# Patient Record
Sex: FEMALE | Race: WHITE | NOT HISPANIC OR LATINO | Employment: PART TIME | URBAN - NONMETROPOLITAN AREA
[De-identification: names, ages, dates, MRNs, and addresses within clinical notes are randomized per-mention and may not be internally consistent; named-entity substitution may affect disease eponyms.]

---

## 2017-06-13 ENCOUNTER — HOSPITAL ENCOUNTER (OUTPATIENT)
Dept: MEDSURG UNIT | Facility: OTHER | Age: 27
Discharge: HOME | End: 2017-06-14
Attending: EMERGENCY MEDICINE | Admitting: SURGERY

## 2017-06-13 ENCOUNTER — HISTORY (OUTPATIENT)
Dept: EMERGENCY MEDICINE | Facility: OTHER | Age: 27
End: 2017-06-13

## 2017-06-13 DIAGNOSIS — K35.80 ACUTE APPENDICITIS: ICD-10-CM

## 2017-06-13 DIAGNOSIS — K35.30 ACUTE APPENDICITIS WITH LOCALIZED PERITONITIS: ICD-10-CM

## 2017-06-13 LAB
A/G RATIO - HISTORICAL: 1.6 (ref 1–2)
ABSOLUTE BASOPHILS - HISTORICAL: 0.1 THOU/CU MM
ABSOLUTE EOSINOPHILS - HISTORICAL: 0 THOU/CU MM
ABSOLUTE LYMPHOCYTES - HISTORICAL: 2.9 THOU/CU MM (ref 0.9–2.9)
ABSOLUTE MONOCYTES - HISTORICAL: 1.2 THOU/CU MM
ABSOLUTE NEUTROPHILS - HISTORICAL: 14 THOU/CU MM (ref 1.7–7)
ALBUMIN SERPL-MCNC: 4.5 G/DL (ref 3.5–5.7)
ALP SERPL-CCNC: 69 IU/L (ref 34–104)
ALT (SGPT) - HISTORICAL: 14 IU/L (ref 7–52)
ANION GAP - HISTORICAL: 8 (ref 5–18)
AST SERPL-CCNC: 15 IU/L (ref 13–39)
BACTERIA URINE: NORMAL BACTERIA/HPF
BASOPHILS # BLD AUTO: 0.3 %
BILIRUB SERPL-MCNC: 0.6 MG/DL (ref 0.3–1)
BILIRUB UR QL: NEGATIVE
BUN SERPL-MCNC: 11 MG/DL (ref 7–25)
BUN/CREAT RATIO - HISTORICAL: 13
C-REACTIVE PROTEIN - HISTORICAL: <1 MG/DL
CALCIUM SERPL-MCNC: 9.7 MG/DL (ref 8.6–10.3)
CHLORIDE SERPLBLD-SCNC: 107 MMOL/L (ref 98–107)
CLARITY, URINE: CLEAR CLARITY
CO2 SERPL-SCNC: 22 MMOL/L (ref 21–31)
COLOR UR: YELLOW COLOR
CREAT SERPL-MCNC: 0.88 MG/DL (ref 0.7–1.3)
EOSINOPHIL NFR BLD AUTO: 0.2 %
EPITHELIAL CELLS: NORMAL EPI/HPF
ERYTHROCYTE [DISTWIDTH] IN BLOOD BY AUTOMATED COUNT: 10.8 % (ref 11.5–15.5)
GFR IF NOT AFRICAN AMERICAN - HISTORICAL: >60 ML/MIN/1.73M2
GLOBULIN - HISTORICAL: 2.9 G/DL (ref 2–3.7)
GLUCOSE SERPL-MCNC: 118 MG/DL (ref 70–105)
GLUCOSE URINE: NEGATIVE MG/DL
HCG UR QL: NEGATIVE
HCT VFR BLD AUTO: 44 % (ref 33–51)
HEMOGLOBIN: 15.9 G/DL (ref 12–16)
KETONES UR QL: NEGATIVE MG/DL
LACTATE SERPL-MCNC: 3.3 MMOL/L (ref 0.5–2.2)
LEUKOCYTE ESTERASE URINE: NEGATIVE
LIPASE SERPL-CCNC: 13 IU/L (ref 11–82)
LYMPHOCYTES NFR BLD AUTO: 16 % (ref 20–44)
MCH RBC QN AUTO: 33.1 PG (ref 26–34)
MCHC RBC AUTO-ENTMCNC: 36.1 G/DL (ref 32–36)
MCV RBC AUTO: 92 FL (ref 80–100)
MONOCYTES NFR BLD AUTO: 6.7 %
NEUTROPHILS NFR BLD AUTO: 76.4 % (ref 42–72)
NITRITE UR QL STRIP: NEGATIVE
OCCULT BLOOD,URINE - HISTORICAL: ABNORMAL
OTHER: NORMAL
PH UR: 5.5 [PH]
PLATELET # BLD AUTO: 227 THOU/CU MM (ref 140–440)
PMV BLD: 9.6 FL (ref 6.5–11)
POTASSIUM SERPL-SCNC: 3.9 MMOL/L (ref 3.5–5.1)
PROT SERPL-MCNC: 7.4 G/DL (ref 6.4–8.9)
PROTEIN QUALITATIVE,URINE - HISTORICAL: NEGATIVE MG/DL
RBC - HISTORICAL: NORMAL /HPF
RED BLOOD COUNT - HISTORICAL: 4.81 MIL/CU MM (ref 4–5.2)
SODIUM SERPL-SCNC: 137 MMOL/L (ref 133–143)
SP GR UR STRIP: 1.02
UROBILINOGEN,QUALITATIVE - HISTORICAL: NORMAL EU/DL
WBC - HISTORICAL: NORMAL /HPF
WHITE BLOOD COUNT - HISTORICAL: 18.4 THOU/CU MM (ref 4.5–11)

## 2017-06-14 ENCOUNTER — HISTORY (OUTPATIENT)
Dept: MEDSURG UNIT | Facility: OTHER | Age: 27
End: 2017-06-14

## 2017-06-14 ENCOUNTER — AMBULATORY - GICH (OUTPATIENT)
Dept: SCHEDULING | Facility: OTHER | Age: 27
End: 2017-06-14

## 2017-06-14 ENCOUNTER — SURGERY (OUTPATIENT)
Dept: SURGERY | Facility: OTHER | Age: 27
End: 2017-06-14

## 2017-06-14 LAB
LACTATE SERPL-MCNC: 1.4 MMOL/L (ref 0.5–2.2)
MAGNESIUM SERPL-MCNC: 1.9 MG/DL (ref 1.9–2.7)
MAGNESIUM SERPL-MCNC: 1.9 MG/DL (ref 1.9–2.7)
POTASSIUM SERPL-SCNC: 3.8 MMOL/L (ref 3.5–5.1)
WEAK D - HISTORICAL: NEGATIVE

## 2017-06-15 LAB
ABORH - HISTORICAL: NORMAL
ANTIBODY SCREEN - HISTORICAL: NEGATIVE
SPECIMEN EXPIRATION DATE/TIME - HISTORICAL: NORMAL

## 2017-06-19 LAB
CULTURE - HISTORICAL: NORMAL
CULTURE - HISTORICAL: NORMAL

## 2017-06-21 ENCOUNTER — HISTORY (OUTPATIENT)
Dept: SURGERY | Facility: OTHER | Age: 27
End: 2017-06-21

## 2017-06-21 ENCOUNTER — OFFICE VISIT - GICH (OUTPATIENT)
Dept: SURGERY | Facility: OTHER | Age: 27
End: 2017-06-21

## 2017-06-21 DIAGNOSIS — Z98.890 OTHER SPECIFIED POSTPROCEDURAL STATES: ICD-10-CM

## 2017-12-27 NOTE — PROGRESS NOTES
Patient Information     Patient Name MRN Sex Ysabel Bailey [id number] Female 1990      Progress Notes by Ema Escoto at 2017 10:00 AM     Author:  Ema Escoto Service:  (none) Author Type:  NURS- Registered Nurse     Filed:  2017 10:28 AM Date of Service:  2017 10:00 AM Status:  Signed     :  Ema Escoto (NURS- Registered Nurse)            Patient transferred back from PACU to med/surg room 312 via bed. Patient is stable. Does have a little pain but states it is tolerable. Mom is at bedside. Will continue to monitor.

## 2017-12-27 NOTE — PROGRESS NOTES
"Patient Information     Patient Name MRYsabel Jimenez [id number] Female 1990      Progress Notes by Igor Billingsley PharmD at 2017  2:38 PM     Author:  Igor Billingsley PharmD Service:  (none) Author Type:  PHARM- Pharmacist     Filed:  2017  2:39 PM Date of Service:  2017  2:38 PM Status:  Signed     :  Igor Billingsley PharmD (PHARM- Pharmacist)            Pharmacy: Discharge Counseling and Medication Reconciliation    Ysabel David  [address] [facility] [address]    Home Phone [phone]   Work Phone Not on file.   Mobile [phone]     26 y.o. female  PCP:Zay Carlos MD    No Known Allergies    Discharge Counseling:    Pharmacist met with patient (and/or family) today to review the medication portion of the After Visit Summary (with an emphasis on NEW medications) and to address patient's questions/concerns.     Summary of Education: PharmD met with patient to discuss new medications after discharge. Reviewed indication, expected duration, potential side effects and proper use of each.     Materials Provided:   MedCounselor sheets printed from Clinical Pharmacology on: \"Oxycodone/ APAP\" and \"Docusate\"    Discharge Medication Reconciliation:    Igor Billingsley PharmD has reviewed the patient's discharge medication orders and has compared them to the inpatient medication administration record and to what the patient was taking prior to admission- any discrepancies have been resolved.     It has been determined that the patient has an adequate supply of medications available or which can be obtained from the patient's preferred pharmacy, which HE/SHE has confirmed as: [facility].    Thank you for the consult.     Igor Billingsley PharmD ....................  2017   2:38 PM          "

## 2017-12-27 NOTE — PROGRESS NOTES
Patient Information     Patient Name MRN Ysabel Rose [id number] Female 1990      Progress Notes by Roya Souza RN at 2017  7:24 AM     Author:  Roya Souza RN Service:  (none) Author Type:  NURS- Registered Nurse     Filed:  2017  7:25 AM Date of Service:  2017  7:24 AM Status:  Signed     :  Roya Souza RN (NURS- Registered Nurse)            Shower and bryant wipes have been done. Zofran given for nausea, Dilaudid given for pain. Roya Souza RN   ........2017  7:25 AM

## 2017-12-27 NOTE — PROGRESS NOTES
Patient Information     Patient Name MRN Sex Ysabel Bailey [id number] Female 1990      Progress Notes by Ladonna Cormier, RN at 2017 12:34 AM     Author:  Ladonna Cormier RN Service:  (none) Author Type:  NURS- Registered Nurse     Filed:  2017 12:34 AM Date of Service:  2017 12:34 AM Status:  Signed     :  Ladonna Cormier RN (NURS- Registered Nurse)            Patient assigned to  312 when nurse Dickerson is available to take over care.

## 2017-12-27 NOTE — PROGRESS NOTES
Patient Information     Patient Name MRN Sex Ysabel Bailey [id number] Female 1990      Progress Notes by Erendira Martin RN at 2017  8:45 PM     Author:  Erendira Matrin RN Service:  (none) Author Type:  NURS- Registered Nurse     Filed:  2017  9:21 PM Date of Service:  2017  8:45 PM Status:  Signed     :  Erendira Martin RN (NURS- Registered Nurse)            Discharge Note    Data:  Ysabel David has been discharged home at  via wheelchair accompanied by Nursing Assistant and Family.      Action:  IV access removed, patient reporting decreased pain with medication. Written discharge/follow-up instructions were provided to patient. Prescriptions were taken by mom and filled prior to discharge.  Belongings sent with patient and Mother. Medications from home sent with patient/family: No  Equipment none .     Response:  Patient and Mother verbalized understanding of discharge instructions, reason for discharge, and necessary follow-up appointments.   ERENDIRA MARTIN RN ....................  2017   8:45 PM

## 2017-12-27 NOTE — PROGRESS NOTES
Patient Information     Patient Name MRN Sex Ysabel Bailey [id number] Female 1990      Progress Notes by Gloria Morejon at 2017 10:31 PM     Author:  Gloria Morejon Service:  (none) Author Type:  [employer] - Registered Radiologic Technologist     Filed:  2017 10:31 PM Date of Service:  2017 10:31 PM Status:  Signed     :  Gloria Morejon (Counts include 234 beds at the [facility] - Registered Radiologic Technologist)            1.  Has the patient had a previous reaction to IV contrast? No    2.  Does the patient have kidney disease? No    3.  Is the patient on dialysis? No    If YES to any of these questions, exam will be reviewed with a Radiologist before administering contrast.

## 2017-12-27 NOTE — PROGRESS NOTES
Patient Information     Patient Name MRN Sex Ysabel Bailey [id number] Female 1990      Progress Notes by Roya Souza RN at 2017  2:13 AM     Author:  Roya Souza RN Service:  (none) Author Type:  NURS- Registered Nurse     Filed:  2017  2:14 AM Date of Service:  2017  2:13 AM Status:  Signed     :  Roya Souza RN (NURS- Registered Nurse)            Admission Note    Data:  Ysabel David admitted to medical from emergency department via cart.  Report given from Orquidea FERNANDEZ.    Action:  Family and Dr. cMkenzie have been notified of admission.      Response:  Patient tolerated transfer. and Patient is stable. Roya Souza RN   ........2017  2:14 AM

## 2017-12-27 NOTE — PROGRESS NOTES
Patient Information     Patient Name MRN Ysabel Rose [id number] Female 1990      Progress Notes by Ema Escoto at 2017  5:13 PM     Author:  Ema Escoto Service:  (none) Author Type:  NURS- Registered Nurse     Filed:  2017  5:13 PM Date of Service:  2017  5:13 PM Status:  Signed     :  Ema Escoto (NURS- Registered Nurse)            Problem: PAIN  Goal: VERBALIZES/DISPLAYS ADEQUATE COMFORT LEVEL OR BASELINE COMFORT LEVEL  Patient preparing to discharge. Got dressed and went back to bed while waiting for discharge. Started having episode of sudden, severe pain - located in the same places as before (back, upper back and abdomen) but was shaking and hardly able to breathe or speak. Morphine and toradol administered. VSS stable leading into episode. Dr. Rodrigues notified of episode and ordered 1x dose of IV tylenol. Patient began to settle down and rating pain at 3/10. Discussed the episode of pain with patient and decided to hold this IV tylenol and give 2 percocet for better pain control. At this time, patient is currently resting in room. Plan to eat supper and go for a walk. Pain is possibly r/t gas as patient has not passed gas at this time. Will continue to monitor, still potential for discharge this evening.

## 2017-12-28 NOTE — PROGRESS NOTES
Patient Information     Patient Name MRN Sex Ysabel Bailey [id number] Female 1990      Progress Notes by Igor Billingsley PharmD at 2017 10:31 AM     Author:  Igor Billingsley PharmD Service:  (none) Author Type:  PHARM- Pharmacist     Filed:  2017 10:31 AM Date of Service:  2017 10:31 AM Status:  Signed     :  Igor Billingsley PharmD (PHARM- Pharmacist)            Pharmacy - Transfer Medication Reconciliation     The patient's transfer medication orders have been compared to the medication administration record and to the Prior to Admissions Medications list - any noted discrepancies were resolved with the MD.     Thank you. Pharmacy will continue to monitor.     Igor Billingsley PharmD ....................  2017   10:31 AM

## 2017-12-28 NOTE — PROGRESS NOTES
Patient Information     Patient Name MRN Ysabel Rose [id number] Female 1990      Progress Notes by Addy Rodrigues MD at 2017  2:40 PM     Author:  Addy Rodrigues MD Service:  (none) Author Type:  Physician     Filed:  2017  2:56 PM Encounter Date:  2017 Status:  Signed     :  Addy Rodrigues MD (Physician)            Subjective:  This is a follow up after laparoscopic appendectomy on 17 for acute appendicitis. The patient has no complaints.  She is eating well, moving her bowels regularly and has no discomfort.    Objective: /78  Pulse 90  Wt 71.5 kg (157 lb 9.6 oz)  LMP 2017  Breastfeeding? No  BMI 24.68 kg/m2  The incisions are healing well without erythema. Soft and nontender.    Assessment:   Doing well after laparoscopic appendectomy    Plan:  Follow-up as needed

## 2017-12-28 NOTE — PROGRESS NOTES
"Patient Information     Patient Name MRN Sex Ysabel Bailey [id number] Female 1990      Progress Notes by Addy Rodrigues MD at 2017  5:20 PM     Author:  Addy Rodrigues MD Service:  (none) Author Type:  Physician     Filed:  2017  5:22 PM Date of Service:  2017  5:20 PM Status:  Signed     :  Addy Rodrigues MD (Physician)            Surgical Progress Note     POD# 0 s/p laparoscopic appendectomy.    Subjective: Pain much better controlled. Had right shoulder pain earlier.    Objective: Eating and comfortable in bed    Intake/Output Summary at --  3 Day 7AM to 7AM   Last data filed at 17 1500   Gross for 17 Gross for 17 Gross for 17   Intake      0 ml   2473 ml   1574 ml   Output      0 ml    875 ml   2900 ml   Net      0 ml   1598 ml  -1326 ml         /65  Pulse 92  Temp 98.7  F (37.1  C)  Resp 16  Ht 1.702 m (5' 7\")  Wt 74.9 kg (165 lb 2 oz)  LMP 2017  SpO2 98%  Breastfeeding? No  BMI 25.86 kg/m2Temp (24hrs), Av.6  F (37  C), Min:97.7  F (36.5  C), Max:100.2  F (37.9  C)         ABDOMEN: soft and non-tender    LABS  Recent Labs       17   2137   WBC  18.4 H   RBC  4.81   HGB  15.9   HCT  44.0   MCV  92   MCH  33.1   MCHC  36.1 H   PLT  227   MPV  9.6       Recent Labs        17   0416  17   2137   SODIUM   --   137   POTASSIUM  3.8  3.9   CHLORIDE   --   107   [id number]   --   22   BUN   --   11   CREATININE   --   0.88   GLUCOSE   --   118 H   CALCIUM   --   9.7     Recent Labs       17   2137   ALKPHOSPH  69   PROTEIN  7.4   BILITOTAL  0.6   AST  15   ALT  14           ASSESSMENT  Stable after laparoscopic appendectomy. Pain likely from gas at surgery.    PLAN  Discharge today      Addy Rodrigues MD            "

## 2017-12-28 NOTE — PROGRESS NOTES
Patient Information     Patient Name MRN Sex Ysabel Bailey [id number] Female 1990      Progress Notes by Igor Billingsley PharmD at 2017 12:05 PM     Author:  Igor Billingsley PharmD Service:  (none) Author Type:  PHARM- Pharmacist     Filed:  2017 12:08 PM Date of Service:  2017 12:05 PM Status:  Signed     :  Igor Billingsley PharmD (PHARM- Pharmacist)            Pharmacy -- Admission Medication Reconciliation    Prior to admission (PTA) medications were reviewed and the patient's PTA medication list was updated.     Sources Consulted: Patient Interview, Chart Review, [facility], [employer]    The reliability of this Medication Reconciliation is: MODERATE.    The following significant changes were made:  Removed Seroqeul XR as patient is not on that formulation anymore.   Updated 30 mg Adderall to be the XR formulation. She usually takes either the XR or the IR depending on what she has to get done in the day- she doesn't take both.  Patient [facility] she is unsure of the name. It is a once daily. Left current on list to reflect OC use, note formulation may not be correct.        In addition, the patient's allergies were reviewed with the patient and updated as follows -  No Known Allergies    The pharmacist has reviewed with the patient that all personal medications should be removed from the building or locked in the belongings safe. Patient shall only take medications ordered by the physician and administered by the nursing staff.     Pharmacy Discharge Planning      Medication barriers identified:  Patient states that she has insurance and that her copays are very low.     Medication adherence concerns:  NA    Understanding of emergency medications:  TRUMAN Billingsley PharmD ....................  2017   12:05 PM

## 2017-12-28 NOTE — OR POSTOP
Patient Information     Patient Name MRN Sex Ysabel Bailey [id number] Female 1990      OR PostOp by Lara Dixon RN at 2017  9:50 AM     Author:  Lara Dixon RN Service:  (none) Author Type:  NURS- Registered Nurse     Filed:  2017  9:50 AM Date of Service:  2017  9:50 AM Status:  Signed     :  Lara Dixon RN (NURS- Registered Nurse)            PACU Transfer Note    Ysabel David transferred to [facility] via bed.  Equipment used for transport:  None.  Accompanied by:  Registered Nurse. Report given to Ema FERNANDEZ.    Patient stable and meets phase 1 discharge criteria for transport from PACU.    PACU Respiratory Event Documentation     1) Episodes of Apnea greater than or equal to 10 seconds: No    2) Bradypnea - less than 8 breaths per minute: No    3) Pain score on 0 to 10 scale: 3    4) Pain-sedation mismatch (yes or no): No    5) Repeated 02 desaturation less than 90% (yes or no): No    Anesthesia notified? (yes or no): No    Any of the above events occuring repeatedly in separate 30 minute intervals may be considered recurrent PACU respiratory events.

## 2017-12-28 NOTE — OR ANESTHESIA
Patient Information     Patient Name MRN Sex Ysabel Bailey [id number] Female 1990      OR Anesthesia by Ariel Zhao DO at 2017  7:32 AM     Author:  Ariel Zhao DO Service:  (none) Author Type:  PHYS- Anesthesiologist     Filed:  2017  7:32 AM Date of Service:  2017  7:32 AM Status:  Signed     :  Ariel Zhao DO (PHYS- Anesthesiologist)                                                           ANESTHESIA ASSESSMENT    Date: 17 Time: 7:32 AM      Patient:  Ysabel David    Procedure(s) (LRB):  LAPAROSCOPIC APPENDECTOMY (N/A)    Past Medical History:     Diagnosis  Date     Borderline personality disorder     ?      Depression, major     suicidal ideation, hospitalized x 3 in       Menarche Age 14       No past surgical history on file.    Family History       Problem   Relation Age of Onset     Psychiatric illness  Brother      Severe depression       Cancer-breast  Maternal Grandmother      Cancer-colon  No Family History      Cancer-prostate  No Family History      Cancer-ovarian  No Family History      Blood Disease  No Family History      Asthma  No Family History      Heart Disease  No Family History      Diabetes  No Family History      Hypertension  No Family History      Seizures  No Family History      Stroke  No Family History      Thyroid Disease  No Family History        Patient Active Problem List     Diagnosis  Code     INSOMNIA G47.00     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  0     dextroamphetamine-amphetamine (ADDERALL) 20 mg tablet Take 1 tablet by mouth once daily.  0     norethin-e.estradiol triphasic (NORTREL , ,) 0.5/0.75/1 mg- 35 mcg tablet Take 1 tablet by mouth once daily. 3 Package 3      "QUEtiapine (SEROQUEL) 200 mg tablet Take 1 tablet by mouth at bedtime.  0     SEROQUEL  mg Extended-Release tablet TAKE ONE TABLET BY MOUTH AT BEDTIME 20 tablet 0       Allergies:No Known Allergies    Review of Systems:  GERD: No  Chest pain: No  Shortness of breath: No  Recent fever: No  Poor exercise tolerance: No  Bleeding tendency: No  Pregnant: No  Anesthesia Complications: None      History    Smoking Status      Never Smoker   Smokeless Tobacco      Never Used     Social History     Social History        Marital status:  Single     Spouse name: N/A     Number of children:  N/A     Years of education:  N/A     Social History Main Topics          Smoking status:   Never Smoker      Smokeless tobacco:   Never Used      Alcohol use   Yes      Comment: socially       Drug use:   Yes      Special:  Marijuana      Sexual activity:   Yes      Partners:  Male      Birth control/ protection:  Pill      Other Topics  Concern     None      Social History Narrative     She was home schooled.  Involved in drama at the high school.       Physical Examination:  /85  Pulse (!) 103  Temp 98.3  F (36.8  C)  Resp 12  Ht 1.702 m (5' 7\")  Wt 74.9 kg (165 lb 2 oz)  LMP 05/30/2017 Comment: neg hcg 6/13/17  SpO2 96%  Breastfeeding? No  BMI 25.86 kg/m2 Body mass index is 25.86 kg/(m^2). Body surface area is 1.88 meters squared.  Dental Condition: Good     Mallampati Score (Airway): II  Cardiovascular: Normal  Pulmonary: Normal  Other: N/A    Recent Labs in [facility]:    Recent Labs         06/14/17   0416  06/13/17   2214  06/13/17   2137   SODIUM   --    --   137   POTASSIUM  3.8   --   3.9   CHLORIDE   --    --   107   AV4QICUH   --    --   22   ANIONGAP   --    --   8   BUN   --    --   11   CREATININE   --    --   0.88   BUNCREARATIO   --    --   13   CALCIUM   --    --   9.7   GLUCOSE   --    --   118 H   MAGNESIUM  1.9   --   1.9   WBC   --    --   18.4 H   HGB   --    --   15.9   HCT   --    --   44.0   PLT "   --    --   227   ABORH  A, See Weak D   --    --    PREGURINE   --   Negative   --              Assessment/Plan:  ASA Class: II  Risk of dental injury discussed: Yes  NPO status confirmed: Yes  Anesthetic Plan:  General  Risk/Benefit/Alt discussed: Yes  Questions answered: Yes  Emergency Case?: Yes  Labs/ECG/Radiology Reviewed?: Yes      H&P Reviewed.  Patient Examined.      Provider Electronic Signature:  Ariel Zhao, DO

## 2017-12-28 NOTE — CARE COORDINATION
"Patient Information     Patient Name MRN Ysabel Rose [id number] Female 1990      Case Mgmt by Emelia George LSW at 2017  1:10 PM     Author:  Emelia George LSW  Service:  (none) Author Type:  SWS- Licensed Social Worker     Filed:  2017  2:23 PM  Date of Service:  2017  1:10 PM Status:  Addendum     :  Emelia George LSW ([facility]- Licensed Social Worker)        Related Notes: Original Note by Emelia George LSW ([facility]- Licensed Social Worker) filed at 2017  1:18 PM            :  Received a  referral that patient has concerns regarding \"paying bills.\"  Met with patient in room. She has [employer] for health insurance.  She was wondering if the cost of her surgery would be covered.  Contacted  [facility] and left a message regarding this.    WILLIAN Leonardo ....................  2017   1:10 PM     Addendum:  [facility]  called and updated that patient's insurance does not require a referral and it appears insurance should cover.   WILLIAN Leonardo ....................  2017   2:22 PM          "

## 2017-12-28 NOTE — PROGRESS NOTES
Patient Information     Patient Name MRN Sex Ysabel Bailey [id number] Female 1990      Progress Notes by Gloria Morejon at 2017 10:31 PM     Author:  Gloria Morejon Service:  (none) Author Type:  [employer] - Registered Radiologic Technologist     Filed:  2017 10:32 PM Date of Service:  2017 10:31 PM Status:  Signed     :  Gloria Morejon ([facility] - Registered Radiologic Technologist)            IV Contrast- Discharge Instructions After Your CT Scan      The IV contrast you received today will be filtered from your bloodstream by your kidneys during the next 24 hours and pass from the body in urine.  You will not be aware of this process and your urine will not change in color.  To help this process you should drink at least 4 additional glasses of water or juice today.  This reduces stress on your kidneys.    Most contrast reactions are immediate.  Should you develop symptoms of concern after discharge, contact the department at the number below.  After hours you should contact your personal physician.  If you develop breathing distress or wheezing, call 911.

## 2017-12-28 NOTE — OR SURGEON
Patient Information     Patient Name MRN Sex Ysabel Bailey [id number] Female 1990      OR Surgeon by Addy Rodrigues MD at 2017  8:41 AM     Author:  Addy Rodrigues MD Service:  (none) Author Type:  Physician     Filed:  2017  8:46 AM Date of Service:  2017  8:41 AM Status:  Signed     :  Addy Rodrigues MD (Physician)            PREOPERATIVE DIAGNOSIS:  Acute Appendicitis.      POSTOPERATIVE DIAGNOSIS:  Acute Appendicitis.      PROCEDURE PERFORMED:   Laparoscopic appendectomy.      ANESTHESIA:  General, FATOUMATA Zhao    INDICATION FOR THE PROCEDURE:  The patient is a 26 y.o. female who presented with  RLQ abdominal pain and WBC of 18.  CT shows fecaliths and edematous appendix.     PROCEDURE:  After adequate general anesthesia, the patient was prepped and draped in usual sterile fashion.  A 5 mm infra-umbilical incision was made.  The abdomen was entered using the Visiport technique.  The abdomen was then insufflated with carbon dioxide to a pressure of 15 mmHg.  Under direct vision, a 12 mm suprapubic port was placed three fingerbreadths above the symphysis pubis and a 5 mm right-sided port placed as well. The appendix was edematous and with small amount of exudate and retrocecal and long.  The base of the appendix was dissected free circumferentially and divided with the ENDO GI with the blue bowel size staples.  The mesoappendix was then divided with the ENDO RUBI with the white vascular cartridges.  The appendix was placed in a [place] Pouch and removed through the suprapubic port site without spillage.  The right lower quadrant was irrigated with NS. IOWA solution was placed over liver and cecum covered with omentum.The 5 mm ports were removed. The abdomen deflated and 12 mm port removed. The suprapubic fascial defect was closed with an 0 Vicryl suture, the dermis re-approximated with interrupted 3-0 Vicryl sutures and the skin closed with 5-0 Maxon intracuticular suture.   The wounds were infiltrated with IOWA solution.  Proxi-Strips and clean, dry dressings were applied.  The patient was taken to the recovery room in stable condition.    Addy Rodrigues MD ....................  6/14/2017   8:41 AM      Surgeon:  Addy Rodrigues MD, FACS  Assistant: LEYLA Sosa CST    Family Phys. Zay Carlos MD

## 2017-12-28 NOTE — OR ANESTHESIA
Patient Information     Patient Name MRN Sex Ysabel Lee [id number] Female 1990      OR Anesthesia by Ariel Zhao DO at 2017  1:42 PM     Author:  Ariel Zhao DO Service:  (none) Author Type:  PHYS- Anesthesiologist     Filed:  2017  1:42 PM Date of Service:  2017  1:42 PM Status:  Signed     :  Ariel Zhao DO (PHYS- Anesthesiologist)            Anesthesia Post Operative Care Note    Name: Ysabel David  MRN:   [id number]  :    1990       Procedure Done:  See Surgeon Note        Anesthesia Technique    Anesthetic Type:  General     Airway Management:  ET Tube     Oral Trauma:  No    Intraoperative Course   Hemodynamics:  Stable    Ventilation Normal:  Yes Lung Sounds:  Normal      PACU Course    Airway Status:  Extubated     Nondepolarizer Used:       Reversed: N/A   Hemodynamics:  Stable      Hydration: Euvolemic   Temperature:  36.1 - 38.3      Mental Status:  Awake, alert, follows commands   Pain Management:  Adequate   Regional Block:  No   Anesthesia Complications:  None      Vital Signs:  Temp: 98  F (36.7  C)  Pulse: 97  BP: 133/87  Resp: 16  SpO2: 98 %    O2 Device: Room Air         Level of Nausea: None        Active Lines:  Patient Lines/Drains/Airways Status    Active Line     Name: Placement date: Placement time: Site: Days:    PERIPHERAL VAD Left Hand 17   2130   Hand   less than 1    PERIPHERAL VAD Right Hand 17   2342   Hand   less than 1                Intake & Output:  Date  17 - 17 0659    17 07 - 06/15/17 0659      Shift  5120-2232 2398-9235 1151-5012 24 Hour Total 7633-7715 8847-2828 8921-9907 24 Hour Total   I  N  T  A  K  E   Intravenous   2473 3253 1000   1000       +I/O+    IVPB   100 100           +I/O+  Maint IV (NaCl 0.9%  999 mL)   1989           +I/O+  Maint IV (lactated Ringers infusion)   384 384           +I/O+  Maint IV (lactated Ringers 1,000 mL infusion)     1000   1000     Shift Total   2473 2473 1000   1000   O  U  T  P  U  T   Urine      2400      + I/O +   Voided Urine      2400    Shift Total      2400   NET    1598 1598 -1400   -1400   Weight (kg)   70.3 74.9 74.9 74.9 74.9 74.9 74.9         Labs:  No results for input(s): VU1APOZUVOG, ICK4GUMDJFSG, PHARTERIAL, VOI5FHHZXNHQ, Q8VURLTYDRBF in the last 24 hours.    Recent Labs       06/14/17   0416   MAGNESIUM  1.9       No results for input(s): GLUCOSEMETER in the last 720 hours.        Ariel Zhao DO ....................  6/14/2017   1:42 PM

## 2017-12-29 NOTE — ED NOTES
Patient Information     Patient Name MRN Sex Ysabel Bailey [id number] Female 1990      ED Nursing Note by Orquidea Gerardo RN at 2017 11:32 PM     Author:  Orquidea Gerardo RN  Service:  (none) Author Type:  NURS- Registered Nurse     Filed:  2017 12:12 AM  Date of Service:  2017 11:32 PM Status:  Addendum     :  Orquidea Gerardo RN (NURS- Registered Nurse)        Related Notes: Original Note by Orquidea Gerardo, RN (NURS- Registered Nurse) filed at 2017 11:32 PM

## 2017-12-29 NOTE — ED NOTES
"Patient Information     Patient Name MRN Ysabel Rose [id number] Female 1990      ED Nursing Note by Orquidea Gerardo RN at 2017 10:47 PM     Author:  Orquidea Gerardo RN Service:  (none) Author Type:  NURS- Registered Nurse     Filed:  2017 10:49 PM Date of Service:  2017 10:47 PM Status:  Signed     :  Orquidea Gerardo RN (NURS- Registered Nurse)            Pt back from CT.  Rates pain 2/10 and feeling \"comfortable\".          "

## 2017-12-29 NOTE — ED NOTES
Patient Information     Patient Name MRN Ysabel Rose [id number] Female 1990      ED Nursing Note by Suzy Bueno RN at 2017 11:45 PM     Author:  Suzy Bueno RN Service:  (none) Author Type:  NURS- Registered Nurse     Filed:  2017 11:45 PM Date of Service:  2017 11:45 PM Status:  Signed     :  Suzy Bueno RN (NURS- Registered Nurse)            House supervisor contacted about admit.

## 2017-12-29 NOTE — ED NOTES
Patient Information     Patient Name MRN Sex Ysabel Bailey [id number] Female 1990      ED Nursing Note by Orquidea Gerardo RN at 2017 10:35 PM     Author:  Orquidea Gerardo RN Service:  (none) Author Type:  NURS- Registered Nurse     Filed:  2017 10:35 PM Date of Service:  2017 10:35 PM Status:  Signed     :  Orquidea Gerardo RN (NURS- Registered Nurse)            Patient to CT via cart

## 2017-12-29 NOTE — ED NOTES
Patient Information     Patient Name MRN Sex Ysabel Bailey [id number] Female 1990      ED Nursing Note by Orquidea Gerardo RN at 2017 10:05 PM     Author:  Orquidea Gerardo RN Service:  (none) Author Type:  NURS- Registered Nurse     Filed:  2017 10:43 PM Date of Service:  2017 10:05 PM Status:  Signed     :  Orquidea Gerardo RN (NURS- Registered Nurse)            Patient was unable to void.  Straight cath for urine sample of clear yellow urine sent to lab.

## 2017-12-29 NOTE — H&P
Patient Information     Patient Name MRN Ysabel Rose [id number] Female 1990      H&P by Addy Rodrigues MD at 2017  7:10 AM     Author:  Addy Rodrigues MD Service:  (none) Author Type:  Physician     Filed:  2017  7:15 AM Date of Service:  2017  7:10 AM Status:  Signed     :  Addy Rodrigues MD (Physician)            Surgical  Consult  Referring physician:  Dr Farnsworth  Primary physician:     Zay Carlos MD    Chief complaint:   Acute appendicitis    History of present illness:  This is a 26 y.o. female I am seeing in consultation for acute appendicitis. The patient developed diffuse abdominal pain yesterday afternoon. This was associated with nausea and some back pain. The patient's pain localized to the right lower quadrant. White count was elevated at 18. The patient's CT scan showed fecaliths, appendiceal edema.    Past medical history:   Past Medical History:     Diagnosis  Date     Borderline personality disorder     ?      Depression, major     suicidal ideation, hospitalized x 3 in 2012      Menarche Age 14       Past surgical history:  No past surgical history on file.    Current medications:  Current Facility-Administered Medications         Medication  Dose Route Frequency Provider Last Rate     BUPivacaine-EPINEPHrine 0.5%-1:200,000 30 mL, sodium bicarbonate 1.25 mEq, NaCl 0.9% 70 mL  1-101 mL Irrigation INTRAPROCEDURAL PER PROVIDER Addy Rodrigues MD       fentaNYL 25-50 mcg injection (SUBLIMAZE)  25-50 mcg Intravenous q5min prn Ariel Zhao, DO       fentaNYL  mcg injection (SUBLIMAZE)   mcg Intravenous one time prn Ariel Zhao, DO       haloperidol 0.5 mg injection (HALDOL)  0.5 mg Intravenous Each Time PRN Ariel Zhao, DO       HYDROmorphone 0.25-0.5 mg injection (DILAUDID)  0.25-0.5 mg Intravenous q5min prn Ariel Zhao, DO       HYDROmorphone 0.5 mg injection (DILAUDID)  0.5 mg Intravenous q20min prn Tevin Farnsworth MD        HYDROmorphone 0.5 mg injection (DILAUDID)  0.5 mg Intravenous q20min prn Tevin Farnsworth MD       lactated Ringers infusion  125 mL/hr Intravenous continuous Tevin Farnsworth  mL/hr (06/14/17 0203)     lactated Ringers infusion  25 mL/hr Intravenous continuous Ariel Zhao DO       lidocaine (1%) injection 0.1-1 mL  0.1-1 mL Intra-Dermal one time prn Ariel Zhao,        magnesium REPLACEMENT protocol   Protocol protocol Tevin Farnsworth MD       Meperidine (PF) 50 mg/mL 12.5 mg soln  12.5 mg Intravenous q5min prn Ariel Zhao,        midazolam (PF) 1 mg injection (VERSED)  1 mg Intravenous q5min prn Ariel Zhao,        midazolam (PF) 1-2 mg injection (VERSED)  1-2 mg Intravenous one time prn Ariel Zhao,        morphine 2 mg injection  2 mg Intravenous q5min prn Ariel Zhao,        NaCl 0.9%   25 mL/hr Intravenous Conditional Ariel Zhao,        NaCl 0.9%  500 mL/hr Intravenous one time prn Ariel Zhao,        naloxone 0.08 mg injection (NARCAN)  0.08 mg Intravenous q3min prn Tevin Farnsworth MD       naloxone 0.08 mg injection (NARCAN)  0.08 mg Intravenous q3min prn Ariel Zhao,        ondansetron 4 mg injection (ZOFRAN)  4 mg Intravenous q6h prn Tevin Farnsworth MD       phenylephrine 100 mcg injection (ROBERTO-SYNEPHRINE)  100 mcg Intravenous q5min prn Ariel Zhao,        potassium REPLACEMENT protocol   Protocol protocol Tevin Farnsworth MD       scopolamine 1.5mg 1 Patch (TRANSDERM SCOP)  1 Patch Transdermal one time prn Ariel Zhao DO         Allergies:  No Known Allergies    Family history:  Family History       Problem   Relation Age of Onset     Psychiatric illness  Brother      Severe depression       Cancer-breast  Maternal Grandmother      Cancer-colon  No Family History      Cancer-prostate  No Family History      Cancer-ovarian  No Family History      Blood Disease  No Family History      Asthma  No Family History      Heart Disease  " No Family History      Diabetes  No Family History      Hypertension  No Family History      Seizures  No Family History      Stroke  No Family History      Thyroid Disease  No Family History        Social history:  Social History     Social History        Marital status:  Single     Spouse name: N/A     Number of children:  N/A     Years of education:  N/A     Occupational History      Not on file.     Social History Main Topics          Smoking status:   Never Smoker      Smokeless tobacco:   Never Used      Alcohol use   Yes      Comment: socially       Drug use:   Yes      Special:  Marijuana      Sexual activity:   Yes      Partners:  Male      Birth control/ protection:  Pill      Other Topics  Concern     Not on file      Social History Narrative     She was home schooled.  Involved in drama at the high school.       Review of systems:  COMPLETE REVIEW OF SYSTEMS: Fever decreased appetite chills  Gastrointestinal: See history of present illness  Cardiovascular: Denies problems  Respiratory: Denies problems  Genitourinary denies problems   Musculoskeletal: Back pain  Skin: Denies problems  Neurologic: Denies problems  Psychiatric: Borderline personality and history depression  Endocrine: Denies problems  Heme/Lymphatic: Denies problems  Vascular: Denies problems      Physical exam: /85  Pulse (!) 103  Temp 98.3  F (36.8  C)  Resp 12  Ht 1.702 m (5' 7\")  Wt 74.9 kg (165 lb 2 oz)  LMP 05/30/2017  SpO2 96%  Breastfeeding? No  BMI 25.86 kg/m2      General: this is a pleasant female patient in no acute distress.  Patient is awake alert and oriented x3 .   Respiratory:  Clear without wheezes or crackles  Cardiovascular: Regular rate and rhythm without murmurs  Abdominal: No surgical scars. Tender to percussion and palpation in the right lower quadrant.    Assessment:   Acute appendicitis with localized peritonitis    Plan:    Laparoscopic appendectomy under general anesthesia as a day surgery. " Patient understands the risks to include bleeding, infection, potential injury to nearby structures such as bowel or bladder, possible open procedure and wishes to proceed.      Addy Rodrigues MD FACS

## 2017-12-29 NOTE — ED NOTES
Patient Information     Patient Name MRN Sex Ysabel Bailey [id number] Female 1990      ED Nursing Note by Orquidea Gerardo RN at 2017 12:53 AM     Author:  Orquidea Gerardo RN Service:  (none) Author Type:  NURS- Registered Nurse     Filed:  2017 12:55 AM Date of Service:  2017 12:53 AM Status:  Signed     :  Orquidea Gerardo RN (NURS- Registered Nurse)            Patient has decreased pain after dilaudid, rates 2/10.  Resting comfortably with mother at bedside.

## 2017-12-29 NOTE — ED NOTES
Patient Information     Patient Name MRN Sex Ysabel Bailey [id number] Female 1990      ED Nursing Note by Orquidea Gerardo RN at 2017 12:28 AM     Author:  Orquidea Gerardo RN Service:  (none) Author Type:  NURS- Registered Nurse     Filed:  2017 12:29 AM Date of Service:  2017 12:28 AM Status:  Signed     :  Orquidea Gerardo RN (NURS- Registered Nurse)            Patient states that her lower back pain after dilaudid is still a 4/10.  Patient is alert, VSS and will administer another PRN dose of dilaudid at this time.  Patient continues to deny any nausea.

## 2017-12-29 NOTE — ED PROVIDER NOTES
Patient Information     Patient Name MRN Sex Ysabel Bailey [id number] Female 1990      ED Provider Note by Tevin Farnsworth at 2017  1:52 AM     Author:  Tevin Farnsworth Service:  (none) Author Type:  Physician     Filed:  2017  2:01 AM Date of Service:  2017  1:52 AM Status:  Signed     :  Tevin Farnsworth (Physician)            PATIENT:  Ysabel David       26 y.o.       female       MRN #:  [id number]    CHIEF COMPLAINT:  Back Pain and Vomiting    HPI   Ysabel is a 26yoF who presents with sudden onset mid back pain this evening ~2-3 hours prior to presentation.  She reports hx of chronic back pain, but nothing like today's symptoms.  She had an episode of emesis this evening shortly after onset of symptoms.      Fever noted in ED.  No neck pain, no headache.  No trauma.  No urinary symptoms.  LMP 2 weeks ago.    ALLERGIES:  Review of patient's allergies indicates no known allergies.    CURRENT MEDICATIONS:    No current outpatient prescriptions on file.  PROBLEM LIST:       INSOMNIA      OCCIPITAL NEURALGIA      HEADACHE        PAST MEDICAL HISTORY:    Past Medical History:     Diagnosis  Date     Borderline personality disorder      Depression, major      Menarche Age 14     PAST SURGICAL HISTORY:  No past surgical history on file.  FAMILY HISTORY:    Family History       Problem   Relation Age of Onset     Psychiatric illness  Brother      Severe depression       Cancer-breast  Maternal Grandmother      Cancer-colon  No Family History      Cancer-prostate  No Family History      Cancer-ovarian  No Family History      Blood Disease  No Family History      Asthma  No Family History      Heart Disease  No Family History      Diabetes  No Family History      Hypertension  No Family History      Seizures  No Family History      Stroke  No Family History      Thyroid Disease  No Family History      SOCIAL HISTORY:  Marital Status:  Single [1]  Employment Status:  Self Employed  "[4]   Occupation:  STUDENT  Substance Use:  Smoking status: Never Smoker                                                              Smokeless status: Never Used                      Alcohol use: Yes              Comment: socially       Review of Systems   Constitutional: Positive for activity change, appetite change, chills and fever.   HENT: Negative.    Respiratory: Negative.    Cardiovascular: Negative.    Gastrointestinal: Positive for abdominal pain, nausea and vomiting.   Musculoskeletal: Positive for back pain.   Skin: Negative.    All other systems reviewed and are negative.       Patient Vitals for the past 24 hrs:   BP Temp Pulse Resp SpO2 Height Weight   06/14/17 0126 125/75 97.8  F (36.6  C) 95 16 97 % 1.702 m (5' 7\") 74.9 kg (165 lb 2 oz)   06/14/17 0033 135/91 - 98 - 95 % - -   06/14/17 0006 - - (!) 103 - 97 % - -   06/14/17 0000 146/92 98.7  F (37.1  C) 99 - 98 % - -   06/13/17 2303 133/90 - (!) 104 - 99 % - -   06/13/17 2300 133/90 - (!) 104 - 98 % - -   06/13/17 2249 125/85 99.4  F (37.4  C) (!) 107 20 97 % - -   06/13/17 2230 137/85 - (!) 105 - 95 % - -   06/13/17 2221 133/88 99.8  F (37.7  C) (!) 106 18 97 % - -   06/13/17 2204 - - (!) 122 - 97 % - -   06/13/17 2201 - 100.2  F (37.9  C) (!) 126 - 97 % - -   06/13/17 2200 133/88 - (!) 125 - 96 % - -   06/13/17 2116 144/97 99.5  F (37.5  C) (!) 132 22 97 % - -   06/13/17 2101 149/76 98.4  F (36.9  C) (!) 136 (!) 24 98 % 1.702 m (5' 7\") 70.3 kg (155 lb)      Physical Exam   Constitutional: She is oriented to person, place, and time. She appears well-developed. She appears distressed.   HENT:   Head: Normocephalic and atraumatic.   Mouth/Throat: Oropharynx is clear and moist.   Eyes: Pupils are equal, round, and reactive to light.   Neck: Normal range of motion.   Cardiovascular: Normal rate, regular rhythm and normal heart sounds.    Pulmonary/Chest: Effort normal and breath sounds normal.   Abdominal: Soft. There is tenderness.   Musculoskeletal: " Normal range of motion.   Neurological: She is alert and oriented to person, place, and time.   Skin: Skin is warm.   Nursing note and vitals reviewed.       ECG:  Sinus tachycardia, rate 129bpm, no acute st elevation/depression  IMAGING:  Addendum created by Hans Uriostegui MD on 6/13/2017 11:26:25 PM CDT      ADDENDUM: THIS REPORT CONTAINS FINDINGS THAT MAY BE CRITICAL TO PATIENT CARE.   The findings were verbally communicated via telephone conference with DEAN GALINDO at 11:25 PM CDT on 6/13/2017. The findings were acknowledged and   understood.  Initial report created on 6/13/2017 11:23:10 PM CDT      EXAM:    CT Abdomen and Pelvis Without and With Intravenous Contrast     CLINICAL HISTORY:    26 years old, female; Pain; Abdominal pain; Flank; Other: Both sides; Patient   HX: Bilat flank pain, fever; Additional info: Back pain     TECHNIQUE:    CT scan of the abdomen and pelvis was performed prior to and following the   uncomplicated administration of intravenous contrast material. Enteric contrast   was not administered. Axial, sagittal and coronal images are submitted. This CT   exam was performed using one or more of the following dose reduction   techniques:  automated exposure control, adjustment of the mA and/or kV   according to patient size, and/or use of iterative reconstruction technique.     CONTRAST:    100 mL of omnipaque 350 administered intravenously.     COMPARISON:    No relevant prior studies available.     FINDINGS:    Lower chest: Minimal dependent atelectatic change.    Liver: Unremarkable.    Gallbladder: No radiodense gallstones.    Biliary tree: No intra- or extrahepatic biliary ductal dilatation.      Pancreas: Unremarkable.    Spleen: Unremarkable.    Adrenal glands: Unremarkable.    Kidneys: Small left renal cyst. No radiodense calculi. No hydronephrosis.    Retroperitoneum: Unremarkable.    GI tract: Limited examination without enteric contrast. Dilated, thickwalled,   hyperemic  appendix with mild periappendiceal inflammatory change and small   intraluminal appendicoliths. No drainable fluid collection. No obstruction or   free air.     Vasculature: Unremarkable.       Pelvis: Bilateral ovarian follicles. No free pelvic fluid.    Musculoskeletal: No acute osseous abnormality.     IMPRESSION:       Acute appendicitis, as described above. No abscess, obstruction or free air.        Additional findings, as above.      THIS DOCUMENT HAS BEEN ELECTRONICALLY SIGNED BY JEFF URRUTIA MD  LABORATORY:  Results for orders placed or performed during the hospital encounter of 06/13/17       Comprehensive Metabolic Panel       Result  Value Ref Range Status    SODIUM 137 133 - 143 mmol/L Final    POTASSIUM 3.9 3.5 - 5.1 mmol/L Final    CHLORIDE 107 98 - 107 mmol/L Final    CO2,TOTAL 22 21 - 31 mmol/L Final    ANION GAP 8 5 - 18                 Final    GLUCOSE 118 (H) 70 - 105 mg/dL Final    CALCIUM 9.7 8.6 - 10.3 mg/dL Final    BUN 11 7 - 25 mg/dL Final    CREATININE 0.88 0.70 - 1.30 mg/dL Final    BUN/CREAT RATIO           13                 Final    GFR if African American >60 >60 ml/min/1.73m2 Final    GFR if not African American >60 >60 ml/min/1.73m2 Final    ALBUMIN 4.5 3.5 - 5.7 g/dL Final    PROTEIN,TOTAL 7.4 6.4 - 8.9 g/dL Final    GLOBULIN                  2.9 2.0 - 3.7 g/dL Final    A/G RATIO 1.6 1.0 - 2.0                 Final    BILIRUBIN,TOTAL 0.6 0.3 - 1.0 mg/dL Final    ALK PHOSPHATASE 69 34 - 104 IU/L Final    ALT (SGPT) 14 7 - 52 IU/L Final    AST (SGOT) 15 13 - 39 IU/L Final   Urinalysis with Reflex Microscopic if Positive       Result  Value Ref Range Status    COLOR                     Yellow Yellow Color Final    CLARITY                   Clear Clear Clarity Final    SPECIFIC GRAVITY,URINE    1.025 1.010, 1.015, 1.020, 1.025                 Final    PH,URINE                  5.5 6.0, 7.0, 8.0, 5.5, 6.5, 7.5, 8.5                 Final    UROBILINOGEN,QUALITATIVE  Normal Normal EU/dl  Final    PROTEIN, URINE Negative Negative mg/dL Final    GLUCOSE, URINE Negative Negative mg/dL Final    KETONES,URINE             Negative Negative mg/dL Final    BILIRUBIN,URINE           Negative Negative                 Final    OCCULT BLOOD,URINE        Small (A) Negative                 Final    NITRITE                   Negative Negative                 Final    LEUKOCYTE ESTERASE        Negative Negative                 Final   Urine Pregnancy       Result  Value Ref Range Status    PREGNANCY,URINE           Negative Negative Final   LACTATE,BLOOD       Result  Value Ref Range Status    LACTATE,BLOOD 3.3 (HH) 0.5 - 2.2 mmol/L Final   BLOOD CULTURE       Result  Value Ref Range Status    CULTURE No growth to date.  Preliminary   BLOOD CULTURE       Result  Value Ref Range Status    CULTURE No growth to date.  Preliminary   CBC WITH AUTO DIFFERENTIAL       Result  Value Ref Range Status    WHITE BLOOD COUNT         18.4 (H) 4.5 - 11.0 thou/cu mm Final    RED BLOOD COUNT           4.81 4.00 - 5.20 mil/cu mm Final    HEMOGLOBIN                15.9 12.0 - 16.0 g/dL Final    HEMATOCRIT                44.0 33.0 - 51.0 % Final    MCV                       92 80 - 100 fL Final    MCH                       33.1 26.0 - 34.0 pg Final    MCHC                      36.1 (H) 32.0 - 36.0 g/dL Final    RDW                       10.8 (L) 11.5 - 15.5 % Final    PLATELET COUNT            227 140 - 440 thou/cu mm Final    MPV                       9.6 6.5 - 11.0 fL Final    NEUTROPHILS               76.4 (H) 42.0 - 72.0 % Final    LYMPHOCYTES               16.0 (L) 20.0 - 44.0 % Final    MONOCYTES                 6.7 <12.0 % Final    EOSINOPHILS               0.2 <8.0 % Final    BASOPHILS                 0.3 <3.0 % Final    ABSOLUTE NEUTROPHILS      14.0 (H) 1.7 - 7.0 thou/cu mm Final    ABSOLUTE LYMPHOCYTES      2.9 0.9 - 2.9 thou/cu mm Final    ABSOLUTE MONOCYTES        1.2 (H) <0.9 thou/cu mm Final    ABSOLUTE EOSINOPHILS       0.0 <0.5 thou/cu mm Final    ABSOLUTE BASOPHILS        0.1 <0.3 thou/cu mm Final   C-REACTIVE PROTEIN       Result  Value Ref Range Status    C-REACTIVE PROTEIN <1.000 <1.000 mg/dL Final   LIPASE       Result  Value Ref Range Status    LIPASE 13.0 11.0 - 82.0 IU/L Final   URINALYSIS MICROSCOPIC       Result  Value Ref Range Status    RBC 0-2 0-2, None Seen /HPF Final    WBC None Seen 0-2, 3-5, None Seen /HPF Final    BACTERIA                  Few None Seen, Rare, Occasional, Few Bacteria/HPF Final    EPITHELIAL CELLS          Few None Seen, Few Epi/HPF Final    OTHER Mucus Present  Final     ED COURSE:  ED Course     Orders Placed This Encounter      CT ABDOMEN PELVIS WWO      CBC and Differential       Comprehensive Metabolic Panel      Urinalysis with Reflex Microscopic if Positive      Urine Pregnancy      LACTATE,BLOOD      BLOOD CULTURE      URINALYSIS W REFLEX MICROSCOPIC IF POSITIVE      URINE CULTURE      CBC WITH AUTO DIFFERENTIAL      C-REACTIVE PROTEIN      LIPASE      URINALYSIS MICROSCOPIC      LACTATE,BLOOD      MAGNESIUM      MAGNESIUM      POTASSIUM      EKG 12 Lead      NaCl 0.9%  1,000 mL      HYDROmorphone 0.5 mg injection (DILAUDID)      HYDROmorphone 0.5 mg injection (DILAUDID)      ondansetron 4 mg injection (ZOFRAN)      DISCONTD: HYDROmorphone 0.5 mg injection (DILAUDID)      acetaminophen 1,000 mg tablet (TYLENOL EXTRA STRGTH)      NaCl 0.9%  999 mL      iohexol (350 mg/mL) 100 mL injection 100 mL bottle (OMNIPAQUE 350)      HYDROmorphone 0.5 mg injection (DILAUDID)      piperacillin-tazobactam 4.5 g in D5W 100mL (ZOSYN)      naloxone 0.08 mg injection (NARCAN)      ondansetron 4 mg injection (ZOFRAN)      magnesium REPLACEMENT protocol      potassium REPLACEMENT protocol      lactated Ringers infusion      HYDROmorphone 0.5 mg injection (DILAUDID)    IMPRESSION and PLAN:  Patient seen/evaluated upon presentation to the ED.  She was quite distressed upon presentation to the ED and arching  back trying to find position of comfort.  Due to initial presentation, I checked for meningeal signs, but these were not apparent.  Initial abdominal exam was nontender, but patient was distressed with back pain.  Following administration of analgesia, repeat abdominal exam demonstrated + periumbilical abdominal tenderness without rigidity or rebound.  Following negative urine studies, suspecting acute abdominal inflammatory process given fever, leukocytosis.  Ct performed and discussed with radiologist as above.  Patient discussed with on call surgeon, Dr Rodrigues.  Plan for iv zosyn, fluid resuscitation, NPO.  Admit to hospital and likely surgery in a few hours pending surgery evaluation.  Patient/family notified of findings and agreeable to plan.  Symptomatically, the patient was much improved through ED course with good pain control.    ENCOUNTER DIAGNOSES:  ENCOUNTER DIAGNOSES        ICD-10-CM    1. Acute appendicitis, unspecified acute appendicitis type K35.80        MDM  Reviewed: previous chart, nursing note and vitals  Interpretation: labs and ECG  Total time providing critical care: 30-74 minutes. This excludes time spent performing separately reportable procedures and services.  Consults: admitting MD DEAN GALINDO MD  DOS: 6/13/2017   [facility]

## 2017-12-29 NOTE — ED NOTES
Patient Information     Patient Name MRN Sex Ysabel Bailey [id number] Female 1990      ED Nursing Note by Orquidea Gerardo RN at 2017  9:50 PM     Author:  Orquidea Gerardo RN Service:  (none) Author Type:  NURS- Registered Nurse     Filed:  2017 10:32 PM Date of Service:  2017  9:50 PM Status:  Signed     :  Orquidea Gerardo RN (NURS- Registered Nurse)            ED Nursing Assessment  ________________________________    GENERAL APPEARANCE:   Anxious,   EXTREMITIES/SKIN:   Skin intact, diaphoretic  NEUROLOGIC:   Orientation/LOC status: (A) alert and oriented to person, place and date/time  RESPIRATORY:   Denies problem, Breath Sounds are clear  CARDIAC:   Rate is tachycardic, Rhythm (heart) status: see rhythm strips  ABDOMEN/GI:   Contour of abdomen is flat, Bowel sounds are normal  GENITOURINARY:   Denies problem  REPRODUCTIVE:   Denies problem

## 2017-12-29 NOTE — ED NOTES
Patient Information     Patient Name MRN Sex Ysabel Bailey [id number] Female 1990      ED Nursing Note by Orquidea Gerardo RN at 2017 10:16 PM     Author:  Orquidea Gerardo RN Service:  (none) Author Type:  NURS- Registered Nurse     Filed:  2017 10:35 PM Date of Service:  2017 10:16 PM Status:  Signed     :  Orquidea Gerardo RN (NURS- Registered Nurse)            Patient is resting on cot, talking with family.  Rates lower back pain as discomfort of 3/10 after given PRN dilaudid for pain.

## 2017-12-29 NOTE — ED NOTES
Patient Information     Patient Name MRN Sex sYabel Bailey [id number] Female 1990      ED Nursing Note by Orquidea Gerardo RN at 2017  1:10 AM     Author:  Orquidea Gerardo RN Service:  (none) Author Type:  NURS- Registered Nurse     Filed:  2017  1:11 AM Date of Service:  2017  1:10 AM Status:  Signed     :  Orquidea Gerardo RN (NURS- Registered Nurse)            ADMISSION/TRANSPORT NOTE    Ysabel David will be transported to [facility] floor by cart with IV(s). IV Site #1 Status: infusing with fluids: NS  and IV Site #2 Status: capped.    Transport team included: ED Tech    Report called to JIM Dickerson    Pain Level: 2  Acute Pain Intensity (0-10): Moderate pain  Functional Pain Scale (0-5): No Pain    Disposition of Patient Belongings: Cell phone and Clothing

## 2017-12-29 NOTE — ED NOTES
Patient Information     Patient Name MRN Sex Ysabel Bailey [id number] Female 1990      ED Nursing Note by Orquidea Gerardo RN at 2017  9:57 PM     Author:  Orquidea Gerardo RN Service:  (none) Author Type:  NURS- Registered Nurse     Filed:  2017  9:59 PM Date of Service:  2017  9:57 PM Status:  Signed     :  Orquidea Gerardo RN (NURS- Registered Nurse)            Patient trying to void for sample. Patient was able to ambulate with SBA to bathroom next to room.  Rates pain as decreased to 3/10 in lower back.  Patient able to talk in full sentences and is calm at this time.

## 2017-12-29 NOTE — ED NOTES
Patient Information     Patient Name MRN Sex Ysabel Bailey [id number] Female 1990      ED Nursing Note by Orquidea Gerardo RN at 2017 11:22 PM     Author:  Orquidae Gerardo RN Service:  (none) Author Type:  NURS- Registered Nurse     Filed:  2017 11:23 PM Date of Service:  2017 11:22 PM Status:  Signed     :  Orquidea Gerardo RN (NURS- Registered Nurse)            Pt resting with cool washcloth to forehead.  Mother at bedside.  Rates lower back pain as discomfort 2/10 and denies nausea.

## 2017-12-29 NOTE — ED NOTES
"Patient Information     Patient Name MRN Sex Ysabel Bailey [id number] Female 1990      ED Nursing Note by Jayla Headley RN at 2017  8:58 PM     Author:  Jayla Headley RN  Service:  (none) Author Type:  NURS- Registered Nurse     Filed:  2017  9:08 PM  Date of Service:  2017  8:58 PM Status:  Addendum     :  Jayla Headley RN (NURS- Registered Nurse)        Related Notes: Original Note by Jayla Headley RN (NURS- Registered Nurse) filed at 2017  9:01 PM            ED Nursing Triage Note (General)   ________________________________    Patient presents to triage ambulatory, Significant symptoms per patient include pt has chronic back pain in her upper and middle back, but tonight it started to hurt in her lower back and is getting worse.  Pt denies any injury, it started around 1500 today.  She's been trying to use peppermint oil to help with pain but it's not working.  Pain is \"searing hot pain going up both my sides\".  Patient is alert with cooperative.behavior, Breathing noted as labored, General appearance noted as unremarkable with skin of normal color and Action taken triage to critical care immediately    Pre hospital prior living situation: Home      /76  Pulse (!) 136  Temp 98.4  F (36.9  C)  Resp (!) 24  Ht 1.702 m (5' 7\")  Wt 70.3 kg (155 lb)  LMP 2017  SpO2 98%  Breastfeeding? No  BMI 24.28 kg/m2          "

## 2017-12-29 NOTE — ED NOTES
"Patient Information     Patient Name MRN Ysabel Rose [id number] Female 1990      ED Nursing Note by Orquidea Gerardo RN at 2017  9:30 PM     Author:  Orquidea Gerardo RN Service:  (none) Author Type:  NURS- Registered Nurse     Filed:  2017  9:48 PM Date of Service:  2017  9:30 PM Status:  Signed     :  Orquidea Gerardo RN (NURS- Registered Nurse)            Patient placed on cardiac monitor.  Pt states that first dose of dilaudid \"cut pain in half\".  Orders to administer repeat dose of dilaudid.  EKG in progress.         "

## 2017-12-30 NOTE — NURSING NOTE
Patient Information     Patient Name MRN Ysabel Rose [id number] Female 1990      Nursing Note by Michael Win at 2017  2:40 PM     Author:  Michael Win Service:  (none) Author Type:  (none)     Filed:  2017  2:56 PM Encounter Date:  2017 Status:  Signed     :  Michael Win            Patient presents to clinic regarding a post operative visit.  Michael Win LPN............................ 2017 2:42 PM

## 2018-01-24 ENCOUNTER — DOCUMENTATION ONLY (OUTPATIENT)
Dept: FAMILY MEDICINE | Facility: OTHER | Age: 28
End: 2018-01-24

## 2018-01-24 PROBLEM — Z98.890 POSTOPERATIVE STATE: Status: ACTIVE | Noted: 2017-06-21

## 2018-01-24 RX ORDER — DOCUSATE SODIUM 100 MG/1
1 CAPSULE, LIQUID FILLED ORAL 2 TIMES DAILY
COMMUNITY
Start: 2017-06-14 | End: 2019-08-06

## 2018-01-24 RX ORDER — OXYCODONE AND ACETAMINOPHEN 5; 325 MG/1; MG/1
1 TABLET ORAL EVERY 4 HOURS PRN
COMMUNITY
Start: 2017-06-14 | End: 2019-08-06

## 2018-01-24 RX ORDER — DEXTROAMPHETAMINE SACCHARATE, AMPHETAMINE ASPARTATE MONOHYDRATE, DEXTROAMPHETAMINE SULFATE AND AMPHETAMINE SULFATE 7.5; 7.5; 7.5; 7.5 MG/1; MG/1; MG/1; MG/1
30 CAPSULE, EXTENDED RELEASE ORAL EVERY MORNING
COMMUNITY
End: 2020-07-06

## 2018-01-24 RX ORDER — DEXTROAMPHETAMINE SACCHARATE, AMPHETAMINE ASPARTATE, DEXTROAMPHETAMINE SULFATE AND AMPHETAMINE SULFATE 5; 5; 5; 5 MG/1; MG/1; MG/1; MG/1
1 TABLET ORAL DAILY
COMMUNITY
Start: 2014-03-05 | End: 2023-01-23 | Stop reason: DRUGHIGH

## 2018-01-24 RX ORDER — QUETIAPINE FUMARATE 200 MG/1
1 TABLET, FILM COATED ORAL AT BEDTIME
COMMUNITY
Start: 2016-05-12 | End: 2020-06-30

## 2018-01-26 VITALS
HEART RATE: 90 BPM | SYSTOLIC BLOOD PRESSURE: 112 MMHG | BODY MASS INDEX: 23.96 KG/M2 | DIASTOLIC BLOOD PRESSURE: 78 MMHG | WEIGHT: 157.6 LBS

## 2018-03-25 ENCOUNTER — HEALTH MAINTENANCE LETTER (OUTPATIENT)
Age: 28
End: 2018-03-25

## 2019-08-05 ENCOUNTER — TELEPHONE (OUTPATIENT)
Dept: FAMILY MEDICINE | Facility: OTHER | Age: 29
End: 2019-08-05

## 2019-08-05 NOTE — TELEPHONE ENCOUNTER
[facility]-pt out of meds, requesting work in with pcp unless you can convince her to see someone else. Thank you.  /me

## 2019-08-05 NOTE — TELEPHONE ENCOUNTER
Patient is out of seroquel and adderal. Patient has no pills left. Patient has been seeing Dr Bell at [facility] for medications but cannot get in to see him for 3 weeks.   Patient was transferred to [facility]. Leila Noel LPN .............8/5/2019  2:59 PM

## 2019-08-06 ENCOUNTER — OFFICE VISIT (OUTPATIENT)
Dept: FAMILY MEDICINE | Facility: OTHER | Age: 29
End: 2019-08-06
Attending: NURSE PRACTITIONER
Payer: MEDICAID

## 2019-08-06 ENCOUNTER — TELEPHONE (OUTPATIENT)
Dept: FAMILY MEDICINE | Facility: OTHER | Age: 29
End: 2019-08-06

## 2019-08-06 VITALS
BODY MASS INDEX: 24.74 KG/M2 | HEIGHT: 68 IN | DIASTOLIC BLOOD PRESSURE: 82 MMHG | WEIGHT: 163.25 LBS | RESPIRATION RATE: 14 BRPM | SYSTOLIC BLOOD PRESSURE: 118 MMHG | HEART RATE: 76 BPM | TEMPERATURE: 97.3 F

## 2019-08-06 DIAGNOSIS — G47.00 INSOMNIA, UNSPECIFIED TYPE: Primary | ICD-10-CM

## 2019-08-06 PROCEDURE — G0463 HOSPITAL OUTPT CLINIC VISIT: HCPCS | Performed by: NURSE PRACTITIONER

## 2019-08-06 PROCEDURE — 99213 OFFICE O/P EST LOW 20 MIN: CPT | Performed by: NURSE PRACTITIONER

## 2019-08-06 RX ORDER — QUETIAPINE FUMARATE 50 MG/1
50-100 TABLET, FILM COATED ORAL
Qty: 60 TABLET | Refills: 0 | Status: SHIPPED | OUTPATIENT
Start: 2019-08-06 | End: 2020-03-03 | Stop reason: DRUGHIGH

## 2019-08-06 RX ORDER — QUETIAPINE FUMARATE 50 MG/1
50 TABLET, FILM COATED ORAL AT BEDTIME
Refills: 6 | COMMUNITY
Start: 2019-05-07 | End: 2020-07-06

## 2019-08-06 ASSESSMENT — MIFFLIN-ST. JEOR: SCORE: 1529

## 2019-08-06 ASSESSMENT — PAIN SCALES - GENERAL: PAINLEVEL: NO PAIN (0)

## 2019-08-06 NOTE — NURSING NOTE
Patient presents to clinic today for medication management. She states she is mainly looking for her seroquel.     No LMP recorded.  Medication Reconciliation: complete    Dulce Marshall LPN  8/6/2019 9:15 AM

## 2019-08-06 NOTE — PROGRESS NOTES
HPI:    Ysabel David is a 28 year old female who presents to clinic today for medication management.  She reports that she normally sees Dr. Elder for med management of her ADHD meds as well as insomnia meds.  She states she was last saw him about 1 year ago and got 6 months refills at that time.  She is been in [place] for 11 months and so she was putting these prescriptions out to make them last.  She wanted to make an appointment with Dr. Elder but seems is been too long she has to go through the process of restarting some paperwork.  She states she has been out of the Adderall for quite some time.  She typically takes Seroquel 200 mg at bedtime and then has a Seroquel 50 mg at bedtime as needed.  There is no records on the [facility] that she had Adderall filled.  We have been unable to get a hold of Dr. Elder's office to determine prescriptions and appointments completed by them.  Her pharmacy reports that she had Adderall XR 30 mg filled on July 5, 2018 as well as Adderall 20 mg on July 5, 2018.  She also has reports of having Seroquel 200 mg as needed #30 filled on May 7, 2018 and Seroquel 50 mg 1 to 2 tablets at bedtime #60 filled on June 11, 2018 all by Dr. Elder.    Past Medical History:   Diagnosis Date     Borderline personality disorder (H)     ?     Major depressive disorder, single episode     suicidal ideation, hospitalized x 3 in 2012     Personal history of other medical treatment (CODE)     Age 14       Social History     Socioeconomic History     Marital status: Single     Spouse name: Not on file     Number of children: Not on file     Years of education: Not on file     Highest education level: Not on file   Occupational History     Occupation: STUDENT   Social Needs     Financial resource strain: Not on file     Food insecurity:     Worry: Not on file     Inability: Not on file     Transportation needs:     Medical: Not on file     Non-medical: Not on file   Tobacco Use     Smoking  "status: Never Smoker     Smokeless tobacco: Never Used   Substance and Sexual Activity     Alcohol use: Yes     Comment: Alcoholic Drinks/day: socially     Drug use: Not Currently     Types: Marijuana, Other     Comment: Drug use: Yes     Sexual activity: Yes     Partners: Male     Birth control/protection: Pill   Lifestyle     Physical activity:     Days per week: Not on file     Minutes per session: Not on file     Stress: Not on file   Relationships     Social connections:     Talks on phone: Not on file     Gets together: Not on file     Attends Mosque service: Not on file     Active member of club or organization: Not on file     Attends meetings of clubs or organizations: Not on file     Relationship status: Not on file     Intimate partner violence:     Fear of current or ex partner: Not on file     Emotionally abused: Not on file     Physically abused: Not on file     Forced sexual activity: Not on file   Other Topics Concern     Not on file   Social History Narrative    She was home schooled.  Involved in drama at the high school.       Current Outpatient Medications   Medication Sig Dispense Refill     amphetamine-dextroamphetamine (ADDERALL XR) 30 MG per 24 hr capsule Take 30 mg by mouth every morning       amphetamine-dextroamphetamine (ADDERALL) 20 MG per tablet Take 1 tablet by mouth daily       QUEtiapine (SEROQUEL) 200 MG tablet Take 1 tablet by mouth At Bedtime       QUEtiapine (SEROQUEL) 50 MG tablet Take 50 mg by mouth At Bedtime  6     QUEtiapine (SEROQUEL) 50 MG tablet Take 1-2 tablets ( mg) by mouth nightly as needed (insomnia) 60 tablet 0       No Known Allergies    ROS:  Pertinent positives and negatives are noted in HPI.    EXAM:  /82 (BP Location: Right arm, Patient Position: Sitting, Cuff Size: Adult Regular)   Pulse 76   Temp 97.3  F (36.3  C) (Tympanic)   Resp 14   Ht 1.727 m (5' 8\")   Wt 74 kg (163 lb 4 oz)   LMP  (LMP Unknown)   Breastfeeding? No   BMI 24.82 " kg/m    General appearance: well appearing female, in no acute distress  Psychological: normal affect, alert and pleasant    PHQ Depression Screen  PHQ-9 SCORE 5/12/2016   PHQ-9 Total Score 9       ASSESSMENT AND PLAN:    1. Insomnia, unspecified type        Her primary concern today is that she is not sleeping.  She has some work deadlines and is worried about getting this completed.  She is hopeful to get her Adderall and Seroquel refilled today.  I do not have any records regarding her Adderall and have been unable to get a hold of Dr. Elder's office.  At this time I did agree to refill the last prescription that was filled at the pharmacy, Seroquel 50 mill grams 1 to 2 tablets at at bedtime as needed.  She will work on getting an appointment set up with Dr. Elder for further med management.    I spent approximately 15 minutes with the patient (exclusive of separately billed services/procedures), with greater than 50% spent in counseling, prognosis, risks and benefits of management or follow-up.  Reviewed importance of compliance with chosen treatment options and follow-up.  Risk factor reduction and patient education and coordinating care, establishing and/or reviewing the patient's medical record also completed during today's exam .      Shreya Bonilla..................8/6/2019 9:07 AM      This document was prepared using voice generated software.  While every attempt was made for accuracy, grammatical errors may exist.

## 2019-08-06 NOTE — TELEPHONE ENCOUNTER
Noted. Patient was given short supply of seroquel and has to re establish with psych to get other medications refilled.  Dulce Marshall LPN...................8/6/2019  9:33 AM

## 2020-02-19 ENCOUNTER — TELEPHONE (OUTPATIENT)
Dept: FAMILY MEDICINE | Facility: OTHER | Age: 30
End: 2020-02-19

## 2020-03-03 ENCOUNTER — OFFICE VISIT (OUTPATIENT)
Dept: FAMILY MEDICINE | Facility: OTHER | Age: 30
End: 2020-03-03
Attending: FAMILY MEDICINE
Payer: COMMERCIAL

## 2020-03-03 VITALS
HEART RATE: 92 BPM | WEIGHT: 181.6 LBS | RESPIRATION RATE: 16 BRPM | HEIGHT: 69 IN | BODY MASS INDEX: 26.9 KG/M2 | OXYGEN SATURATION: 99 % | DIASTOLIC BLOOD PRESSURE: 80 MMHG | SYSTOLIC BLOOD PRESSURE: 132 MMHG | TEMPERATURE: 98.3 F

## 2020-03-03 DIAGNOSIS — B36.0 TINEA VERSICOLOR: Primary | ICD-10-CM

## 2020-03-03 PROCEDURE — G0463 HOSPITAL OUTPT CLINIC VISIT: HCPCS

## 2020-03-03 PROCEDURE — 99213 OFFICE O/P EST LOW 20 MIN: CPT | Performed by: FAMILY MEDICINE

## 2020-03-03 RX ORDER — KETOCONAZOLE 20 MG/G
CREAM TOPICAL 2 TIMES DAILY
Qty: 30 G | Refills: 3 | Status: SHIPPED | OUTPATIENT
Start: 2020-03-03 | End: 2020-06-13

## 2020-03-03 RX ORDER — FLUCONAZOLE 150 MG/1
TABLET ORAL
Qty: 4 TABLET | Refills: 3 | Status: SHIPPED | OUTPATIENT
Start: 2020-03-03 | End: 2020-06-13

## 2020-03-03 RX ORDER — VENLAFAXINE HYDROCHLORIDE 150 MG/1
CAPSULE, EXTENDED RELEASE ORAL
COMMUNITY
Start: 2019-12-11 | End: 2020-06-24

## 2020-03-03 RX ORDER — FLUCONAZOLE 150 MG/1
150 TABLET ORAL DAILY
Qty: 3 TABLET | Refills: 0 | Status: SHIPPED | OUTPATIENT
Start: 2020-03-03 | End: 2020-03-03

## 2020-03-03 ASSESSMENT — MIFFLIN-ST. JEOR: SCORE: 1615.17

## 2020-03-03 ASSESSMENT — PAIN SCALES - GENERAL: PAINLEVEL: NO PAIN (0)

## 2020-03-03 ASSESSMENT — PATIENT HEALTH QUESTIONNAIRE - PHQ9: SUM OF ALL RESPONSES TO PHQ QUESTIONS 1-9: 14

## 2020-03-03 NOTE — NURSING NOTE
Has had spots on upper abdomen for 2-6 months. She is unsure of how long. Is not painful or itchy. Medication list reviewed and completed.  HALEIGH GOOD LPN on 3/3/2020 at 4:01 PM

## 2020-03-03 NOTE — PATIENT INSTRUCTIONS
Patient Education     Tinea Versicolor  This is a rash caused by a fungus in the top layers of the skin. This fungus is normally present in the pores of the skin and causes no symptoms. But when the fungus overgrows it causes a rash. The fungus grows more easily in hot climates, and on oily or sweaty skin. Health experts don t know why some people get this rash and others don t. Experts also don t know why the rash will suddenly appear in someone who has never had it before.  The rash is made up of irregular pale or tan spots and patches. The rash is usually on the neck, upper back, chest, and shoulders. You may have mild itching, especially if you become overheated. But it doesn't cause other symptoms. Because these spots don't change color with sun exposure like normal skin, the rash may be lighter or darker than your normal skin.  This rash is harmless and usually causes no symptoms. The only reason for treatment is to improve appearance. Follow the advice below to clear the rash. It might take several months for normal skin color to return.  Home care    Use a medicated dandruff shampoo over your whole body while in the shower. Don t use soap. Let the shampoo stay on for at least a few minutes before rinsing off. Do this every day for 4 weeks.    As a different treatment, you may buy an antifungal cream (miconazole or clotrimazole, both available without a prescription). Use this 2 times a day for 7 days.     This rash is not contagious to others. It can t be spread if someone touches it. So you don t have to worry about exposing others at school, , or work.  Prevention  This fungus can come back again (recur) after treatment. To prevent return of the rash, use medicated dandruff shampoo over your whole body when in the shower. Do this once a month for the next year. This is very important to do in the summertime. That is when the rash is most likely to recur.  Other prevention tips include:    Avoid  oily skin products    Wear loose clothing. Try to let your skin stay cool and breathe.    Use sunscreen and protect yourself from sunlight    Avoid tanning beds  Follow-up care  Follow up with your healthcare provider, or as advised. Call your provider if the rash doesn t get better with the above treatment, or if new symptoms appear.  When to seek medical advice  Call your healthcare provider right away if any of these occur:    Increasing redness of the rash    Change in appearance of the rash    Fever of 100.4 F (38 C) or higher, or as directed by your provider  Date Last Reviewed: 8/1/2016 2000-2019 [employer]. [address] [place] [address], [place] [address]. All rights reserved. This information is not intended as a substitute for professional medical care. Always follow your healthcare professional's instructions.

## 2020-03-03 NOTE — PROGRESS NOTES
Nursing Notes:   Milagro Mendoza LPN  3/3/2020  4:23 PM  Signed  Has had spots on upper abdomen for 2-6 months. She is unsure of how long. Is not painful or itchy. Medication list reviewed and completed.  HALEIGH GOOD LPN on 3/3/2020 at 4:01 PM    SUBJECTIVE:  Ysabel David  is a 29 year old female who comes in because of spots on her upper abdomen.  Present for a long time. No itch or pain.     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 She is working between [place] and here. 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     Past Medical, Family, and Social History reviewed and updated as noted below.   ROS is negative except as noted above       No Known Allergies,   Family History   Problem Relation Age of Onset     Breast Cancer Maternal Grandmother         Cancer-breast     Other - See Comments Brother         Psychiatric illness,Severe depression     Colon Cancer No family hx of         Cancer-colon     Prostate Cancer No family hx of         Cancer-prostate     Ovarian Cancer No family hx of         Cancer-ovarian     Blood Disease No family hx of         Blood Disease     Asthma No family hx of         Asthma     Heart Disease No family hx of         Heart Disease     Diabetes No family hx of         Diabetes     Hypertension No family hx of         Hypertension     Seizure Disorder No family hx of         Seizures     Thyroid Disease No family hx of         Thyroid Disease   ,   Current Outpatient Medications   Medication     amphetamine-dextroamphetamine (ADDERALL XR) 30 MG per 24 hr capsule     amphetamine-dextroamphetamine (ADDERALL) 20 MG per tablet     fluconazole (DIFLUCAN) 150 MG tablet     ketoconazole (NIZORAL) 2 % external cream     QUEtiapine (SEROQUEL) 200 MG tablet     QUEtiapine (SEROQUEL) 50 MG tablet      "venlafaxine (EFFEXOR-XR) 150 MG 24 hr capsule     No current facility-administered medications for this visit.    ,   Past Medical History:   Diagnosis Date     Borderline personality disorder (H)     ?     Major depressive disorder, single episode     suicidal ideation, hospitalized x 3 in 2012     Personal history of other medical treatment (CODE)     Age 14   ,   Patient Active Problem List    Diagnosis Date Noted     Postoperative state 06/21/2017     Priority: Medium     Headache 03/01/2011     Priority: Medium     Insomnia 03/01/2011     Priority: Medium     Other syndromes affecting cervical region 03/01/2011     Priority: Medium   ,   Past Surgical History:   Procedure Laterality Date     APPENDECTOMY OPEN      06/14/2017,Lap Appy    and   Social History     Tobacco Use     Smoking status: Never Smoker     Smokeless tobacco: Never Used   Substance Use Topics     Alcohol use: Yes     Comment: Alcoholic Drinks/day: socially     OBJECTIVE:  /80   Pulse 92   Temp 98.3  F (36.8  C)   Resp 16   Ht 1.74 m (5' 8.5\")   Wt 82.4 kg (181 lb 9.6 oz)   LMP 02/25/2020   SpO2 99%   Breastfeeding No   BMI 27.21 kg/m     EXAM:  Alert and cooperative, no distress.  She has typical tinea versicolor across her shoulders and on the top part of her back and on her chest.  ASSESSMENT/Plan :    Ysabel was seen today for derm problem.    Diagnoses and all orders for this visit:    Tinea versicolor  -     Discontinue: fluconazole (DIFLUCAN) 150 MG tablet; Take 1 tablet (150 mg) by mouth daily for 3 days  -     ketoconazole (NIZORAL) 2 % external cream; Apply topically 2 times daily  -     fluconazole (DIFLUCAN) 150 MG tablet; 2 tab po today, repeat in one week.      Diflucan 300 mg today and repeat in 1 week.  Refills given.  Nizoral cream topically. Discussed the pathophysiology of the condition in detail and treatment options.  Follow-up if worsening or not improving.    Zay Carlos MD            "

## 2020-06-13 ENCOUNTER — HOSPITAL ENCOUNTER (EMERGENCY)
Facility: OTHER | Age: 30
Discharge: HOME | End: 2020-06-14
Attending: FAMILY MEDICINE | Admitting: FAMILY MEDICINE
Payer: COMMERCIAL

## 2020-06-13 ENCOUNTER — APPOINTMENT (OUTPATIENT)
Dept: GENERAL RADIOLOGY | Facility: OTHER | Age: 30
End: 2020-06-13
Attending: FAMILY MEDICINE
Payer: COMMERCIAL

## 2020-06-13 DIAGNOSIS — F41.9 ANXIETY: ICD-10-CM

## 2020-06-13 DIAGNOSIS — E87.6 HYPOKALEMIA: ICD-10-CM

## 2020-06-13 DIAGNOSIS — F32.A DEPRESSION, UNSPECIFIED DEPRESSION TYPE: ICD-10-CM

## 2020-06-13 DIAGNOSIS — R07.89 CHEST PAIN, NON-CARDIAC: ICD-10-CM

## 2020-06-13 DIAGNOSIS — E83.42 HYPOMAGNESEMIA: ICD-10-CM

## 2020-06-13 LAB
ALBUMIN SERPL-MCNC: 4.1 G/DL (ref 3.5–5.7)
ALP SERPL-CCNC: 39 U/L (ref 34–104)
ALT SERPL W P-5'-P-CCNC: 10 U/L (ref 7–52)
AMPHETAMINES UR QL SCN: ABNORMAL
ANION GAP SERPL CALCULATED.3IONS-SCNC: 9 MMOL/L (ref 3–14)
APAP SERPL-MCNC: <0.2 UG/ML (ref 0–30)
APTT PPP: 28 SEC (ref 22–37)
AST SERPL W P-5'-P-CCNC: 13 U/L (ref 13–39)
BARBITURATES UR QL: NOT DETECTED
BASOPHILS # BLD AUTO: 0 10E9/L (ref 0–0.2)
BASOPHILS NFR BLD AUTO: 0.4 %
BENZODIAZ UR QL: NOT DETECTED
BILIRUB SERPL-MCNC: 0.5 MG/DL (ref 0.3–1)
BUN SERPL-MCNC: 9 MG/DL (ref 7–25)
BUPRENORPHINE UR QL: NOT DETECTED NG/ML
CALCIUM SERPL-MCNC: 8.9 MG/DL (ref 8.6–10.3)
CANNABINOIDS UR QL: ABNORMAL NG/ML
CHLORIDE SERPL-SCNC: 103 MMOL/L (ref 98–107)
CO2 SERPL-SCNC: 23 MMOL/L (ref 21–31)
COCAINE UR QL: NOT DETECTED
CREAT SERPL-MCNC: 0.78 MG/DL (ref 0.6–1.2)
D DIMER PPP DDU-MCNC: <200 NG/ML D-DU (ref 0–230)
D-METHAMPHET UR QL: NOT DETECTED NG/ML
DIFFERENTIAL METHOD BLD: NORMAL
EOSINOPHIL # BLD AUTO: 0.1 10E9/L (ref 0–0.7)
EOSINOPHIL NFR BLD AUTO: 1 %
ERYTHROCYTE [DISTWIDTH] IN BLOOD BY AUTOMATED COUNT: 11.4 % (ref 10–15)
ETHANOL SERPL-MCNC: <0.01 %
GFR SERPL CREATININE-BSD FRML MDRD: 89 ML/MIN/{1.73_M2}
GLUCOSE SERPL-MCNC: 115 MG/DL (ref 70–105)
HCG UR QL: NEGATIVE
HCT VFR BLD AUTO: 41.8 % (ref 35–47)
HGB BLD-MCNC: 14.1 G/DL (ref 11.7–15.7)
IMM GRANULOCYTES # BLD: 0 10E9/L (ref 0–0.4)
IMM GRANULOCYTES NFR BLD: 0.3 %
INR PPP: 0.87 (ref 0–1.3)
LYMPHOCYTES # BLD AUTO: 2 10E9/L (ref 0.8–5.3)
LYMPHOCYTES NFR BLD AUTO: 28.3 %
MAGNESIUM SERPL-MCNC: 1.7 MG/DL (ref 1.9–2.7)
MCH RBC QN AUTO: 31.6 PG (ref 26.5–33)
MCHC RBC AUTO-ENTMCNC: 33.7 G/DL (ref 31.5–36.5)
MCV RBC AUTO: 94 FL (ref 78–100)
METHADONE UR QL SCN: NOT DETECTED
MONOCYTES # BLD AUTO: 0.6 10E9/L (ref 0–1.3)
MONOCYTES NFR BLD AUTO: 8.1 %
NEUTROPHILS # BLD AUTO: 4.3 10E9/L (ref 1.6–8.3)
NEUTROPHILS NFR BLD AUTO: 61.9 %
NT-PROBNP SERPL-MCNC: 7 PG/ML (ref 0–100)
OPIATES UR QL SCN: NOT DETECTED
OXYCODONE UR QL: NOT DETECTED NG/ML
PCP UR QL SCN: NOT DETECTED
PLATELET # BLD AUTO: 216 10E9/L (ref 150–450)
POTASSIUM SERPL-SCNC: 3.1 MMOL/L (ref 3.5–5.1)
PROPOXYPH UR QL: NOT DETECTED NG/ML
PROT SERPL-MCNC: 6.8 G/DL (ref 6.4–8.9)
RBC # BLD AUTO: 4.46 10E12/L (ref 3.8–5.2)
SALICYLATES SERPL-MCNC: <0 MG/DL (ref 15–30)
SODIUM SERPL-SCNC: 135 MMOL/L (ref 134–144)
TRICYCLICS UR QL SCN: NOT DETECTED NG/ML
TROPONIN I SERPL-MCNC: <2.3 PG/ML
WBC # BLD AUTO: 7 10E9/L (ref 4–11)

## 2020-06-13 PROCEDURE — 85025 COMPLETE CBC W/AUTO DIFF WBC: CPT | Performed by: FAMILY MEDICINE

## 2020-06-13 PROCEDURE — 25800030 ZZH RX IP 258 OP 636: Performed by: FAMILY MEDICINE

## 2020-06-13 PROCEDURE — 80053 COMPREHEN METABOLIC PANEL: CPT | Performed by: FAMILY MEDICINE

## 2020-06-13 PROCEDURE — 84484 ASSAY OF TROPONIN QUANT: CPT | Performed by: FAMILY MEDICINE

## 2020-06-13 PROCEDURE — 85379 FIBRIN DEGRADATION QUANT: CPT | Performed by: FAMILY MEDICINE

## 2020-06-13 PROCEDURE — 99285 EMERGENCY DEPT VISIT HI MDM: CPT | Mod: 25 | Performed by: FAMILY MEDICINE

## 2020-06-13 PROCEDURE — 96365 THER/PROPH/DIAG IV INF INIT: CPT | Performed by: FAMILY MEDICINE

## 2020-06-13 PROCEDURE — 80329 ANALGESICS NON-OPIOID 1 OR 2: CPT | Performed by: FAMILY MEDICINE

## 2020-06-13 PROCEDURE — 96375 TX/PRO/DX INJ NEW DRUG ADDON: CPT | Performed by: FAMILY MEDICINE

## 2020-06-13 PROCEDURE — 85730 THROMBOPLASTIN TIME PARTIAL: CPT | Performed by: FAMILY MEDICINE

## 2020-06-13 PROCEDURE — 80307 DRUG TEST PRSMV CHEM ANLYZR: CPT | Performed by: FAMILY MEDICINE

## 2020-06-13 PROCEDURE — 85610 PROTHROMBIN TIME: CPT | Performed by: FAMILY MEDICINE

## 2020-06-13 PROCEDURE — 83880 ASSAY OF NATRIURETIC PEPTIDE: CPT | Performed by: FAMILY MEDICINE

## 2020-06-13 PROCEDURE — 71045 X-RAY EXAM CHEST 1 VIEW: CPT

## 2020-06-13 PROCEDURE — 83735 ASSAY OF MAGNESIUM: CPT | Performed by: FAMILY MEDICINE

## 2020-06-13 PROCEDURE — 36415 COLL VENOUS BLD VENIPUNCTURE: CPT | Performed by: FAMILY MEDICINE

## 2020-06-13 PROCEDURE — 80320 DRUG SCREEN QUANTALCOHOLS: CPT | Mod: 91 | Performed by: FAMILY MEDICINE

## 2020-06-13 PROCEDURE — 93005 ELECTROCARDIOGRAM TRACING: CPT | Performed by: FAMILY MEDICINE

## 2020-06-13 PROCEDURE — 81025 URINE PREGNANCY TEST: CPT | Performed by: FAMILY MEDICINE

## 2020-06-13 PROCEDURE — 25000128 H RX IP 250 OP 636: Performed by: FAMILY MEDICINE

## 2020-06-13 PROCEDURE — 93010 ELECTROCARDIOGRAM REPORT: CPT | Performed by: INTERNAL MEDICINE

## 2020-06-13 PROCEDURE — 99284 EMERGENCY DEPT VISIT MOD MDM: CPT | Mod: Z6 | Performed by: FAMILY MEDICINE

## 2020-06-13 PROCEDURE — 25000132 ZZH RX MED GY IP 250 OP 250 PS 637: Performed by: FAMILY MEDICINE

## 2020-06-13 PROCEDURE — 80329 ANALGESICS NON-OPIOID 1 OR 2: CPT | Mod: 91 | Performed by: FAMILY MEDICINE

## 2020-06-13 RX ORDER — POTASSIUM CHLORIDE 1500 MG/1
40 TABLET, EXTENDED RELEASE ORAL ONCE
Status: COMPLETED | OUTPATIENT
Start: 2020-06-13 | End: 2020-06-13

## 2020-06-13 RX ORDER — POTASSIUM CHLORIDE 1.5 G/1.58G
40 POWDER, FOR SOLUTION ORAL ONCE
Status: DISCONTINUED | OUTPATIENT
Start: 2020-06-13 | End: 2020-06-13 | Stop reason: RX

## 2020-06-13 RX ORDER — MAGNESIUM SULFATE HEPTAHYDRATE 40 MG/ML
2 INJECTION, SOLUTION INTRAVENOUS ONCE
Status: COMPLETED | OUTPATIENT
Start: 2020-06-13 | End: 2020-06-13

## 2020-06-13 RX ORDER — LABETALOL HYDROCHLORIDE 5 MG/ML
10 INJECTION, SOLUTION INTRAVENOUS ONCE
Status: COMPLETED | OUTPATIENT
Start: 2020-06-13 | End: 2020-06-13

## 2020-06-13 RX ORDER — ONDANSETRON 2 MG/ML
4 INJECTION INTRAMUSCULAR; INTRAVENOUS ONCE
Status: COMPLETED | OUTPATIENT
Start: 2020-06-13 | End: 2020-06-13

## 2020-06-13 RX ADMIN — ONDANSETRON 4 MG: 2 INJECTION INTRAMUSCULAR; INTRAVENOUS at 21:03

## 2020-06-13 RX ADMIN — POTASSIUM CHLORIDE 40 MEQ: 1500 TABLET, EXTENDED RELEASE ORAL at 21:40

## 2020-06-13 RX ADMIN — MAGNESIUM SULFATE IN WATER 2 G: 40 INJECTION, SOLUTION INTRAVENOUS at 21:42

## 2020-06-13 RX ADMIN — SODIUM CHLORIDE 1000 ML: 9 INJECTION, SOLUTION INTRAVENOUS at 21:03

## 2020-06-13 RX ADMIN — LABETALOL HYDROCHLORIDE 10 MG: 5 INJECTION INTRAVENOUS at 21:08

## 2020-06-13 ASSESSMENT — ENCOUNTER SYMPTOMS
ABDOMINAL PAIN: 0
DYSURIA: 0
NAUSEA: 0
DYSPHORIC MOOD: 1
VOMITING: 0
HALLUCINATIONS: 0
COUGH: 0
BACK PAIN: 0
SORE THROAT: 0
FEVER: 0
NERVOUS/ANXIOUS: 1
PALPITATIONS: 0
LIGHT-HEADEDNESS: 0
WOUND: 0
SHORTNESS OF BREATH: 1

## 2020-06-13 ASSESSMENT — MIFFLIN-ST. JEOR: SCORE: 1509.26

## 2020-06-13 NOTE — ED AVS SNAPSHOT
[facility]  [address]  [place] [address]  Phone:  [phone]  Fax:  [phone]                                    Ysabel David   MRN: [id number]    Department:  [facility]   Date of Visit:  6/13/2020           After Visit Summary Signature Page    I have received my discharge instructions, and my questions have been answered. I have discussed any challenges I see with this plan with the nurse or doctor.    ..........................................................................................................................................  Patient/Patient Representative Signature      ..........................................................................................................................................  Patient Representative Print Name and Relationship to Patient    ..................................................               ................................................  Date                                   Time    ..........................................................................................................................................  Reviewed by Signature/Title    ...................................................              ..............................................  Date                                               Time          22EPIC Rev 08/18

## 2020-06-14 VITALS
WEIGHT: 160 LBS | HEART RATE: 90 BPM | DIASTOLIC BLOOD PRESSURE: 89 MMHG | OXYGEN SATURATION: 98 % | HEIGHT: 68 IN | BODY MASS INDEX: 24.25 KG/M2 | RESPIRATION RATE: 16 BRPM | TEMPERATURE: 98.5 F | SYSTOLIC BLOOD PRESSURE: 138 MMHG

## 2020-06-14 NOTE — ED PROVIDER NOTES
History     Chief Complaint   Patient presents with     Chest Pain     HPI  Ysabel David is a 29 year old female who presents to ED with Chest pain. Patient reports the pain began approximately 30 minutes prior to arrival. The patient's chest pain has been across the right and left anterior chest. Pain described as severe tightness that was initially 8 out of 10 for severity and is presently 1 out of 10. The chest pain does not radiate. Last episode of pain occurred at approximately 8 PM and lasted 30 minutes and resolved. Patient has associated shortness of breath, nausea, vomiting, anxiety. Denies associated fever, chills, sore throat, cough, palpitations, lower extremity pain or swelling, abdominal pain, diarrhea, dysuria. Pain is exacerbated by nothing and relieved by nothing. Patient took nothing prior to arrival.  She denies any history of similar pain.   The patient also complains of depression with ongoing suicidal thoughts.  Patient states that she presently has a very stressful living situation at home.  She is presently living with her parents due to financial constraints.  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  She also reports that she is not really getting along with her parents who do not like her boyfriend.  Patient states that her depression and anxiety have increased recently.  She reports that she does have suicidal ideation.  She states that she has had problems with depression and suicidal ideation since she was 19 so for approximately 10 years.  She states she does have a plan to overdose on alcohol and sleeping pills but reports that she does not have any intention of doing that today.  She denies any hallucinations or thoughts of harming others.  She has not had any alcohol or drugs today.    Patient denies other complaints.    Cardiologist: None    Allergies:  No Known Allergies    Problem List:    Patient Active  Problem List    Diagnosis Date Noted     Postoperative state 06/21/2017     Priority: Medium     Headache 03/01/2011     Priority: Medium     Insomnia 03/01/2011     Priority: Medium     Other syndromes affecting cervical region 03/01/2011     Priority: Medium        Past Medical History:    Past Medical History:   Diagnosis Date     Borderline personality disorder (H)      Major depressive disorder, single episode      Personal history of other medical treatment (CODE)        Past Surgical History:    Past Surgical History:   Procedure Laterality Date     APPENDECTOMY OPEN      06/14/2017,Lap [place]       Family History:    Family History   Problem Relation Age of Onset     Breast Cancer Maternal Grandmother         Cancer-breast     Other - See Comments Brother         Psychiatric illness,Severe depression     Colon Cancer No family hx of         Cancer-colon     Prostate Cancer No family hx of         Cancer-prostate     Ovarian Cancer No family hx of         Cancer-ovarian     Blood Disease No family hx of         Blood Disease     Asthma No family hx of         Asthma     Heart Disease No family hx of         Heart Disease     Diabetes No family hx of         Diabetes     Hypertension No family hx of         Hypertension     Seizure Disorder No family hx of         Seizures     Thyroid Disease No family hx of         Thyroid Disease       Social History:  Marital Status:  Single [1]  Social History     Tobacco Use     Smoking status: Never Smoker     Smokeless tobacco: Never Used   Substance Use Topics     Alcohol use: Yes     Comment: Alcoholic Drinks/day: socially     Drug use: Not Currently     Types: Marijuana     Comment: Drug use: Yes        Medications:    amphetamine-dextroamphetamine (ADDERALL) 20 MG per tablet  QUEtiapine (SEROQUEL) 200 MG tablet  amphetamine-dextroamphetamine (ADDERALL XR) 30 MG per 24 hr capsule  QUEtiapine (SEROQUEL) 50 MG tablet  venlafaxine (EFFEXOR-XR) 150 MG 24 hr  "capsule          Review of Systems   Constitutional: Negative for fever.   HENT: Negative for congestion and sore throat.    Eyes: Negative for visual disturbance.   Respiratory: Positive for shortness of breath. Negative for cough.    Cardiovascular: Positive for chest pain. Negative for palpitations and leg swelling.   Gastrointestinal: Negative for abdominal pain, nausea and vomiting.   Genitourinary: Negative for dysuria.   Musculoskeletal: Negative for back pain.   Skin: Negative for wound.   Neurological: Negative for light-headedness.   Psychiatric/Behavioral: Positive for dysphoric mood and suicidal ideas. Negative for hallucinations and self-injury. The patient is nervous/anxious.        Physical Exam   BP: (!) 159/99  Pulse: 96  Heart Rate: 97  Temp: 99.5  F (37.5  C)  Resp: 16  Height: 172.7 cm (5' 8\")  Weight: 72.6 kg (160 lb)  SpO2: 99 %      Physical Exam  Vitals signs and nursing note reviewed.   Constitutional:       General: She is not in acute distress.     Appearance: Normal appearance. She is well-developed. She is not ill-appearing, toxic-appearing or diaphoretic.   HENT:      Head: Normocephalic and atraumatic.      Right Ear: External ear normal.      Left Ear: External ear normal.      Nose: Nose normal.      Mouth/Throat:      Lips: Pink.      Mouth: Mucous membranes are moist.      Pharynx: Oropharynx is clear. Uvula midline.   Eyes:      Extraocular Movements: Extraocular movements intact.      Conjunctiva/sclera: Conjunctivae normal.      Pupils: Pupils are equal, round, and reactive to light.   Neck:      Musculoskeletal: Normal range of motion and neck supple.      Trachea: Trachea and phonation normal.   Cardiovascular:      Rate and Rhythm: Normal rate and regular rhythm.      Pulses: Normal pulses.      Heart sounds: Normal heart sounds. No murmur.   Pulmonary:      Effort: Pulmonary effort is normal.      Breath sounds: Normal breath sounds. No decreased breath sounds, wheezing, " rhonchi or rales.   Abdominal:      General: Bowel sounds are normal.      Palpations: Abdomen is soft.      Tenderness: There is no abdominal tenderness.   Musculoskeletal: Normal range of motion.         General: No tenderness.      Right lower leg: No edema.      Left lower leg: No edema.   Lymphadenopathy:      Cervical: No cervical adenopathy.   Skin:     General: Skin is warm.      Capillary Refill: Capillary refill takes less than 2 seconds.      Coloration: Skin is not pale.   Neurological:      Mental Status: She is alert and oriented to person, place, and time.   Psychiatric:         Attention and Perception: Attention and perception normal.         Mood and Affect: Mood is anxious.         Speech: Speech normal.         Behavior: Behavior normal. Behavior is cooperative.         Thought Content: Thought content is not paranoid or delusional. Thought content includes suicidal ideation. Thought content does not include homicidal ideation. Thought content includes suicidal plan. Thought content does not include homicidal plan.         Cognition and Memory: Cognition and memory normal.         Judgment: Judgment normal.         ED Course     Procedures       EKG Interpretation:      Interpreted by Abdi Nelson MD, MD  Time reviewed: 2027  Symptoms at time of EKG: SOB   Rhythm: normal sinus   Rate: normal  Axis: normal  Ectopy: none  Conduction: normal  ST Segments/ T Waves: No ST-T wave changes  Q Waves: none  Comparison to prior: No old EKG available    Clinical Impression: normal EKG    Critical Care time:  none  Results for orders placed or performed during the hospital encounter of 06/13/20 (from the past 24 hour(s))   CBC with platelets differential   Result Value Ref Range    WBC 7.0 4.0 - 11.0 10e9/L    RBC Count 4.46 3.8 - 5.2 10e12/L    Hemoglobin 14.1 11.7 - 15.7 g/dL    Hematocrit 41.8 35.0 - 47.0 %    MCV 94 78 - 100 fl    MCH 31.6 26.5 - 33.0 pg    MCHC 33.7 31.5 - 36.5 g/dL    RDW 11.4 10.0  - 15.0 %    Platelet Count 216 150 - 450 10e9/L    Diff Method Automated Method     % Neutrophils 61.9 %    % Lymphocytes 28.3 %    % Monocytes 8.1 %    % Eosinophils 1.0 %    % Basophils 0.4 %    % Immature Granulocytes 0.3 %    Absolute Neutrophil 4.3 1.6 - 8.3 10e9/L    Absolute Lymphocytes 2.0 0.8 - 5.3 10e9/L    Absolute Monocytes 0.6 0.0 - 1.3 10e9/L    Absolute Eosinophils 0.1 0.0 - 0.7 10e9/L    Absolute Basophils 0.0 0.0 - 0.2 10e9/L    Abs Immature Granulocytes 0.0 0 - 0.4 10e9/L   Troponin GH   Result Value Ref Range    Troponin <2.3 <34.0 pg/mL   INR   Result Value Ref Range    INR 0.87 0 - 1.3   Partial thromboplastin time   Result Value Ref Range    PTT 28 22 - 37 sec   Comprehensive metabolic panel   Result Value Ref Range    Sodium 135 134 - 144 mmol/L    Potassium 3.1 (L) 3.5 - 5.1 mmol/L    Chloride 103 98 - 107 mmol/L    Carbon Dioxide 23 21 - 31 mmol/L    Anion Gap 9 3 - 14 mmol/L    Glucose 115 (H) 70 - 105 mg/dL    Urea Nitrogen 9 7 - 25 mg/dL    Creatinine 0.78 0.60 - 1.20 mg/dL    GFR Estimate 89 >60 mL/min/[1.73_m2]    GFR Estimate If Black >90 >60 mL/min/[1.73_m2]    Calcium 8.9 8.6 - 10.3 mg/dL    Bilirubin Total 0.5 0.3 - 1.0 mg/dL    Albumin 4.1 3.5 - 5.7 g/dL    Protein Total 6.8 6.4 - 8.9 g/dL    Alkaline Phosphatase 39 34 - 104 U/L    ALT 10 7 - 52 U/L    AST 13 13 - 39 U/L   Magnesium   Result Value Ref Range    Magnesium 1.7 (L) 1.9 - 2.7 mg/dL   D-Dimer GH   Result Value Ref Range    D-Dimer ng/mL <200 0 - 230 ng/ml D-DU   Nt probnp inpatient (BNP)   Result Value Ref Range    N-Terminal Pro BNP Inpatient 7 0 - 100 pg/mL   Salicylate level   Result Value Ref Range    Salicylate Level <0 (L) 15 - 30 mg/dL   Acetaminophen GH   Result Value Ref Range    Acetaminophen <0.2 0.0 - 30.0 ug/mL   Ethanol GH   Result Value Ref Range    Ethanol g/dL <0.01 <0.01 %   HCG qualitative urine   Result Value Ref Range    HCG Qual Urine Negative NEG^Negative   Drug of Abuse Screen Urine GH   Result  Value Ref Range    Amphetamine Qual Urine Presumptive positive-Unconfirmed result (A) NDET^Not Detected    Benzodiazepine Qual Urine Not Detected NDET^Not Detected    Cocaine Qual Urine Not Detected NDET^Not Detected    Methadone Qual Urine Not Detected NDET^Not Detected    PCP Qual Urine Not Detected NDET^Not Detected    Opiates Qualitative Urine Not Detected NDET^Not Detected    Oxycodone Qualitative Urine Not Detected NDET^Not Detected ng/mL    Propoxyphene Qualitative Urine Not Detected NDET^Not Detected ng/mL    Tricyclic Antidepressants Qual Urine Not Detected NDET^Not Detected ng/mL    Methamphetamine Qualitative Urine Not Detected NDET^Not Detected ng/mL    Barbiturates Qual Urine Not Detected NDET^Not Detected    Cannabinoids Qualitative Urine Presumptive positive-Unconfirmed result (A) NDET^Not Detected ng/mL    Buprenorphine Qualitative Urine Not Detected NDET^Not Detected ng/mL   XR Chest Port 1 View    Narrative    PROCEDURE:  XR CHEST PORT 1 VW    HISTORY:  sob.     COMPARISON:  None.    FINDINGS:   The cardiac silhouette is normal in size. The pulmonary vasculature is  normal.  The lungs are clear. No pleural effusion or pneumothorax.      Impression    IMPRESSION:  No acute cardiopulmonary disease.      LAKISHA KIM MD       Medications   0.9% sodium chloride BOLUS (0 mLs Intravenous Stopped 6/13/20 2325)   ondansetron (ZOFRAN) injection 4 mg (4 mg Intravenous Given 6/13/20 2103)   labetalol (NORMODYNE/TRANDATE) injection 10 mg (10 mg Intravenous Given 6/13/20 2108)   magnesium sulfate 2 g in water intermittent infusion (0 g Intravenous Stopped 6/13/20 2242)   potassium chloride ER (KLOR-CON M) CR tablet 40 mEq (40 mEq Oral Given 6/13/20 2140)       Assessments & Plan (with Medical Decision Making)     I have reviewed the nursing notes.    EKG was obtained. There is no evidence of acute ischemia. Troponin was negative.    Chest x-ray was performed. There is no evidence pneumonia or  pneumothorax.    D-dimer was negative. There is no evidence of pulmonary embolus.    HEART Score is 1.    Lab tests and X-rays were reviewed as above. Results were consistent with chest pain that is noncardiac and likely related to anxiety, hypomagnesemia, hypokalemia, depression with suicidal ideation.    Patient received Labetalol, Zofran.  She is given magnesium and potassium here in the emergency room to correct her low levels.  She has not had any significant chest pain in ED.    DEC Assessment obtained with Priscila who recommends discharge to home. She has contracted for safety and has a safety plan in place.  She is agreeable to outpatient follow-up.    Disposition Discharge to Home.    I had a discussion with the patient regarding the symptoms, exam, laboratory, EKG, X ray results, diagnosis, and plan.    I answered all questions to the best of my ability.  The patient is discharged home in good condition.  Follow-up as per DEC .  Ibuprofen and Tylenol for discomfort.  The patient voiced understanding of the plan, was agreeable, and had no further questions or concerns. Advised to return for any worsening symptoms.    New Prescriptions    No medications on file       Final diagnoses:   Chest pain, non-cardiac   Anxiety   Hypomagnesemia   Hypokalemia   Depression, unspecified depression type       6/13/2020   [facility]     Abdi Nelson MD  06/14/20 0014

## 2020-06-14 NOTE — ED TRIAGE NOTES
Pt reports ongoing suicidal thoughts. She has a plan, does not describe, however is not currently suicidal. Pt reports she is going to follow up with Nella at [facility] regarding this. Is open to talk to someone today. Emotional stress living with parents. She is planning on moving out within a couple of months hopefully.

## 2020-06-14 NOTE — ED TRIAGE NOTES
"Pt comes into the ER today reporting chest pain that came on suddenly around 1900 today when she was helping her parents clean up a paint job (outside). She was only around the painting for a couple of min. She describes the pain as tight. 8/10. She threw up once. This did not relive the pain.  Reports that she has had coffee, can of coke today. Has been stressing over a writing deadline, and staying up late, 0400, and waking up early. She has pain 1/10 now. Ate fries for dinner. Was short of breath during episode. No recent illness. \"tight around her heart\". Has been taking adderall for years, has not been abusing.   Pt is alert, anxious. History of panic attacks in past.  "

## 2020-06-14 NOTE — ED NOTES
Safety plan reviewed with pt and signed. Copy given to her and copy faxed to [facility] signed with RN

## 2020-06-15 LAB — INTERPRETATION ECG - MUSE: NORMAL

## 2020-06-24 ENCOUNTER — OFFICE VISIT (OUTPATIENT)
Dept: FAMILY MEDICINE | Facility: OTHER | Age: 30
End: 2020-06-24
Attending: PHYSICIAN ASSISTANT
Payer: COMMERCIAL

## 2020-06-24 ENCOUNTER — NURSE TRIAGE (OUTPATIENT)
Dept: FAMILY MEDICINE | Facility: OTHER | Age: 30
End: 2020-06-24

## 2020-06-24 VITALS
HEART RATE: 80 BPM | RESPIRATION RATE: 20 BRPM | WEIGHT: 179 LBS | BODY MASS INDEX: 27.22 KG/M2 | DIASTOLIC BLOOD PRESSURE: 82 MMHG | SYSTOLIC BLOOD PRESSURE: 130 MMHG | TEMPERATURE: 99 F

## 2020-06-24 DIAGNOSIS — Z23 NEED FOR DIPHTHERIA-TETANUS-PERTUSSIS (TDAP) VACCINE: ICD-10-CM

## 2020-06-24 DIAGNOSIS — T14.8XXA BITE BY ANIMAL: Primary | ICD-10-CM

## 2020-06-24 PROCEDURE — 99213 OFFICE O/P EST LOW 20 MIN: CPT | Performed by: PHYSICIAN ASSISTANT

## 2020-06-24 PROCEDURE — G0463 HOSPITAL OUTPT CLINIC VISIT: HCPCS | Mod: 25

## 2020-06-24 PROCEDURE — G0463 HOSPITAL OUTPT CLINIC VISIT: HCPCS

## 2020-06-24 PROCEDURE — 90471 IMMUNIZATION ADMIN: CPT

## 2020-06-24 PROCEDURE — 90715 TDAP VACCINE 7 YRS/> IM: CPT

## 2020-06-24 RX ORDER — NORETHINDRONE AND ETHINYL ESTRADIOL 1 MG-35MCG
KIT ORAL
COMMUNITY
Start: 2020-06-18 | End: 2020-10-01 | Stop reason: ALTCHOICE

## 2020-06-24 NOTE — PROGRESS NOTES
Nursing Notes:   Shannon Ng LPN  6/24/2020  2:41 PM  Signed  Chief Complaint   Patient presents with     Animal Bite     chipmunk         Medication Reconciliation: complete    Shannon Ng LPN      HPI:    Ysabel David is a 29 year old female who presents for skin concern.  Patient recently got bit by a baby treatment.  She has been taking care of a chipmunk over the last few days.  She states on 6/22/2020 the baby chipmunk bit her finger lately.  Right pinky finger.  No surrounding erythema, open wound, pus, drainage, warmth.  No fevers or chills.  Needs a repeat tetanus vaccine.  Otherwise feeling fine.    Past Medical History:   Diagnosis Date     Borderline personality disorder (H)     ?     Major depressive disorder, single episode     suicidal ideation, hospitalized x 3 in 2012     Personal history of other medical treatment (CODE)     Age 14       Past Surgical History:   Procedure Laterality Date     APPENDECTOMY OPEN      06/14/2017,Lap [place]       Family History   Problem Relation Age of Onset     Breast Cancer Maternal Grandmother         Cancer-breast     Other - See Comments Brother         Psychiatric illness,Severe depression     Colon Cancer No family hx of         Cancer-colon     Prostate Cancer No family hx of         Cancer-prostate     Ovarian Cancer No family hx of         Cancer-ovarian     Blood Disease No family hx of         Blood Disease     Asthma No family hx of         Asthma     Heart Disease No family hx of         Heart Disease     Diabetes No family hx of         Diabetes     Hypertension No family hx of         Hypertension     Seizure Disorder No family hx of         Seizures     Thyroid Disease No family hx of         Thyroid Disease       Social History     Tobacco Use     Smoking status: Never Smoker     Smokeless tobacco: Never Used   Substance Use Topics     Alcohol use: Yes     Comment: Alcoholic Drinks/day: socially       Current Outpatient  Medications   Medication Sig Dispense Refill     amphetamine-dextroamphetamine (ADDERALL XR) 30 MG per 24 hr capsule Take 30 mg by mouth every morning       amphetamine-dextroamphetamine (ADDERALL) 20 MG per tablet Take 1 tablet by mouth daily       NORTREL 1/35, 28, 1-35 MG-MCG tablet        QUEtiapine (SEROQUEL) 200 MG tablet Take 1 tablet by mouth At Bedtime       QUEtiapine (SEROQUEL) 50 MG tablet Take 50 mg by mouth At Bedtime  6       No Known Allergies    REVIEW OF SYSTEMS:  Refer to HPI.    EXAM:   Vitals:    /82 (BP Location: Right arm, Patient Position: Sitting, Cuff Size: Adult Large)   Pulse 80   Temp 99  F (37.2  C)   Resp 20   Wt 81.2 kg (179 lb)   BMI 27.22 kg/m      General Appearance: Pleasant, alert, appropriate appearance for age. No acute distress  Skin: Small bite saulo abrasion appreciated on the right distal fifth finger approximately 2 mm in length.  No surrounding erythema, pus, drainage, warmth.  Psychiatric Exam: Alert and oriented - appropriate affect.    PHQ Depression Screen  PHQ-9 SCORE 5/12/2016 3/3/2020   PHQ-9 Total Score 9 14       ASSESSMENT AND PLAN:      ICD-10-CM    1. Bite by animal  T14.8XXA GH IMM-  TDAP VACCINE (BOOSTRIX )   2. Need for diphtheria-tetanus-pertussis (Tdap) vaccine  Z23 GH IMM-  TDAP VACCINE (BOOSTRIX )         No infection concerns are appreciated this time.  No need to complete the rabies vaccine.  Patient was given a repeat Tdap vaccine.  Gave infection warning signs and symptoms.  Return as needed for recheck.    Patient Instructions   Gave tetanus vaccine.       Watch for any signs of increasing infection (redness, pus, increased pain (may be along the tendon and back of hand if the hand is involved), increased swelling or fever). Call if any of these signs occur. Monitor for fevers or chills.  Call or return to clinic as needed if your symptoms worsen or fail to improve as anticipated.         Anna Boogie PA-C PA-C..................6/24/2020  2:30 PM

## 2020-06-24 NOTE — PATIENT INSTRUCTIONS
Gave tetanus vaccine.       Watch for any signs of increasing infection (redness, pus, increased pain (may be along the tendon and back of hand if the hand is involved), increased swelling or fever). Call if any of these signs occur. Monitor for fevers or chills.  Call or return to clinic as needed if your symptoms worsen or fail to improve as anticipated.

## 2020-06-24 NOTE — TELEPHONE ENCOUNTER
S-(situation): Chipmunk bite    B-(background): Happened a few days ago. Patient last Td/Tdap: 3/5/2014    A-(assessment): Small break in the skin. No signs or symptoms of an infection at this time (No fever, or redness at the site). Only a little tender if she pokes at it. Bite was located on the pinky on the right hand.    R-(recommendations): Per protocol it is recommended to have an office visit today. It is recommended that she may need a booster for her tetanus shot as it has been >5 years with a break in the skin. Patient verbalized understanding and agreeable to come in to be seen. No appointments available in the clinic today so patient is agreeable to do a walk in appointment. Patient stated she had no further questions at this time. Urvashi Bell RN  ....................  6/24/2020   2:09 PM

## 2020-06-24 NOTE — NURSING NOTE
Chief Complaint   Patient presents with     Animal Bite     chipmunk         Medication Reconciliation: complete    Shannon Ng, LPN

## 2020-06-24 NOTE — TELEPHONE ENCOUNTER
Additional Information    Last tetanus shot > 5 years and any wound (e.g., cut, scrape)    Minor animal bite or claw wound, too small to irrigate    Negative: Puncture wound or small cut on face    Negative: Puncture wound or small cut on hands or genitals    Negative: Puncture wound and weak immune system (e.g., HIV positive, cancer chemo, splenectomy, organ transplant, chronic steroids)    Negative: Bite looks infected (e.g., red area, red streak, pus, or fever)    Negative: Puncture wound (hole through the skin) from cat (teeth or claws)    Negative: SEVERE pain (e.g., excruciating)    Negative: No bite saulo and suspected bat exposure (e.g., bat found in same room as sleeping adult)    Negative: No prior tetanus shots (or is not fully vaccinated) and any wound (e.g., cut, scrape)    Negative: Any break in skin from BITE (e.g., cut, puncture, or scratch) and WILD animal at RISK FOR RABIES (e.g., bat, raccoon, young, skunk, coyote, other carnivores)    Negative: Any break in skin from BITE (e.g., cut, puncture, or scratch) and PET animal (e.g., dog, cat, or ferret) at risk for RABIES (e.g., sick, stray, unprovoked bite, developing country)    Negative: Any break in skin from BITE (e.g., cut, puncture, or scratch) and monkey    Negative: Cut (length > 1/8 inch or 3 mm) or skin tear and any animal    Negative: Bleeding won't stop after 10 minutes of direct pressure (using correct technique)    Negative: Major bleeding (actively dripping or spurting) that can't be stopped    Negative: Sounds like a life-threatening emergency to the triager    Negative: EXPOSURE of non-intact skin (e.g., exposed person has dermatitis, abrasion, wound) with animal BODY FLUID (e.g., saliva such as licking, blood, brain) and animal at high-risk for RABIES (e.g., bat, raccoon, young, skunk, coyote, other carnivores)    Negative: Small puncture wound (e.g., from gerbil, mouse, hamster, puppy, turtle)    Negative: Superficial scratch (didn't  go through the dermis)    Negative: Bite that didn't break the skin    Protocols used: ANIMAL BITE-A-OH

## 2020-06-30 ENCOUNTER — TELEPHONE (OUTPATIENT)
Dept: FAMILY MEDICINE | Facility: OTHER | Age: 30
End: 2020-06-30

## 2020-06-30 DIAGNOSIS — G47.00 INSOMNIA, UNSPECIFIED TYPE: Primary | ICD-10-CM

## 2020-06-30 RX ORDER — QUETIAPINE FUMARATE 200 MG/1
200 TABLET, FILM COATED ORAL AT BEDTIME
Qty: 5 TABLET | Refills: 0 | Status: SHIPPED | OUTPATIENT
Start: 2020-06-30 | End: 2020-07-06

## 2020-06-30 NOTE — TELEPHONE ENCOUNTER
The patient was given an appointment on July 6 th.  Indiana Gutiérrez LPN..................6/30/2020   11:26 AM

## 2020-06-30 NOTE — TELEPHONE ENCOUNTER
Patient missed appointment today and is needing refill of her Seroquel for insomnia. Pharmacy [facility]. Did instruct that she needs to follow up with PCP for further refills.  Kendra Castaneda LPN ....................  6/30/2020   9:19 AM

## 2020-06-30 NOTE — TELEPHONE ENCOUNTER
Patient needs to get in to see [facility]  For med refill and was unable to get an appointment earlier than 7/15/2020, can she be able to use one of the same day spots, please review. Thank you

## 2020-07-06 ENCOUNTER — OFFICE VISIT (OUTPATIENT)
Dept: FAMILY MEDICINE | Facility: OTHER | Age: 30
End: 2020-07-06
Attending: FAMILY MEDICINE
Payer: COMMERCIAL

## 2020-07-06 VITALS
SYSTOLIC BLOOD PRESSURE: 120 MMHG | BODY MASS INDEX: 28.28 KG/M2 | WEIGHT: 186 LBS | TEMPERATURE: 98.7 F | DIASTOLIC BLOOD PRESSURE: 72 MMHG | HEART RATE: 116 BPM | RESPIRATION RATE: 16 BRPM | OXYGEN SATURATION: 98 %

## 2020-07-06 DIAGNOSIS — Z79.899 CONTROLLED SUBSTANCE AGREEMENT SIGNED: ICD-10-CM

## 2020-07-06 DIAGNOSIS — F33.2 MAJOR DEPRESSIVE DISORDER, RECURRENT SEVERE WITHOUT PSYCHOTIC FEATURES (H): Primary | ICD-10-CM

## 2020-07-06 DIAGNOSIS — F90.9 ATTENTION DEFICIT HYPERACTIVITY DISORDER (ADHD), UNSPECIFIED ADHD TYPE: ICD-10-CM

## 2020-07-06 DIAGNOSIS — Z63.8 STRESS DUE TO FAMILY TENSION: ICD-10-CM

## 2020-07-06 DIAGNOSIS — G47.00 INSOMNIA, UNSPECIFIED TYPE: ICD-10-CM

## 2020-07-06 PROCEDURE — G0463 HOSPITAL OUTPT CLINIC VISIT: HCPCS

## 2020-07-06 PROCEDURE — 99215 OFFICE O/P EST HI 40 MIN: CPT | Performed by: FAMILY MEDICINE

## 2020-07-06 PROCEDURE — 80307 DRUG TEST PRSMV CHEM ANLYZR: CPT | Mod: ZL | Performed by: FAMILY MEDICINE

## 2020-07-06 RX ORDER — DEXTROAMPHETAMINE SACCHARATE, AMPHETAMINE ASPARTATE, DEXTROAMPHETAMINE SULFATE AND AMPHETAMINE SULFATE 5; 5; 5; 5 MG/1; MG/1; MG/1; MG/1
20 TABLET ORAL 2 TIMES DAILY
Qty: 60 TABLET | Refills: 0 | Status: SHIPPED | OUTPATIENT
Start: 2020-08-06 | End: 2020-09-05

## 2020-07-06 RX ORDER — QUETIAPINE FUMARATE 200 MG/1
200 TABLET, FILM COATED ORAL AT BEDTIME
Qty: 30 TABLET | Refills: 11 | Status: SHIPPED | OUTPATIENT
Start: 2020-07-06 | End: 2021-11-09

## 2020-07-06 RX ORDER — DEXTROAMPHETAMINE SACCHARATE, AMPHETAMINE ASPARTATE, DEXTROAMPHETAMINE SULFATE AND AMPHETAMINE SULFATE 5; 5; 5; 5 MG/1; MG/1; MG/1; MG/1
20 TABLET ORAL 2 TIMES DAILY
Qty: 60 TABLET | Refills: 0 | Status: SHIPPED | OUTPATIENT
Start: 2020-07-06 | End: 2020-08-05

## 2020-07-06 RX ORDER — DEXTROAMPHETAMINE SACCHARATE, AMPHETAMINE ASPARTATE, DEXTROAMPHETAMINE SULFATE AND AMPHETAMINE SULFATE 5; 5; 5; 5 MG/1; MG/1; MG/1; MG/1
20 TABLET ORAL DAILY
Qty: 30 TABLET | Refills: 0 | Status: CANCELLED | OUTPATIENT
Start: 2020-07-06

## 2020-07-06 RX ORDER — DEXTROAMPHETAMINE SACCHARATE, AMPHETAMINE ASPARTATE, DEXTROAMPHETAMINE SULFATE AND AMPHETAMINE SULFATE 5; 5; 5; 5 MG/1; MG/1; MG/1; MG/1
20 TABLET ORAL 2 TIMES DAILY
Qty: 60 TABLET | Refills: 0 | Status: SHIPPED | OUTPATIENT
Start: 2020-09-06 | End: 2020-10-06

## 2020-07-06 RX ORDER — QUETIAPINE FUMARATE 50 MG/1
50 TABLET, FILM COATED ORAL AT BEDTIME
Qty: 30 TABLET | Refills: 11 | Status: SHIPPED | OUTPATIENT
Start: 2020-07-06 | End: 2021-11-09

## 2020-07-06 SDOH — SOCIAL STABILITY - SOCIAL INSECURITY: OTHER SPECIFIED PROBLEMS RELATED TO PRIMARY SUPPORT GROUP: Z63.8

## 2020-07-06 ASSESSMENT — PAIN SCALES - GENERAL: PAINLEVEL: NO PAIN (0)

## 2020-07-06 ASSESSMENT — PATIENT HEALTH QUESTIONNAIRE - PHQ9: SUM OF ALL RESPONSES TO PHQ QUESTIONS 1-9: 24

## 2020-07-06 NOTE — NURSING NOTE
"Patient presents to the clinic today for a medication check.  Ayesha Moser LPN 7/6/2020   2:13 PM    Chief Complaint   Patient presents with     Recheck Medication       Initial /72 (BP Location: Right arm, Patient Position: Sitting, Cuff Size: Adult Regular)   Pulse 116   Temp 98.7  F (37.1  C) (Tympanic)   Resp 16   Wt 84.4 kg (186 lb)   LMP 06/15/2020 (Within Days)   SpO2 98%   Breastfeeding No   BMI 28.28 kg/m   Estimated body mass index is 28.28 kg/m  as calculated from the following:    Height as of 6/13/20: 1.727 m (5' 8\").    Weight as of this encounter: 84.4 kg (186 lb).  Medication Reconciliation: complete  Ayesha Moser LPN    "

## 2020-07-06 NOTE — PROGRESS NOTES
"Nursing Notes:   Ayesha Moser LPN  7/6/2020  2:13 PM  Signed  Patient presents to the clinic today for a medication check.  Ayesha Moser LPN 7/6/2020   2:13 PM    Chief Complaint   Patient presents with     Recheck Medication       Initial /72 (BP Location: Right arm, Patient Position: Sitting, Cuff Size: Adult Regular)   Pulse 116   Temp 98.7  F (37.1  C) (Tympanic)   Resp 16   Wt 84.4 kg (186 lb)   LMP 06/15/2020 (Within Days)   SpO2 98%   Breastfeeding No   BMI 28.28 kg/m   Estimated body mass index is 28.28 kg/m  as calculated from the following:    Height as of 6/13/20: 1.727 m (5' 8\").    Weight as of this encounter: 84.4 kg (186 lb).  Medication Reconciliation: complete  Ayesha Moser LPN      SUBJECTIVE:  Ysabel David  is a 29 year old female who comes in today for follow-up and renewal of medications.  I last saw her in March of this year.  We treated her for tinea versicolor.  She was seen a few weeks ago when she was bit by a chipmunk.  She got a tetanus booster but did not need rabies vaccine.  She was seen a couple weeks before that in the emergency room with chest pain.  She has been living with her parents and that makes for difficult situation as they really do not like her boyfriend.  She was having increasing depression and anxiety issues but was not actively suicidal when she was seen. Evaluation was negative in the emergency room with regard to heart or lung issues.  Labs were negative as was chest x-ray and EKG. she was seen for DEC assessment and a safety plan was devised and was in place and she was discharged home.    She had been seeing Dr. Elder and was on Adderall.  indicates she last filled those in April. She had been on Seroquel in the past as well.  She got 5 tablets filled on June 30.    She moved out about a week or so ago.  She has been staying with friends. She is in the process of moving to the [facility] next Monday and stay with her " boyfriend.  She has been with him for 9 months.    PHQ 5/12/2016 3/3/2020 7/6/2020   PHQ-9 Total Score 9 14 24   Q9: Thoughts of better off dead/self-harm past 2 weeks Several days Several days Nearly every day     No flowsheet data found.      She is working with a new therapist about setting up boundaries. She is working with Nella from [facility].  She is wanting to transition out of [facility].  She will still be coming back here.     Past Medical, Family, and Social History reviewed and updated as noted below.   ROS is negative except as noted above       No Known Allergies,   Family History   Problem Relation Age of Onset     Breast Cancer Maternal Grandmother         Cancer-breast     Other - See Comments Brother         Psychiatric illness,Severe depression     Colon Cancer No family hx of         Cancer-colon     Prostate Cancer No family hx of         Cancer-prostate     Ovarian Cancer No family hx of         Cancer-ovarian     Blood Disease No family hx of         Blood Disease     Asthma No family hx of         Asthma     Heart Disease No family hx of         Heart Disease     Diabetes No family hx of         Diabetes     Hypertension No family hx of         Hypertension     Seizure Disorder No family hx of         Seizures     Thyroid Disease No family hx of         Thyroid Disease   ,   Current Outpatient Medications   Medication     amphetamine-dextroamphetamine (ADDERALL) 20 MG per tablet     amphetamine-dextroamphetamine (ADDERALL) 20 MG tablet     [START ON 8/6/2020] amphetamine-dextroamphetamine (ADDERALL) 20 MG tablet     [START ON 9/6/2020] amphetamine-dextroamphetamine (ADDERALL) 20 MG tablet     NORTREL 1/35, 28, 1-35 MG-MCG tablet     QUEtiapine (SEROQUEL) 200 MG tablet     QUEtiapine (SEROQUEL) 50 MG tablet     No current facility-administered medications for this visit.    ,   Past Medical History:   Diagnosis Date     Borderline personality disorder (H)     ?     Major  depressive disorder, single episode     suicidal ideation, hospitalized x 3 in 2012     Personal history of other medical treatment (CODE)     Age 14   ,   Patient Active Problem List    Diagnosis Date Noted     Attention deficit hyperactivity disorder (ADHD), unspecified ADHD type 07/06/2020     Priority: Medium     Controlled substance agreement signed 07/06/2020     Priority: Medium     Major depressive disorder, recurrent severe without psychotic features (H) 07/06/2020     Priority: Medium     Stress due to family tension 07/06/2020     Priority: Medium     Postoperative state 06/21/2017     Priority: Medium     Headache 03/01/2011     Priority: Medium     Insomnia 03/01/2011     Priority: Medium     Other syndromes affecting cervical region 03/01/2011     Priority: Medium   ,   Past Surgical History:   Procedure Laterality Date     APPENDECTOMY OPEN      06/14/2017,Lap Appy    and   Social History     Tobacco Use     Smoking status: Never Smoker     Smokeless tobacco: Never Used   Substance Use Topics     Alcohol use: Yes     Comment: Alcoholic Drinks/day: socially     OBJECTIVE:  /72 (BP Location: Right arm, Patient Position: Sitting, Cuff Size: Adult Regular)   Pulse 116   Temp 98.7  F (37.1  C) (Tympanic)   Resp 16   Wt 84.4 kg (186 lb)   LMP 06/15/2020 (Within Days)   SpO2 98%   Breastfeeding No   BMI 28.28 kg/m     EXAM:  Alert and cooperative, no distress.  She has no formal thought disorder.  No active homicidal or suicidal ideations.  ASSESSMENT/Plan :    Ysabel was seen today for recheck medication.    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)    Insomnia, unspecified type  -     QUEtiapine (SEROQUEL) 50 MG tablet; Take 1 tablet (50 mg) by mouth At Bedtime  -     QUEtiapine (SEROQUEL) 200 MG tablet; Take 1 tablet (200 mg) by mouth At Bedtime    Attention deficit hyperactivity disorder (ADHD), unspecified ADHD  type  -     amphetamine-dextroamphetamine (ADDERALL) 20 MG tablet; Take 1 tablet (20 mg) by mouth 2 times daily  -     amphetamine-dextroamphetamine (ADDERALL) 20 MG tablet; Take 1 tablet (20 mg) by mouth 2 times daily  -     amphetamine-dextroamphetamine (ADDERALL) 20 MG tablet; Take 1 tablet (20 mg) by mouth 2 times daily  -     Drug  Screen Comprehensive , Urine with Reported Meds (MedTox) (Pain Care Package)    Controlled substance agreement signed  -     Drug  Screen Comprehensive , Urine with Reported Meds (MedTox) (Pain Care Package)    Stress due to family tension      Lengthy discussion with regard to her multiple psychosocial stressors in her current situation.  Should improve if she changes her living situation.  She is connected to her therapist and seems to be doing well in that regard.  She like to keep her medications the same and at this point we elected not to change anything until her living situation levels out.  She agrees to safety plan.    Discussed adult ADHD and told her I would be willing to take over her medications and explained the [facility] protocol.  She will do [employer] today and her medication contract is signed.  3 months of Adderall 20 mg twice daily were prescribed.  We will continue with Seroquel 200 mg at bedtime but on nights that she might need to get up earlier, she takes 50 mg so that was prescribed as well.  Discussed potentially reconnecting with a psychiatrist at some point.    Will notify results of lab when available.  She will be living at:  [address] [place] [id number]    A total of 40 minutes was spent with the patient, greater than 50% of the time was spent in counseling/discussion of the aforementioned concerns.     Zay Carlos MD

## 2020-07-09 LAB — PAIN DRUG SCR UR W RPTD MEDS: NORMAL

## 2021-04-05 ENCOUNTER — OFFICE VISIT (OUTPATIENT)
Dept: FAMILY MEDICINE | Facility: CLINIC | Age: 31
End: 2021-04-05

## 2021-04-05 VITALS
WEIGHT: 194.6 LBS | SYSTOLIC BLOOD PRESSURE: 135 MMHG | OXYGEN SATURATION: 99 % | TEMPERATURE: 98.3 F | HEIGHT: 68 IN | RESPIRATION RATE: 18 BRPM | BODY MASS INDEX: 29.49 KG/M2 | DIASTOLIC BLOOD PRESSURE: 87 MMHG | HEART RATE: 99 BPM

## 2021-04-05 DIAGNOSIS — R05.9 COUGH: Primary | ICD-10-CM

## 2021-04-05 DIAGNOSIS — G47.00 INSOMNIA, UNSPECIFIED TYPE: ICD-10-CM

## 2021-04-05 RX ORDER — ALBUTEROL SULFATE 90 UG/1
2 AEROSOL, METERED RESPIRATORY (INHALATION) EVERY 4 HOURS PRN
Qty: 8 G | Refills: 0 | Status: SHIPPED | OUTPATIENT
Start: 2021-04-05 | End: 2021-11-09

## 2021-04-05 RX ORDER — QUETIAPINE FUMARATE 50 MG/1
50 TABLET, FILM COATED ORAL AT BEDTIME
Qty: 30 TABLET | Refills: 0 | Status: SHIPPED | OUTPATIENT
Start: 2021-04-05 | End: 2021-11-09

## 2021-04-05 RX ORDER — QUETIAPINE FUMARATE 200 MG/1
200 TABLET, FILM COATED ORAL AT BEDTIME
Qty: 30 TABLET | Refills: 0 | Status: SHIPPED | OUTPATIENT
Start: 2021-04-05 | End: 2021-11-09

## 2021-04-05 RX ORDER — GUAIFENESIN 200 MG/10ML
200 LIQUID ORAL EVERY 4 HOURS PRN
Qty: 118 ML | Refills: 0 | Status: SHIPPED | OUTPATIENT
Start: 2021-04-05 | End: 2021-05-05

## 2021-04-05 ASSESSMENT — MIFFLIN-ST. JEOR: SCORE: 1661.2

## 2021-04-05 ASSESSMENT — PATIENT HEALTH QUESTIONNAIRE - PHQ9: SUM OF ALL RESPONSES TO PHQ QUESTIONS 1-9: 18

## 2021-04-05 NOTE — Clinical Note
Tomer Del Toro,    I have completed my note. Please review, add tie in statement and close encounter.     Thanks!     Maggie William  [email or web address]

## 2021-04-06 NOTE — PROGRESS NOTES
MEDICINE NOTE    SUBJECTIVE: Ysabel is a 31yo female who presents with 3 week history of progressive cough, negative COVID-19 test received one week ago. She states that the cough started in the first week with swollen throat and lymph nodes with mucus and body aches but then progressed to cough and shortness of breath the following week. She describes the shortness of breath as feeling as though her lungs are being squeezed and her air is getting cut off. The symptoms are worse at night starting around 8:00PM as she tends to cough more frequently and harder, otherwise her symptoms are generally better in the morning and afternoon. She feels anxious at night after onset and without cessation of symptoms. Cough does not wake her up from sleep. She does report that every morning she will cough up phlegm of multiple colors (green, yellow, red, orange). She additionally experiences wheezing but no pain. Her cough and shortness of breath is not worse with taking stairs, walking outside, or laying supine. She does report that laying on her stomach sometimes provides relief but not all the time. She has also tried tea and cough drops at night without significant relief. She feels that her symptoms are better when not wearing a mask and better with rest. She has also been taking her temperature every morning without much of a change in readings. No fevers. Patient reports to have traveled up north to [facility] 1-2 months ago, otherwise no other known exposures or significant travel. She does not know anyone at work or at home (her partner) that are experiencing similar symptoms. She has never worked with the homeless population and has never been incarcerated.    Additionally, she has history depression and insomnia. For her depression, she reports no changes recently to her mental health. She feels safe at home with safety contract with partner who also experiences depression. She is currently not seeing  anybody for depression nor is she on any medications for her depression. In regards to her insomnia, it is well controlled with Seroquel but does note that she has been using her medications sparingly as she currently does not have insurance. She lost her insurance about 2 months ago which has been causing some stress in her life.    REVIEW OF SYSTEMS:  Gen: no fevers, chills, night sweats or weight change  Eyes: no vision change  Cardiac: no chest pain, endorses palpitations  Lungs: endorses cough and shortness of breath per HPI  GI: no bowel changes  : no urinary changes  Musculoskeletal: no joint or muscle pain  Skin: endorses rash over chest/neck at baseline  Allergy: no environmental or drug allergies  Psych: endorses depression, insomnia, and anxiety per HPI    Past Medical History:   Diagnosis Date     Borderline personality disorder (H)     ?     Major depressive disorder, single episode     suicidal ideation, hospitalized x 3 in 2012     Personal history of other medical treatment (CODE)     Age 14     Past Surgical History:   Procedure Laterality Date     APPENDECTOMY OPEN      06/14/2017,Lap Appy     Family History   Problem Relation Age of Onset     Breast Cancer Maternal Grandmother         Cancer-breast     Other - See Comments Brother         Psychiatric illness,Severe depression     Colon Cancer No family hx of         Cancer-colon     Prostate Cancer No family hx of         Cancer-prostate     Ovarian Cancer No family hx of         Cancer-ovarian     Blood Disease No family hx of         Blood Disease     Asthma No family hx of         Asthma     Heart Disease No family hx of         Heart Disease     Diabetes No family hx of         Diabetes     Hypertension No family hx of         Hypertension     Seizure Disorder No family hx of         Seizures     Thyroid Disease No family hx of         Thyroid Disease     Social History     Socioeconomic History     Marital status: Single     Spouse name:  "Not on file     Number of children: Not on file     Years of education: Not on file     Highest education level: Not on file   Occupational History     Occupation: STUDENT   Social Needs     Financial resource strain: Not on file     Food insecurity     Worry: Not on file     Inability: Not on file     Transportation needs     Medical: Not on file     Non-medical: Not on file   Tobacco Use     Smoking status: Never Smoker     Smokeless tobacco: Never Used   Substance and Sexual Activity     Alcohol use: Yes     Comment: Alcoholic Drinks/day: socially     Drug use: Yes     Types: Marijuana     Comment: Drug use: Yes     Sexual activity: Yes     Partners: Male     Birth control/protection: Pill   Lifestyle     Physical activity     Days per week: Not on file     Minutes per session: Not on file     Stress: Not on file   Relationships     Social connections     Talks on phone: Not on file     Gets together: Not on file     Attends Confucianist service: Not on file     Active member of club or organization: Not on file     Attends meetings of clubs or organizations: Not on file     Relationship status: Not on file     Intimate partner violence     Fear of current or ex partner: Not on file     Emotionally abused: Not on file     Physically abused: Not on file     Forced sexual activity: Not on file   Other Topics Concern     Not on file   Social History Narrative    She was home schooled.  Was involved in drama at the high school.        2020: She is doing a lot of theater. She wrote a play and it will be at the [facility] in September. She is working between [place] and here. She is working doing massage therapy and nannying.        4/5/21: Pt used [facility] to find affordable health care options and get more information about health insurance- [facility]      OBJECTIVE:  Physical Exam:  /87 (BP Location: Left arm)   Pulse 99   Temp 98.3  F (36.8  C) (Oral)   Resp 18   Ht 1.727 m (5' 8\")   Wt 88.3 kg (194 lb 9.6 oz)   SpO2 99%   BMI " 29.59 kg/m    Constitutional: no distress, comfortable, pleasant   Ears, Nose and Throat: nose clear and free of lesions, throat clear, neck supple, no adenopathy  Cardiovascular: regular rate and rhythm, normal S1 and S2, no murmurs  Respiratory: clear to auscultation, no wheezes or crackles, normal breath sounds   Gastrointestinal: positive bowel sounds, nontender, no hepatosplenomegaly, no masses     ASSESSMENT/PLAN:  Ysabel was seen today for cough.    Diagnoses and all orders for this visit:    Cough  -     albuterol (PROAIR HFA/PROVENTIL HFA/VENTOLIN HFA) 108 (90 Base) MCG/ACT inhaler; Inhale 2 puffs into the lungs every 4 hours as needed for shortness of breath / dyspnea or wheezing  -     guaiFENesin (ROBITUSSIN) 100 MG/5ML liquid; Take 10 mLs (200 mg) by mouth every 4 hours as needed for cough    Insomnia, unspecified type  -     QUEtiapine (SEROQUEL) 50 MG tablet; Take 1 tablet (50 mg) by mouth At Bedtime  -     QUEtiapine (SEROQUEL) 200 MG tablet; Take 1 tablet (200 mg) by mouth At Bedtime    -W provided resources to establish insurance and mental health care  -Prescribed Robitussin PRN for mucus management  -Prescribed Albuterol inhaler PRN for chest tightness/coughing  -Provided refill prescription and coupon for Seroquel for insomnia  -Recommended to try honey to help improve the cough  -Recommended to check blood pressure a few times and record readings for follow up visit review  -RTC in 1-2 weeks if symptoms do not improve to consider other options    Differential diagnosis includes heartburn, allergies, post-viral cough. Less likely pneumonia given she is not hypoxic with normal lung exam. TB even less likely given no common exposures. Depression/anxiety could be also be contributory to symptoms.      Med Clinician: Jonnathan Rivera, MS2  Preceptor: Ramiro Lora MD    I am signing this for the preceptor who has been unable to sign this note in a timely manner.  The content of this note has been  reviewed by the preceptor for accuracy.  I did not participate in the care of this patient.  Shravan Dias MD - Medical Director, [facility]

## 2021-04-06 NOTE — PATIENT INSTRUCTIONS
Patient Visit Summary    Patient Name: Ysabel David  MRN: [id number]    Date of Visit: 4/5/2021    Principle Diagnosis: Cough    Physician's Recommendations/Instructions: Today we saw you for your cough, which has been worsening over the past few weeks. We have ordered Robitussin, which is a cough expectorant, meaning that it should help make it easier to clear the mucous from your respiratory tract. We have also ordered an Albuterol inhaler that you can use as needed for chest tightness/coughing. You may also try honey to help improve the cough. We also gave you prescriptions/coupons to fill your Seroquel for insomnia.     Lab Tests Performed: N/A    Follow Up/Results: We recommend you take your blood pressure a few times and record the readings for us to look at next time you come in. We recommend you return to clinic in 1-2 weeks if your symptoms do not improve, so we can consider other treatment options.    Referrals and Instructions: N/A    Physician: Ramiro oLra MD

## 2021-04-06 NOTE — PROGRESS NOTES
"/87 (BP Location: Left arm)   Pulse 99   Temp 98.3  F (36.8  C) (Oral)   Resp 18   Ht 1.727 m (5' 8\")   Wt 88.3 kg (194 lb 9.6 oz)   SpO2 99%   BMI 29.59 kg/m      Patient presented with cough lasting 3 weeks with a negative COVID test.  Slightly hypertensive otherwise VS WDL. PHQ-9 was 18 and patient reports history of depression. Resources for depression provided.    Lion Hogue, SN Shiloh Farr DNP    "

## 2021-04-07 NOTE — PROGRESS NOTES
"[facility] Progress Note    Chief complaint: cough and dyspnea  Last date of full med: N/A    Subjective:  Ysabel is a 30-year-old female presenting to the clinic with complaints of a persistent cough and difficulty breathing. Symptoms began 3 weeks ago and initially included a swollen throat, some mucus and sneezing, and feeling like her lungs were \"being squeezed.\" This lasted about a week and tapered off into cold-like symptoms and some shortness of breath. She got a COVID test around this point, which was negative. For the past couple of weeks, her symptoms have gotten worse. She experiences some coughing and the squeezed lung feeling during the day which has been tolerable, but every night around 8pm her symptoms progress into wheezing, more forceful coughing, and difficulty breathing. Ysabel could not identify anything in her routine around 8pm that might be causing this. Each morning she coughs up phlegm that is yellow/green with some orange/red in it. The red color does not appear to be blood. She has tried drinking tea and taking cough drops, which both provided minimal relief.    Ysabel struggles with insomnia, which runs in her family. She has been taking quetiapine for the last 10 years, and denies side effects including headache and dizziness. It has been working well to control her insomnia, but about two months ago she lost her insurance and has been rationing her medications since. She typically takes 200 mg tablets when she doesn't have to be anywhere the following morning, and 50 mg tablets when she has to be at work. She currently only has 200 mg tablets remaining and is cutting these in half. Ysabel also deals with depression which is common in her family. She is not currently taking anything for it. It is not too bad currently, and she feels supported by her boyfriend who she lives with. She was previously seeing a therapist, but has not been able to since losing insurance.     She also has " "ADD, which she takes Adderall 20 mg for. This was taken every day, but she now takes it about once a week as needed, typically at night to help her write. When asked directly, she said she experiences a decreased appetite and dry mouth when taking, but these are not bothersome.    For contraception, Ysabel had Nexplanon implanted in October 2020. She has no complaints at this point. She is not currently taking any other medications.    Significant hospitalizations include a mental health incident when she was 21. She had her appendix removed about 3 years ago. Notable family history besides insomnia and depression includes possible diabetes on her father's side, and both grandmothers passed away due to breast cancer. Ysabel consumes 2-3 glasses of wine every night, 1-2 cups of coffee every day, and denies illicit drug use. She also denies tobacco use, outside of smoking tobacco out of a pipe once a year with a friend. Immunizations are up-to-date, although she did not receive a flu shot this past season and typically does not get one.       Objective:  /87 (BP Location: Left arm)   Pulse 99   Temp 98.3  F (36.8  C) (Oral)   Resp 18   Ht 1.727 m (5' 8\")   Wt 88.3 kg (194 lb 9.6 oz)   SpO2 99%   BMI 29.59 kg/m        Assessment:     Cough/Dyspnea: uncontrolled  DTP: indication - needs additional drug therapy  Rationale: Ysabel is not at goal of managing symptoms of cough and dyspnea, and these symptoms have progressed over the past three weeks. Drinking tea and using cough drops has been unsuccessful, and no other remedies or medications have been tried. An expectorant to promote her productive cough is indicated. Therapeutic options include guaifenesin. Other remedies including eating honey and drinking enough water may provide relief as well. For dyspnea, therapeutic options include an albuterol or levalbuterol inhaler.    Insomnia: uncontrolled  DTP: convenience - adherence  Rationale: Ysabel has been " rationing quetiapine since becoming uninsured 2 months ago. She has been taking this medication for 10 years and it has been working well for her. She prefers to have both the 50 mg and 200 mg strengths so that she can take less when she has work the next morning and more when she does not. Quetiapine is not stocked in the clinic's dispensing pharmacy, but since she has been stable it would not be ideal to switch this medication for something available in the clinic. Ysabel was open to filling the prescriptions at her [facility] pharmacy if the cost could be brought down.    Depression: somewhat controlled  DTP: none at this time  Rationale: Ysabel's depression has been manageable lately as she has a strong support system at home with her boyfriend. She would benefit from resources for depression, including how to potentially seek therapy while still uninsured. While there is no DTP at this time, depression should be reassessed at future visits and an antidepressant may be initiated at a later date.    ADD: uncontrolled  DTP: convenience - adherence  Rationale: Adderall has helped Ysabel focus when needed, although she would like to return to taking it daily when she can afford it. She is hopeful that getting insurance again will allow her to do this soon. She met with a CHW during this visit, who helped her navigate the process for applying for insurance.    Contraception: controlled  DTP: none at this time  Rationale: Ysabel reported no complaints of the Nexplanon implant, and it has successfully prevented pregnancy thus far.    General Health: uncontrolled  DTP: none at this time  Rationale: Ysabel has a BMI of 29.59 kg/m , classifying her as overweight and borderline obese. Lifestyle modifications including a healthy diet and regular physical activity should be considered in order to lose weight. Blood pressure was slightly elevated at this visit. More data is needed in order to make a diagnosis of hypertension, so  this should be assessed at future visits.    Plan:  1. Initiate guaifenesin 100 mg/5 mL solution. Take 10 mL by mouth every 4 hours as needed for cough. Educated patient on side effects including nausea and vomiting. Advised patient to watch for changes in cough productivity and frequency. Educated patient on nonpharmacologic options including eating honey and drinking enough water.  2. Initiate albuterol 90 mcg/actuation inhaler. Inhale 2 puffs by mouth every 4 hours as needed for shortness of breath/dyspnea. Educated patient on side effects including nausea, headache, and shakiness. Advised patient to watch for improvements in breathing and frequency of dyspnea episodes.  3. Continue quetiapine 200 mg and 50 mg tablets. Patient was given prescriptions of both of these to fill at [facility], in addition to GoodRx coupons to help her afford the medications.  4. Patient was given resources for depression by nursing. Reassess status of depression and interest in initiation of an antidepressant or seeing a mental health specialist at future visits.  5. Patient was given resources for insurance by a CHW.  6. Educated patient on lifestyle changes to promote weight loss. Emphasized the importance of a healthy diet focused on vegetables, whole grains, fruits, and lean meats. Also discussed benefits of regular physical activity.  7. Encouraged patient to collect blood pressure readings at a pharmacy and bring these values with her to next clinic visit.     Follow-Up:  Phone call in 3 days (4/8/21) to assess efficacy and safety of both guaifenesin and albuterol. Ask how often each medication has been used in the past 3 days.   Guaifenesin efficacy: improvements in cough frequency and productivity   Guaifenesin safety: side effects of nausea and vomiting   Albuterol efficacy: improvements in ability to breathe and frequency of dyspnea   Albuterol safety: side effects of nausea, headache, shakiness    Clinic visit in 1-2 weeks to  reassess cough and dyspnea symptoms, and efficacy of guaifenesin and albuterol.    Pharmacy Clinician: Maggie William  Pharmacy Preceptor: Katey Avina    _____________________________  Preceptor Use Only:  In supervising the student, I have reviewed and verified the student's documentation and found it to be correct and complete.   Preceptor Signature: Katey Avina PharmD

## 2021-07-21 DIAGNOSIS — G47.00 INSOMNIA, UNSPECIFIED TYPE: Primary | ICD-10-CM

## 2021-07-21 NOTE — LETTER
July 22, 2021      Ysabel David  [address]  [address]  [place] [address]        Dear Ysabel,           This letter is to remind you that you are due for your annual exam with Zay Carlos. Your last comprehensive visit was more than 12 months ago.    A refill request for Quetiapine has been sent to your provider for review and consideration. Additional refills require you to complete this appointment.    Please call the clinic at [phone] to schedule your appointment.    If you should require additional refills before your scheduled appointment, please contact your pharmacy and we will refill your medication until the date of your appointment.    If you are no longer seeing Zay Carlos for primary care, please call to let us know.       Thank you for choosing [facility] and [facility] for your health care needs.    Sincerely,    Refill RN  [facility]

## 2021-07-22 RX ORDER — QUETIAPINE FUMARATE 200 MG/1
TABLET, FILM COATED ORAL
Qty: 30 TABLET | Refills: 0 | Status: SHIPPED | OUTPATIENT
Start: 2021-07-22 | End: 2021-08-09

## 2021-07-22 NOTE — TELEPHONE ENCOUNTER
" Disp Refills Start End MOISES   QUEtiapine (SEROQUEL) 200 MG tablet 30 tablet 0 4/5/2021 5/5/2021 --   Sig - Route: Take 1 tablet (200 mg) by mouth At Bedtime - Oral   Class: Local Print       LOV: 7/9/2020  Future Office visit: No future appointment scheduled at this time.  Patient is due for an appointment. Appointment letter sent to patient.     Routing refill request to provider for review/approval because:  Failed protocol    Requested Prescriptions   Pending Prescriptions Disp Refills     QUEtiapine (SEROQUEL) 200 MG tablet [Pharmacy Med Name: QUETIAPINE FUMARATE 200 MG TAB] 30 tablet 8     Sig: TAKE 1 TABLET BY MOUTH EVERY DAY AT BEDTIME       Antipsychotic Medications Failed - 7/21/2021  9:34 AM        Failed - Lipid panel on file within the past 12 months     No lab results found.            Failed - CBC on file in past 12 months     Recent Labs   Lab Test 06/13/20 2019 06/13/17  2142   WBC 7.0  --    GICHWBC  --  18.4*   RBC 4.46  --    GICHRBC  --  4.81   HGB 14.1 15.9   HCT 41.8 44.0    227                 Failed - A1c or Glucose on file in past 12 months     Recent Labs   Lab Test 06/13/20  2019   *       Please review patients last 3 weights. If a weight gain of >10 lbs exists, you may refill the prescription once after instructing the patient to schedule an appointment within the next 30 days.    Wt Readings from Last 3 Encounters:   04/05/21 88.3 kg (194 lb 9.6 oz)   07/06/20 84.4 kg (186 lb)   06/24/20 81.2 kg (179 lb)             Failed - Recent (6 mo) or future (30 days) visit within the authorizing provider's specialty     Patient had office visit in the last 6 months or has a visit in the next 30 days with authorizing provider or within the authorizing provider's specialty.  See \"Patient Info\" tab in inbasket, or \"Choose Columns\" in Meds & Orders section of the refill encounter.            Passed - Blood pressure under 140/90 in past 12 months     BP Readings from Last 3 Encounters: "   04/05/21 135/87   07/06/20 120/72   06/24/20 130/82                 Passed - Patient is 12 years of age or older        Passed - Heart Rate on file within past 12 months     Pulse Readings from Last 3 Encounters:   04/05/21 99   07/06/20 116   06/24/20 80               Passed - Medication is active on med list        Passed - Patient is not pregnant        Passed - No positve pregnancy test on file in past 12 months         Unable to complete prescription refill per RN Medication Refill Policy.................... Urvashi Bell RN ....................  7/22/2021   11:59 AM

## 2021-08-05 DIAGNOSIS — G47.00 INSOMNIA, UNSPECIFIED TYPE: ICD-10-CM

## 2021-08-09 RX ORDER — QUETIAPINE FUMARATE 200 MG/1
TABLET, FILM COATED ORAL
Qty: 30 TABLET | Refills: 0 | Status: SHIPPED | OUTPATIENT
Start: 2021-08-09 | End: 2021-11-09

## 2021-08-09 NOTE — TELEPHONE ENCOUNTER
" Disp Refills Start End MOISES   QUEtiapine (SEROQUEL) 200 MG tablet 30 tablet 0 7/22/2021  No   Sig: TAKE 1 TABLET BY MOUTH EVERY DAY AT BEDTIME       LOV: 7/6/2020  Future Office visit: No future appointment scheduled at this time. Unable to reach patient via phone.      Routing refill request to provider for review/approval because:  Failed protocol    Requested Prescriptions   Pending Prescriptions Disp Refills     QUEtiapine (SEROQUEL) 200 MG tablet [Pharmacy Med Name: QUETIAPINE FUMARATE 200 MG TAB] 90 tablet 1     Sig: TAKE 1 TABLET BY MOUTH EVERYDAY AT BEDTIME       Antipsychotic Medications Failed - 8/5/2021 12:13 PM        Failed - Lipid panel on file within the past 12 months     No lab results found.            Failed - CBC on file in past 12 months     Recent Labs   Lab Test 06/13/20 2019 06/13/17  2142   WBC 7.0  --    GICHWBC  --  18.4*   RBC 4.46  --    GICHRBC  --  4.81   HGB 14.1 15.9   HCT 41.8 44.0    227                 Failed - A1c or Glucose on file in past 12 months     Recent Labs   Lab Test 06/13/20  2019   *       Please review patients last 3 weights. If a weight gain of >10 lbs exists, you may refill the prescription once after instructing the patient to schedule an appointment within the next 30 days.    Wt Readings from Last 3 Encounters:   04/05/21 88.3 kg (194 lb 9.6 oz)   07/06/20 84.4 kg (186 lb)   06/24/20 81.2 kg (179 lb)             Failed - Recent (6 mo) or future (30 days) visit within the authorizing provider's specialty     Patient had office visit in the last 6 months or has a visit in the next 30 days with authorizing provider or within the authorizing provider's specialty.  See \"Patient Info\" tab in inbasket, or \"Choose Columns\" in Meds & Orders section of the refill encounter.            Passed - Blood pressure under 140/90 in past 12 months     BP Readings from Last 3 Encounters:   04/05/21 135/87   07/06/20 120/72   06/24/20 130/82                 Passed - " Patient is 12 years of age or older        Passed - Heart Rate on file within past 12 months     Pulse Readings from Last 3 Encounters:   04/05/21 99   07/06/20 116   06/24/20 80               Passed - Medication is active on med list        Passed - Patient is not pregnant        Passed - No positve pregnancy test on file in past 12 months         Unable to complete prescription refill per RN Medication Refill Policy.................... Urvashi Bell RN ....................  8/9/2021   10:09 AM

## 2021-08-11 DIAGNOSIS — G47.00 INSOMNIA, UNSPECIFIED TYPE: Primary | ICD-10-CM

## 2021-08-12 NOTE — TELEPHONE ENCOUNTER
"   Disp Refills Start End MOISES   QUEtiapine (SEROQUEL) 50 MG tablet 30 tablet 0 4/5/2021 5/5/2021 --   Sig - Route: Take 1 tablet (50 mg) by mouth At Bedtime - Oral       LOV: 7/6/2020  Future Office visit: No future appointment scheduled at this time.    Routing refill request to provider for review/approval because:  Failed protocol    Requested Prescriptions   Pending Prescriptions Disp Refills     QUEtiapine (SEROQUEL) 50 MG tablet [Pharmacy Med Name: QUETIAPINE FUMARATE 50 MG TAB] 30 tablet 8     Sig: TAKE 1 TABLET BY MOUTH EVERY DAY AT BEDTIME       Antipsychotic Medications Failed - 8/11/2021 12:24 PM        Failed - Lipid panel on file within the past 12 months     No lab results found.            Failed - CBC on file in past 12 months     Recent Labs   Lab Test 06/13/20 2019 06/13/17  2142   WBC 7.0  --    GICHWBC  --  18.4*   RBC 4.46  --    GICHRBC  --  4.81   HGB 14.1 15.9   HCT 41.8 44.0    227                 Failed - A1c or Glucose on file in past 12 months     Recent Labs   Lab Test 06/13/20 2019   *       Please review patients last 3 weights. If a weight gain of >10 lbs exists, you may refill the prescription once after instructing the patient to schedule an appointment within the next 30 days.    Wt Readings from Last 3 Encounters:   04/05/21 88.3 kg (194 lb 9.6 oz)   07/06/20 84.4 kg (186 lb)   06/24/20 81.2 kg (179 lb)             Failed - Recent (6 mo) or future (30 days) visit within the authorizing provider's specialty     Patient had office visit in the last 6 months or has a visit in the next 30 days with authorizing provider or within the authorizing provider's specialty.  See \"Patient Info\" tab in inbasket, or \"Choose Columns\" in Meds & Orders section of the refill encounter.            Passed - Blood pressure under 140/90 in past 12 months     BP Readings from Last 3 Encounters:   04/05/21 135/87   07/06/20 120/72   06/24/20 130/82                 Passed - Patient is 12 " years of age or older        Passed - Heart Rate on file within past 12 months     Pulse Readings from Last 3 Encounters:   04/05/21 99   07/06/20 116   06/24/20 80               Passed - Medication is active on med list        Passed - Patient is not pregnant        Passed - No positve pregnancy test on file in past 12 months         Unable to complete prescription refill per RN Medication Refill Policy.................... Urvashi Bell RN ....................  8/12/2021   4:07 PM

## 2021-08-16 RX ORDER — QUETIAPINE FUMARATE 50 MG/1
TABLET, FILM COATED ORAL
Qty: 30 TABLET | Refills: 8 | Status: SHIPPED | OUTPATIENT
Start: 2021-08-16 | End: 2021-11-09

## 2021-11-09 ENCOUNTER — VIRTUAL VISIT (OUTPATIENT)
Dept: FAMILY MEDICINE | Facility: OTHER | Age: 31
End: 2021-11-09
Attending: FAMILY MEDICINE
Payer: COMMERCIAL

## 2021-11-09 VITALS — BODY MASS INDEX: 28.89 KG/M2 | WEIGHT: 190 LBS

## 2021-11-09 DIAGNOSIS — G47.00 INSOMNIA, UNSPECIFIED TYPE: ICD-10-CM

## 2021-11-09 DIAGNOSIS — F33.2 MAJOR DEPRESSIVE DISORDER, RECURRENT SEVERE WITHOUT PSYCHOTIC FEATURES (H): Primary | ICD-10-CM

## 2021-11-09 PROCEDURE — 99212 OFFICE O/P EST SF 10 MIN: CPT | Mod: 95 | Performed by: FAMILY MEDICINE

## 2021-11-09 PROCEDURE — G0463 HOSPITAL OUTPT CLINIC VISIT: HCPCS | Mod: TEL,95 | Performed by: FAMILY MEDICINE

## 2021-11-09 RX ORDER — QUETIAPINE FUMARATE 50 MG/1
TABLET, FILM COATED ORAL
Qty: 90 TABLET | Refills: 11 | Status: SHIPPED | OUTPATIENT
Start: 2021-11-09 | End: 2021-12-20

## 2021-11-09 RX ORDER — QUETIAPINE FUMARATE 200 MG/1
TABLET, FILM COATED ORAL
Qty: 90 TABLET | Refills: 11 | Status: SHIPPED | OUTPATIENT
Start: 2021-11-09 | End: 2021-12-20

## 2021-11-09 ASSESSMENT — PAIN SCALES - GENERAL: PAINLEVEL: NO PAIN (0)

## 2021-11-09 NOTE — PROGRESS NOTES
Ysabel is a 31 year old who is being evaluated via a billable telephone visit.      What phone number would you like to be contacted at? [phone]  How would you like to obtain your AVS? Mail a copy        Subjective   Ysabel is a 31 year old who presents for telephone visit for medication check.     HPI I last saw her in July 2020 for depression just after she moved out of her parents house before she was seen and was moving back to the [place].  When she was here, she was working with a new therapist at [facility].  We agreed to see her for her adult ADHD per our protocol since she had a treatment plan outlined by the psychiatrist.  We gave her a 3-month prescription of Adderall and continued on Seroquel 20 mg at bedtime with option to take 50 mg instead on morning she had to get up earlier.  We encouraged her to connect with a psychiatrist once she is established in the [facility].  She called for a Seroquel refill the summer which was given, and they encouraged her to make a follow-up appointment.  PHQ 7/6/2020 4/5/2021 11/9/2021   PHQ-9 Total Score 24 18 14   Q9: Thoughts of better off dead/self-harm past 2 weeks Nearly every day Several days Several days     She is doing well. She likes her  apartment and her job. She is engaged and is getting  in [place] in September 2022.     Review of Systems   ROS is negative except as noted above           Objective    Vitals - Patient Reported  Pain Score: No Pain (0)        Physical Exam   healthy, alert and no distress  PSYCH: Alert and oriented times 3; coherent speech, normal   rate and volume, able to articulate logical thoughts, able   to abstract reason, no tangential thoughts, no hallucinations   or delusions  Her affect is normal  RESP: No cough, no audible wheezing, able to talk in full sentences  Remainder of exam unable to be completed due to telephone visits      Ysabel was seen today for recheck medication.    Diagnoses and all orders  for this visit:    Major depressive disorder, recurrent severe without psychotic features (H)    Insomnia, unspecified type  -     QUEtiapine (SEROQUEL) 200 MG tablet; TAKE 1 TABLET BY MOUTH EVERYDAY AT BEDTIME  -     QUEtiapine (SEROQUEL) 50 MG tablet; TAKE 1 TABLET BY MOUTH EVERY DAY AT BEDTIME       Congratulated on her engagement.  Renewed her Seroquel.  She will keep in touch with her progress.  No active suicidal ideations.  Continues to see her therapist.        Phone call duration: 18 minutes    Zay Carlos MD

## 2021-11-09 NOTE — NURSING NOTE
"Chief Complaint   Patient presents with     Recheck Medication       Initial Wt 86.2 kg (190 lb)   LMP  (LMP Unknown)   Breastfeeding No   BMI 28.89 kg/m   Estimated body mass index is 28.89 kg/m  as calculated from the following:    Height as of 4/5/21: 1.727 m (5' 8\").    Weight as of this encounter: 86.2 kg (190 lb).  Medication Reconciliation: complete    FOOD SECURITY SCREENING QUESTIONS  Hunger Vital Signs:  Within the past 12 months we worried whether our food would run out before we got money to buy more. Never  Within the past 12 months the food we bought just didn't last and we didn't have money to get more. Never    Indiana Gutiérrez LPN  "

## 2021-11-10 ASSESSMENT — PATIENT HEALTH QUESTIONNAIRE - PHQ9: SUM OF ALL RESPONSES TO PHQ QUESTIONS 1-9: 14

## 2021-12-20 ENCOUNTER — TELEPHONE (OUTPATIENT)
Dept: FAMILY MEDICINE | Facility: OTHER | Age: 31
End: 2021-12-20
Payer: COMMERCIAL

## 2021-12-20 DIAGNOSIS — G47.00 INSOMNIA, UNSPECIFIED TYPE: ICD-10-CM

## 2021-12-20 RX ORDER — QUETIAPINE FUMARATE 200 MG/1
TABLET, FILM COATED ORAL
Qty: 90 TABLET | Refills: 11 | Status: SHIPPED | OUTPATIENT
Start: 2021-12-20 | End: 2022-09-26

## 2021-12-20 RX ORDER — QUETIAPINE FUMARATE 50 MG/1
TABLET, FILM COATED ORAL
Qty: 90 TABLET | Refills: 11 | Status: SHIPPED | OUTPATIENT
Start: 2021-12-20 | End: 2022-09-24

## 2021-12-20 NOTE — TELEPHONE ENCOUNTER
Both scripts for seroquel pending, states also thought she was going to get prescription for adderall. Notes did not indicate renewing them at visit on 11/9/21.  Verito Mckeon LPN .............12/20/2021     3:39 PM

## 2021-12-20 NOTE — TELEPHONE ENCOUNTER
Med refills went to [facility] and they didn't take her insurance and said they would have you send it to [facility] -please call patient --ran out a couple days ago

## 2021-12-20 NOTE — TELEPHONE ENCOUNTER
We had agreed to do her meds until she established with a psychiatrist in the [facility]. We have not prescribed Adderall since fall of 2020. She has not had any controlled substances for the last year.    PDMP Review       Value Time User    State PDMP site checked  Yes 12/20/2021  3:57 PM Zay Carlos MD         Prescription sent for Seroquel.  Zay Carlos MD on 12/20/2021 at 4:00 PM

## 2022-09-23 DIAGNOSIS — G47.00 INSOMNIA, UNSPECIFIED TYPE: ICD-10-CM

## 2022-09-24 ENCOUNTER — VIRTUAL VISIT (OUTPATIENT)
Dept: FAMILY MEDICINE | Facility: OTHER | Age: 32
End: 2022-09-24
Attending: NURSE PRACTITIONER
Payer: MEDICAID

## 2022-09-24 DIAGNOSIS — G47.00 INSOMNIA, UNSPECIFIED TYPE: ICD-10-CM

## 2022-09-24 PROCEDURE — 99441 PR PHYSICIAN TELEPHONE EVALUATION 5-10 MIN: CPT | Mod: 93 | Performed by: NURSE PRACTITIONER

## 2022-09-24 RX ORDER — QUETIAPINE FUMARATE 50 MG/1
TABLET, FILM COATED ORAL
Qty: 40 TABLET | Refills: 0 | Status: SHIPPED | OUTPATIENT
Start: 2022-09-24 | End: 2022-10-03

## 2022-09-24 NOTE — PROGRESS NOTES
Ysabel is a 31 year old who is being evaluated via a billable telephone visit.      What phone number would you like to be contacted at? [phone]  How would you like to obtain your AVS? None    Assessment & Plan     Insomnia, unspecified type    - QUEtiapine (SEROQUEL) 50 MG tablet; TAKE 3 to 4 TABLETS BY MOUTH EVERY DAY AT BEDTIME    Patient recently moved back to the [facility] from [place] and needs a temporary refill of her Seroquel until she follows up with her PCP on 10/3/2022.  Patient takes either 150 mg or 200 mg at bedtime for insomnia depending on her assessment of the degree of difficulty she will have sleeping at night.  Patient is provided with a short refill until she can be seen in follow-up as scheduled on 10/3/2022.    Phone call duration: 9 minutes    Renee Turner NP  [facility]    Subjective   Ysabel is a 31 year old presenting via phone visit for the following health issues:  Medication Request      HPI   Patient recently moved back to the [facility] from [place] and needs a temporary refill of her Seroquel until she follows up with her PCP on 10/3/2022.   She currently has Rx for 200 mg tab and 50 mg tabs (3 tabs).  Patient takes either 150 mg or 200 mg at bedtime for insomnia depending on her assessment of the degree of difficulty she will have sleeping at night.  Patient tolerates this medication without any side effects or concerns noted.  She has been using [facility], last filled through mail order.   She has an appointment with her PC on 10/3/22      Objective           Vitals:  No vitals were obtained today due to virtual visit.    Physical Exam   healthy, alert and no distress  PSYCH: Alert and oriented times 3; coherent speech, normal   rate and volume, able to articulate logical thoughts, able   to abstract reason, no tangential thoughts, no hallucinations   or delusions  Her affect is anxious  RESP: No cough, no audible wheezing,  able to talk in full sentences  Remainder of exam unable to be completed due to telephone visits

## 2022-09-26 RX ORDER — QUETIAPINE FUMARATE 200 MG/1
TABLET, FILM COATED ORAL
Qty: 30 TABLET | Refills: 0 | Status: SHIPPED | OUTPATIENT
Start: 2022-09-26 | End: 2022-10-03

## 2022-09-26 NOTE — TELEPHONE ENCOUNTER
Routing refill request to provider for review/approval because:    LOV:   7/6/2020  Patient noted to have an appointment on 10/3/22    Angelia Greer RN on 9/26/2022 at 3:47 PM

## 2022-10-03 ENCOUNTER — OFFICE VISIT (OUTPATIENT)
Dept: FAMILY MEDICINE | Facility: OTHER | Age: 32
End: 2022-10-03
Attending: FAMILY MEDICINE
Payer: MEDICAID

## 2022-10-03 VITALS
WEIGHT: 194 LBS | DIASTOLIC BLOOD PRESSURE: 84 MMHG | SYSTOLIC BLOOD PRESSURE: 124 MMHG | RESPIRATION RATE: 16 BRPM | BODY MASS INDEX: 29.4 KG/M2 | OXYGEN SATURATION: 99 % | HEIGHT: 68 IN | TEMPERATURE: 97.4 F | HEART RATE: 96 BPM

## 2022-10-03 DIAGNOSIS — Z23 HIGH PRIORITY FOR 2019-NCOV VACCINE: ICD-10-CM

## 2022-10-03 DIAGNOSIS — E07.9 THYROID MASS: ICD-10-CM

## 2022-10-03 DIAGNOSIS — F90.9 ATTENTION DEFICIT HYPERACTIVITY DISORDER (ADHD), UNSPECIFIED ADHD TYPE: ICD-10-CM

## 2022-10-03 DIAGNOSIS — G47.00 INSOMNIA, UNSPECIFIED TYPE: ICD-10-CM

## 2022-10-03 DIAGNOSIS — F33.2 MAJOR DEPRESSIVE DISORDER, RECURRENT SEVERE WITHOUT PSYCHOTIC FEATURES (H): Primary | ICD-10-CM

## 2022-10-03 LAB
T3FREE SERPL-MCNC: 3.3 PG/ML (ref 2–4.4)
T4 FREE SERPL-MCNC: 0.82 NG/DL (ref 0.6–1.6)
TSH SERPL DL<=0.005 MIU/L-ACNC: 1.99 MU/L (ref 0.4–4)

## 2022-10-03 PROCEDURE — 99214 OFFICE O/P EST MOD 30 MIN: CPT | Performed by: FAMILY MEDICINE

## 2022-10-03 PROCEDURE — G0463 HOSPITAL OUTPT CLINIC VISIT: HCPCS

## 2022-10-03 PROCEDURE — 84481 FREE ASSAY (FT-3): CPT | Mod: ZL | Performed by: FAMILY MEDICINE

## 2022-10-03 PROCEDURE — 36415 COLL VENOUS BLD VENIPUNCTURE: CPT | Mod: ZL | Performed by: FAMILY MEDICINE

## 2022-10-03 PROCEDURE — 84439 ASSAY OF FREE THYROXINE: CPT | Mod: ZL | Performed by: FAMILY MEDICINE

## 2022-10-03 PROCEDURE — G0463 HOSPITAL OUTPT CLINIC VISIT: HCPCS | Mod: 25

## 2022-10-03 PROCEDURE — 84443 ASSAY THYROID STIM HORMONE: CPT | Mod: ZL | Performed by: FAMILY MEDICINE

## 2022-10-03 PROCEDURE — 91312 COVID-19,PF,PFIZER BOOSTER BIVALENT: CPT

## 2022-10-03 RX ORDER — QUETIAPINE FUMARATE 200 MG/1
TABLET, FILM COATED ORAL
Qty: 30 TABLET | Refills: 11 | Status: SHIPPED | OUTPATIENT
Start: 2022-10-03 | End: 2023-10-19

## 2022-10-03 RX ORDER — DEXTROAMPHETAMINE SACCHARATE, AMPHETAMINE ASPARTATE, DEXTROAMPHETAMINE SULFATE AND AMPHETAMINE SULFATE 5; 5; 5; 5 MG/1; MG/1; MG/1; MG/1
20 TABLET ORAL DAILY
Qty: 30 TABLET | Refills: 0 | Status: SHIPPED | OUTPATIENT
Start: 2022-10-03 | End: 2022-11-02

## 2022-10-03 RX ORDER — DEXTROAMPHETAMINE SACCHARATE, AMPHETAMINE ASPARTATE, DEXTROAMPHETAMINE SULFATE AND AMPHETAMINE SULFATE 5; 5; 5; 5 MG/1; MG/1; MG/1; MG/1
20 TABLET ORAL DAILY
Status: CANCELLED | OUTPATIENT
Start: 2022-10-03

## 2022-10-03 RX ORDER — QUETIAPINE FUMARATE 50 MG/1
TABLET, FILM COATED ORAL
Qty: 40 TABLET | Refills: 11 | Status: SHIPPED | OUTPATIENT
Start: 2022-10-03 | End: 2023-10-19

## 2022-10-03 RX ORDER — DEXTROAMPHETAMINE SACCHARATE, AMPHETAMINE ASPARTATE, DEXTROAMPHETAMINE SULFATE AND AMPHETAMINE SULFATE 5; 5; 5; 5 MG/1; MG/1; MG/1; MG/1
20 TABLET ORAL DAILY
Qty: 30 TABLET | Refills: 0 | Status: SHIPPED | OUTPATIENT
Start: 2022-12-04 | End: 2023-01-03

## 2022-10-03 RX ORDER — DEXTROAMPHETAMINE SACCHARATE, AMPHETAMINE ASPARTATE, DEXTROAMPHETAMINE SULFATE AND AMPHETAMINE SULFATE 5; 5; 5; 5 MG/1; MG/1; MG/1; MG/1
20 TABLET ORAL DAILY
Qty: 30 TABLET | Refills: 0 | Status: SHIPPED | OUTPATIENT
Start: 2022-11-03 | End: 2022-12-03

## 2022-10-03 ASSESSMENT — ANXIETY QUESTIONNAIRES
7. FEELING AFRAID AS IF SOMETHING AWFUL MIGHT HAPPEN: SEVERAL DAYS
5. BEING SO RESTLESS THAT IT IS HARD TO SIT STILL: NOT AT ALL
3. WORRYING TOO MUCH ABOUT DIFFERENT THINGS: SEVERAL DAYS
6. BECOMING EASILY ANNOYED OR IRRITABLE: SEVERAL DAYS
1. FEELING NERVOUS, ANXIOUS, OR ON EDGE: SEVERAL DAYS
GAD7 TOTAL SCORE: 6
GAD7 TOTAL SCORE: 6
7. FEELING AFRAID AS IF SOMETHING AWFUL MIGHT HAPPEN: SEVERAL DAYS
GAD7 TOTAL SCORE: 6
4. TROUBLE RELAXING: SEVERAL DAYS
2. NOT BEING ABLE TO STOP OR CONTROL WORRYING: SEVERAL DAYS
IF YOU CHECKED OFF ANY PROBLEMS ON THIS QUESTIONNAIRE, HOW DIFFICULT HAVE THESE PROBLEMS MADE IT FOR YOU TO DO YOUR WORK, TAKE CARE OF THINGS AT HOME, OR GET ALONG WITH OTHER PEOPLE: SOMEWHAT DIFFICULT
8. IF YOU CHECKED OFF ANY PROBLEMS, HOW DIFFICULT HAVE THESE MADE IT FOR YOU TO DO YOUR WORK, TAKE CARE OF THINGS AT HOME, OR GET ALONG WITH OTHER PEOPLE?: SOMEWHAT DIFFICULT

## 2022-10-03 ASSESSMENT — PAIN SCALES - GENERAL: PAINLEVEL: NO PAIN (0)

## 2022-10-03 ASSESSMENT — PATIENT HEALTH QUESTIONNAIRE - PHQ9
10. IF YOU CHECKED OFF ANY PROBLEMS, HOW DIFFICULT HAVE THESE PROBLEMS MADE IT FOR YOU TO DO YOUR WORK, TAKE CARE OF THINGS AT HOME, OR GET ALONG WITH OTHER PEOPLE: SOMEWHAT DIFFICULT
SUM OF ALL RESPONSES TO PHQ QUESTIONS 1-9: 9
SUM OF ALL RESPONSES TO PHQ QUESTIONS 1-9: 9

## 2022-10-03 NOTE — LETTER
[facility]  10/03/22  Patient: Ysabel David  YOB: 1990  Medical Record Number: [id number]                                                                                  Non-Opioid Controlled Substance Agreement    This is an agreement between you and your provider regarding safe and appropriate use of controlled substances prescribed by your care team. Controlled substances are?medicines that can cause physical and mental dependence (abuse).     There are strict laws about having and using these medicines. We here at [facility] are  committed to working with you in your efforts to get better. To support you in this work, we'll help you schedule regular office appointments for medicine refills. If we must cancel or change your appointment for any reason, we'll make sure you have enough medicine to last until your next appointment.     As a Provider, I will:     Listen carefully to your concerns while treating you with respect.     Recommend a treatment plan that I believe is in your best interest and may involve therapies other than medicine.      Talk with you often about the possible benefits and the risk of harm of any medicine that we prescribe for you.    Assess the safety of this medicine and check how well it works.      Provide a plan on how to taper (discontinue or go off) using this medicine if the decision is made to stop its use.      ::  As a Patient, I understand controlled substances:       Are prescribed by my care provider to help me function or work and manage my condition(s).?    Are strong medicines and can cause serious side effects.       Need to be taken exactly as prescribed.?Combining controlled substances with certain medicines or chemicals (such as illegal drugs, alcohol, sedatives, sleeping pills, and benzodiazepines) can be dangerous or even fatal.? If I stop taking my medicines suddenly, I may have severe withdrawal symptoms.     The risks,  benefits, and side effects of these medicine(s) were explained to me. I agree that:    1. I will take part in other treatments as advised by my care team. This may be psychiatry or counseling, physical therapy, behavioral therapy, group treatment or a referral to specialist.    2. I will keep all my appointments and understand this is part of the monitoring of controlled substances.?My care team may require an office visit for EVERY controlled substance refill. If I miss appointments or don t follow instructions, my care team may stop my medicine    3. I will take my medicines as prescribed. I will not change the dose or schedule unless my care team tells me to. There will be no refills if I run out early.      4. I may be asked to come to the clinic and complete a urine drug test or complete a pill count. If I don t give a urine sample or participate in a pill count, the care team may stop my medicine.    5. I will only receive controlled substance prescriptions from this clinic. If I am treated by another provider, I will tell them that I am taking controlled substances and that I have a treatment agreement with this provider. I will inform my [facility] care team within one business day if I am given a prescription for any controlled substance by another healthcare provider. My [facility] care team can contact other providers and pharmacists about my use of any medicines.    6. It is up to me to make sure that I don't run out of my medicines on weekends or holidays.?If my care team is willing to refill my prescription without a visit, I must request refills only during office hours. Refills may take up to 3 business days to process. I will use one pharmacy to fill all my controlled substance prescriptions. I will notify the clinic about any changes to my insurance or medicine availability.    7. I am responsible for my prescriptions. If the medicine/prescription is lost, stolen or destroyed, it will  not be replaced.?I also agree not to share controlled substance medicines with anyone.     8. I am aware I should not use any illegal or recreational drugs. I agree not to drink alcohol unless my care team says I can.     9. If I enroll in the [place] Medical Cannabis program, I will tell my care team before my next refill.    10. I will tell my care team right away if I become pregnant, have a new medical problem treated outside of my regular clinic, or have a change in my medicines.     11. I understand that this medicine can affect my thinking, judgment and reaction time.? Alcohol and drugs affect the brain and body, which can affect the safety of my driving. Being under the influence of alcohol or drugs can affect my decision-making, behaviors, personal safety and the safety of others. Driving while impaired (DWI) can occur if a person is driving, operating or in physical control of a car, motorcycle, boat, snowmobile, ATV, motorbike, off-road vehicle or any other motor vehicle ([place] Statute 169A.20). I understand the risk if I choose to drive or operate any vehicle or machinery.    I understand that if I do not follow any of the conditions above, my prescriptions or treatment may be stopped or changed.   I agree that my provider, clinic care team and pharmacy may work with any city, state or federal law enforcement agency that investigates the misuse, sale or other diversion of my controlled medicine. I will allow my provider to discuss my care with, or share a copy of, this agreement with any other treating provider, pharmacy or emergency room where I receive care.     I have read this agreement and have asked questions about anything I did not understand.    ________________________________________________________  Patient Signature - Ysabel David     ___________________                   Date     ________________________________________________________  Provider Signature - Zay Carlos MD        ___________________                   Date     ________________________________________________________  Witness Signature (required if provider not present while patient signing)          ___________________                   Date

## 2022-10-03 NOTE — PROGRESS NOTES
"      Zackery Grady is a 32 year old, presenting for the following health issues:  Recheck Medication (Talking about getting on medication for her mental health)      History of Present Illness       Reason for visit:  Meds    She eats 0-1 servings of fruits and vegetables daily.She consumes 0 sweetened beverage(s) daily.She exercises with enough effort to increase her heart rate 9 or less minutes per day.  She exercises with enough effort to increase her heart rate 3 or less days per week.   She is taking medications regularly.    Today's PHQ-9         PHQ-9 Total Score: 9    PHQ-9 Q9 Thoughts of better off dead/self-harm past 2 weeks :   Several days  Thoughts of suicide or self harm: (P) Yes  Self-harm Plan:   (P) No  Self-harm Action:     (P) No  Safety concerns for self or others: (P) No    How difficult have these problems made it for you to do your work, take care of things at home, or get along with other people: Somewhat difficult  Today's YUKO-7 Score: 6             Review of Systems         Objective    /84   Pulse 96   Temp 97.4  F (36.3  C) (Temporal)   Resp 16   Ht 1.721 m (5' 7.75\")   Wt 88 kg (194 lb)   LMP  (LMP Unknown)   SpO2 99%   Breastfeeding No   BMI 29.72 kg/m    Body mass index is 29.72 kg/m .  Physical Exam                       "

## 2022-10-03 NOTE — PROGRESS NOTES
"Nursing Notes:   Indiana Gutiérrez LPN  10/3/2022 11:27 AM  Signed  Chief Complaint   Patient presents with     Recheck Medication     Talking about getting on medication for her mental health     She moved back to [place] in July. She wants to talk about medications for her mental health and she has a lump in her throat.  Indiana Gutiérrez LPN..................10/3/2022   11:22 AM    Initial /84   Pulse 96   Temp 97.4  F (36.3  C) (Temporal)   Resp 16   Ht 1.721 m (5' 7.75\")   Wt 88 kg (194 lb)   LMP  (LMP Unknown)   SpO2 99%   Breastfeeding No   BMI 29.72 kg/m   Estimated body mass index is 29.72 kg/m  as calculated from the following:    Height as of this encounter: 1.721 m (5' 7.75\").    Weight as of this encounter: 88 kg (194 lb).  Medication Reconciliation: complete    FOOD SECURITY SCREENING QUESTIONS  Hunger Vital Signs:  Within the past 12 months we worried whether our food would run out before we got money to buy more. Never  Within the past 12 months the food we bought just didn't last and we didn't have money to get more. Never        Advance care directive on file? no  Advance care directive provided to patient? declined     Indiana Gutiérrez LPN      SUBJECTIVE:  Ysabel David  is a 32 year old female who comes in today for medication follow-up.  I last saw her about a year ago.prior to that, I had seen her in July 2020 for depression just after she moved out of her parents house before she was seen and was moving back to the [place].  When she was here, she was working with a new therapist at [facility].  We agreed to see her for her adult ADHD per our protocol since she had a treatment plan outlined by the psychiatrist.  We gave her a 3-month prescription of Adderall and continued on Seroquel 20 mg at bedtime with option to take 50 mg instead on morning she had to get up earlier.  We encouraged her to connect with a psychiatrist once she is established in the [facility]. "  She called for a Seroquel refill the summer which was given, and they encouraged her to make a follow-up appointment.  When I saw her in November, she was liking her job and her apartment and was engaged and wasgetting  in [place] in September 2022, but they have postponed it til next summer.    She is still taking Adderall 20 mg daily most every day.    PDMP Review       Value Time User    State PDMP site checked  Yes 10/3/2022 11:32 AM Zay Carlos MD           They moved back to [place] this summer in July.  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 She had a drug test for work last week and passed her drug test.  Her fiance is back working at [employer] and at [employer].     PHQ 4/5/2021 11/9/2021 10/3/2022   PHQ-9 Total Score 18 14 9   Q9: Thoughts of better off dead/self-harm past 2 weeks Several days Several days Several days   F/U: Thoughts of suicide or self-harm - - Yes   F/U: Self harm-plan - - No   F/U: Self-harm action - - No   F/U: Safety concerns - - No     YUKO-7 SCORE 10/3/2022   Total Score 6 (mild anxiety)   Total Score 6       She has a lump in her throat. She has times where she might feel overheated and has hot flushes.  She is somewhat cold blooded.  Weight is stable.  Is not tender.  Does not really affect swallowing most of the time.      Past Medical, Family, and Social History reviewed and updated as noted below.   ROS is negative except as noted above       No Known Allergies,   Family History   Problem Relation Age of Onset     Breast Cancer Maternal Grandmother         Cancer-breast     Other - See Comments Brother         Psychiatric illness,Severe depression     Colon Cancer No family hx of         Cancer-colon     Prostate Cancer No family hx of         Cancer-prostate     Ovarian Cancer No family hx of         Cancer-ovarian     Blood Disease No family hx of         Blood Disease     Asthma No family hx of         Asthma      Heart Disease No family hx of         Heart Disease     Diabetes No family hx of         Diabetes     Hypertension No family hx of         Hypertension     Seizure Disorder No family hx of         Seizures     Thyroid Disease No family hx of         Thyroid Disease   ,   Current Outpatient Medications   Medication     amphetamine-dextroamphetamine (ADDERALL) 20 MG per tablet     amphetamine-dextroamphetamine (ADDERALL) 20 MG tablet     [START ON 11/3/2022] amphetamine-dextroamphetamine (ADDERALL) 20 MG tablet     [START ON 12/4/2022] amphetamine-dextroamphetamine (ADDERALL) 20 MG tablet     etonogestrel (NEXPLANON) 68 MG IMPL     QUEtiapine (SEROQUEL) 200 MG tablet     QUEtiapine (SEROQUEL) 50 MG tablet     No current facility-administered medications for this visit.   ,   Past Medical History:   Diagnosis Date     Borderline personality disorder (H)     ?     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) 07/06/2020     Priority: Medium     Stress due to family tension 07/06/2020     Priority: Medium     Postoperative state 06/21/2017     Priority: Medium     Headache 03/01/2011     Priority: Medium     Insomnia 03/01/2011     Priority: Medium     Other syndromes affecting cervical region 03/01/2011     Priority: Medium   ,   Past Surgical History:   Procedure Laterality Date     APPENDECTOMY OPEN      06/14/2017,Lap Appy    and   Social History     Tobacco Use     Smoking status: Never Smoker     Smokeless tobacco: Never Used   Substance Use Topics     Alcohol use: Yes     Comment: Alcoholic  "Drinks/day: socially     OBJECTIVE:  /84   Pulse 96   Temp 97.4  F (36.3  C) (Temporal)   Resp 16   Ht 1.721 m (5' 7.75\")   Wt 88 kg (194 lb)   LMP  (LMP Unknown)   SpO2 99%   Breastfeeding No   BMI 29.72 kg/m     EXAM:  Alert cooperative, no distress.  Affect is broad ranging and appropriate.  She has a mass in the left side of the thyroid that is nontender.  It feels smooth and more cystic.  No palpable adenopathy.  Throat is clear.  Lungs are clear, no rales rhonchi or wheezes are heard.  Cardiac RRR without murmur.    Results for orders placed or performed in visit on 10/03/22   T4, Free     Status: Normal   Result Value Ref Range    Free T4 0.82 0.60 - 1.60 ng/dL   TSH     Status: Normal   Result Value Ref Range    TSH 1.99 0.40 - 4.00 mU/L      ASSESSMENT/Plan :    Ysabel was seen today for recheck medication and imm/inj.    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)    Insomnia, unspecified type  -     QUEtiapine (SEROQUEL) 200 MG tablet; TAKE 1 TABLET BY MOUTH AT BEDTIME  -     QUEtiapine (SEROQUEL) 50 MG tablet; TAKE 3 to 4 TABLETS BY MOUTH EVERY DAY AT BEDTIME    Attention deficit hyperactivity disorder (ADHD), unspecified ADHD type  -     amphetamine-dextroamphetamine (ADDERALL) 20 MG tablet; Take 1 tablet (20 mg) by mouth daily for 30 days  -     amphetamine-dextroamphetamine (ADDERALL) 20 MG tablet; Take 1 tablet (20 mg) by mouth daily for 30 days  -     amphetamine-dextroamphetamine (ADDERALL) 20 MG tablet; Take 1 tablet (20 mg) by mouth daily for 30 days    High priority for 2019-nCoV vaccine  -     COVID-19,PF,PFIZER BOOSTER BIVALENT 12+Yrs    Thyroid mass  -     T3, Free; Future  -     TSH with free T4 reflex; Future  -     T4, Free; Future  -     US Thyroid; Future  -     T4, Free  -     Cancel: TSH with free T4 reflex  -     T3, Free  -     TSH        Thyroid function tests are normal.  We will proceed with " ultrasound of the thyroid is a neck step.  We will proceed from there depending on the findings.    COVID-19 booster given today.    Renewed medication contract for Adderall.  Order for ToxAssure placed.  3-month prescription with follow-up thereafter.  Okay to do a virtual visit at that time.    Renewed Seroquel which she has been on and stable with for several years.    A total of 37 minutes was spent with the patient, reviewing records, tests, ordering medications, tests or procedures and documenting clinical information in the EHR.      Zay Carlos MD            Answers for HPI/ROS submitted by the patient on 10/3/2022  If you checked off any problems, how difficult have these problems made it for you to do your work, take care of things at home, or get along with other people?: Somewhat difficult  PHQ9 TOTAL SCORE: 9  YUKO 7 TOTAL SCORE: 6  What is the reason for your visit today? : Meds  How many servings of fruits and vegetables do you eat daily?: 0-1  On average, how many sweetened beverages do you drink each day (Examples: soda, juice, sweet tea, etc.  Do NOT count diet or artificially sweetened beverages)?: 0  How many minutes a day do you exercise enough to make your heart beat faster?: 9 or less  How many days a week do you exercise enough to make your heart beat faster?: 3 or less  How many days per week do you miss taking your medication?: 0

## 2022-10-03 NOTE — NURSING NOTE
"Chief Complaint   Patient presents with     Recheck Medication     Talking about getting on medication for her mental health     She moved back to [place] in July. She wants to talk about medications for her mental health and she has a lump in her throat.  Indiana Gutiérrez LPN..................10/3/2022   11:22 AM    Initial /84   Pulse 96   Temp 97.4  F (36.3  C) (Temporal)   Resp 16   Ht 1.721 m (5' 7.75\")   Wt 88 kg (194 lb)   LMP  (LMP Unknown)   SpO2 99%   Breastfeeding No   BMI 29.72 kg/m   Estimated body mass index is 29.72 kg/m  as calculated from the following:    Height as of this encounter: 1.721 m (5' 7.75\").    Weight as of this encounter: 88 kg (194 lb).  Medication Reconciliation: complete    FOOD SECURITY SCREENING QUESTIONS  Hunger Vital Signs:  Within the past 12 months we worried whether our food would run out before we got money to buy more. Never  Within the past 12 months the food we bought just didn't last and we didn't have money to get more. Never        Advance care directive on file? no  Advance care directive provided to patient? declined     Indiana Gutiérrez LPN  "

## 2022-10-27 ENCOUNTER — HOSPITAL ENCOUNTER (OUTPATIENT)
Dept: ULTRASOUND IMAGING | Facility: OTHER | Age: 32
Discharge: HOME | End: 2022-10-27
Attending: FAMILY MEDICINE | Admitting: FAMILY MEDICINE
Payer: MEDICAID

## 2022-10-27 DIAGNOSIS — E07.9 THYROID MASS: Primary | ICD-10-CM

## 2022-10-27 DIAGNOSIS — E07.9 THYROID MASS: ICD-10-CM

## 2022-10-27 PROCEDURE — 76536 US EXAM OF HEAD AND NECK: CPT

## 2022-10-28 DIAGNOSIS — E07.9 THYROID MASS: Primary | ICD-10-CM

## 2022-11-22 ENCOUNTER — LAB (OUTPATIENT)
Dept: LAB | Facility: OTHER | Age: 32
End: 2022-11-22
Attending: RADIOLOGY
Payer: COMMERCIAL

## 2022-11-22 ENCOUNTER — HOSPITAL ENCOUNTER (OUTPATIENT)
Dept: ULTRASOUND IMAGING | Facility: OTHER | Age: 32
Discharge: HOME | End: 2022-11-22
Attending: FAMILY MEDICINE
Payer: COMMERCIAL

## 2022-11-22 VITALS
TEMPERATURE: 98 F | DIASTOLIC BLOOD PRESSURE: 99 MMHG | OXYGEN SATURATION: 99 % | HEART RATE: 86 BPM | SYSTOLIC BLOOD PRESSURE: 149 MMHG | RESPIRATION RATE: 18 BRPM

## 2022-11-22 DIAGNOSIS — E07.9 THYROID MASS: ICD-10-CM

## 2022-11-22 LAB
ERYTHROCYTE [DISTWIDTH] IN BLOOD BY AUTOMATED COUNT: 11.3 % (ref 10–15)
HCT VFR BLD AUTO: 40.6 % (ref 35–47)
HGB BLD-MCNC: 14.2 G/DL (ref 11.7–15.7)
MCH RBC QN AUTO: 32.6 PG (ref 26.5–33)
MCHC RBC AUTO-ENTMCNC: 35 G/DL (ref 31.5–36.5)
MCV RBC AUTO: 93 FL (ref 78–100)
PLATELET # BLD AUTO: 199 10E3/UL (ref 150–450)
RBC # BLD AUTO: 4.35 10E6/UL (ref 3.8–5.2)
WBC # BLD AUTO: 6.3 10E3/UL (ref 4–11)

## 2022-11-22 PROCEDURE — 88173 CYTOPATH EVAL FNA REPORT: CPT

## 2022-11-22 PROCEDURE — 36415 COLL VENOUS BLD VENIPUNCTURE: CPT | Mod: ZL

## 2022-11-22 PROCEDURE — 85027 COMPLETE CBC AUTOMATED: CPT | Mod: ZL

## 2022-11-22 PROCEDURE — 10005 FNA BX W/US GDN 1ST LES: CPT

## 2022-11-22 PROCEDURE — 250N000009 HC RX 250: Performed by: RADIOLOGY

## 2022-11-22 RX ADMIN — LIDOCAINE HYDROCHLORIDE 10 ML: 10 INJECTION, SOLUTION INFILTRATION; PERINEURAL at 14:48

## 2022-11-22 NOTE — DISCHARGE INSTRUCTIONS
ULTRASOUND GUIDED THYROID BIOPSY    Ultrasound guided thyroid biopsy is a procedure which involves the insertion of a small needle into your thyroid gland to withdraw a small amount of tissue that will be examined by a pathologist. Your doctor will notify you of the results of your biopsy, usually within a few days. Because this procedure requires the insertion of a needle into your thyroid gland, there is a small risk of bleeding, local tenderness and rarely infection.    ACTIVITY: Most patients can return to work the day, however, avoid any vigorous physical activity for 24 hours.    COMFORT: If you experience discomfort or tenderness at the site, you may take  Your usual or recommended pain medication. Do not take aspirin the day of the procedure. You may feel more comfortable lying with your head partially raised. Avoid strain on your neck to support your head while you sit up.    DIET: You may resume your normal diet.    CARE OF BIOPSY SITE: Occasionally, a patient may have a small amount of bleeding from the needle puncture site. If this happens, you should lie down and apply gentle direct pressure to the bleeding site for about 10 minutes. When the bleeding has stopped, apply another clean band-aid.  Keep the bandage on for 24 hours. Then you may remove the bandage and shower. You may put on a clean band-aid or leave it open to air.    RETURN TO AN EMERGENCY ROOM FOR:   * persistent bleeding  * difficulty breathing or neck swelling    CALL YOUR DOCTOR FOR:   For questions, problems or concerns, contact the Radiology Department at [phone].  * a fever over 101 degrees  * Increased redness, increased swelling, and/or persistent drainage or discomfort around the site

## 2022-11-22 NOTE — ED NOTES
Patient Information     Patient Name MRN Sex Ysabel Bailey [id number] Female 1990      ED Nursing Note by Orquidea Gerardo RN at 2017 11:32 PM     Author:  Orquidea Gerardo RN Service:  (none) Author Type:  NURS- Registered Nurse     Filed:  2017 11:32 PM Date of Service:  2017 11:32 PM Status:  Signed     :  Orquidea Gerardo RN (NURS- Registered Nurse)            MD at bedside.          Sarecycline Pregnancy And Lactation Text: This medication is Pregnancy Category D and not consider safe during pregnancy. It is also excreted in breast milk.

## 2022-11-22 NOTE — OR NURSING
Total of 5 samples taken.  Cytology personnel in room: Sarah   Insertion complications: none  Patient tolerated the procedure:  well  Bandaide applied:  Was

## 2022-11-30 ENCOUNTER — TELEPHONE (OUTPATIENT)
Dept: FAMILY MEDICINE | Facility: OTHER | Age: 32
End: 2022-11-30

## 2022-11-30 DIAGNOSIS — D34 HURTHLE CELL NEOPLASM OF THYROID: Primary | ICD-10-CM

## 2022-11-30 NOTE — TELEPHONE ENCOUNTER
The has questions about her thyroid biopsy and would appreciate a call.  She works the next few days 1:30 to 6 pm.

## 2022-11-30 NOTE — TELEPHONE ENCOUNTER
I spoke to the patient and she stated she will be at work from now until 6 and can not take a call so you can call her tomorrow before 1:30 pm.  Indiana Gutiérrez LPN..................11/30/2022   1:36 PM

## 2022-12-01 NOTE — PROGRESS NOTES
[facility] was notified of Genomic Testing add-on. Pathology Add-on Request, patient insurance, and patient notes were sent to [facility] on 12/01/2022 at 1100.   -Henny Gutiérrez MLS

## 2022-12-01 NOTE — TELEPHONE ENCOUNTER
Spoke to the patient on the phone.  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  It sounds like they have enough tissue to be able to do genomic testing.  I contacted our lab and they will contact the lab at [facility] to figure out how I go about ordering the test and what I need to do logistically.  Once the genomic test is done, I will call and talk to endocrinology to see what her next steps need to be if any.  If they do not have enough tissue, I suspect that she would need a thyroid biopsy and will talk to Dr. Juarez about that if need be.  Also sent referral to endocrinology at [facility] to get that in the works although it takes about a year to get in so we may need to expedite that with a phone call to one of the endocrinologist there once we have more information.    Discussed this all with the patient and she seems comfortable with this.  We will contact her once we know more information.  Advised her that it would take a while for this genomic test to come back.  Zay Carlos MD on 12/1/2022 at 10:22 AM

## 2022-12-05 ENCOUNTER — MYC MEDICAL ADVICE (OUTPATIENT)
Dept: FAMILY MEDICINE | Facility: OTHER | Age: 32
End: 2022-12-05

## 2022-12-05 DIAGNOSIS — D34 HURTHLE CELL NEOPLASM OF THYROID: Primary | ICD-10-CM

## 2022-12-14 ENCOUNTER — MYC MEDICAL ADVICE (OUTPATIENT)
Dept: FAMILY MEDICINE | Facility: OTHER | Age: 32
End: 2022-12-14

## 2022-12-14 DIAGNOSIS — D34 HURTHLE CELL NEOPLASM OF THYROID: Primary | ICD-10-CM

## 2022-12-14 LAB — PATH REPORT.FINAL DX SPEC: NORMAL

## 2023-01-03 ENCOUNTER — OFFICE VISIT (OUTPATIENT)
Dept: OTOLARYNGOLOGY | Facility: OTHER | Age: 33
End: 2023-01-03
Attending: OTOLARYNGOLOGY
Payer: COMMERCIAL

## 2023-01-03 DIAGNOSIS — E07.9 DISORDER OF THYROID, UNSPECIFIED: Primary | ICD-10-CM

## 2023-01-03 PROCEDURE — G0463 HOSPITAL OUTPT CLINIC VISIT: HCPCS

## 2023-01-05 NOTE — PROGRESS NOTES
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  She has no symptoms directly related to the mass.  She was seen by her primary care doctor in October who initiated workup with an ultrasound and thyroid studies.  Her thyroid function is normal.  The thyroid ultrasound confirmed a 1.2 cm lesion in the right lobe and a 3.5 cm lesion in the left lobe.  Needle biopsy was performed of the left lobe lesion which confirmed a Hurthle cell lesion.  Subsequent molecular studies returned suspicious putting her risk of malignancy at about 70-75%.  On further questioning she wonders if she has some occasional sense of shortness of breath.  She denies any swallowing difficulties.  There is no tenderness over the lesion.    Exam  Oral cavity oropharynx-free of mucosal lesions or inflammation  Nasal-no obstruction or purulence  Indirect laryngoscopy-he has a neurologically intact larynx without mucosal lesion  Neck-he has a large palpable mass in the left lobe of the thyroid that moves easily with swallow.  There is no palpable cervical adenopathy appreciated.  Head and neck integument-Clear  General-the patient appears well and in no distress  Neuro-there are no focal cranial nerve deficits    Allergies    No Known Allergies Allergy (Unknown, Verified 10/15/14 18:53)    PFSH  PFSH:     Past Medical History: (Updated 01/04/23 @ 09:03 by Afsaneh Greer, Med Assist)    Depression     Past  Surgical History: (Updated 01/04/23 @ 09:03 by Afsaneh Greer, Med Assist)    H/O foot surgery  Hx of appendectomy     Family History: (Updated 01/04/23 @ 09:04 by Afsaneh Greer, Med Assist)  Other  Depression       Social History: (Updated 01/04/23 @ 09:04 by Afsaneh Greer, Med Assist)  Smoking Status:  Never smoker  second hand exposure:  Yes  alcohol intake:  current  substance use type:  former substance user and marijuana       A&P  Assessment & Plan  (1) Thyroid mass:        Status: Acute        Code(s):  E07.9 - Disorder of thyroid, unspecified  I would a long discussion with the patient and her fiance.  She definitely needs to have some thyroid surgery.  The question is extent of surgery.  Should she undergo a left thyroid lobectomy and isthmusectomy as her initial operation and leave the nodule on the right so she can have functioning thyroid tissue.  This would require yearly ultrasounds and needle biopsy if it grows.  The other alternative would be total thyroidectomy upfront with slightly increased risk of parathyroid or recurrent nerve injury and the resulting hypothyroidism that would require Synthroid for the remainder of her life.  I would tend to favor the former and would advise left total thyroid lobectomy and isthmusectomy, knowing that a staged 2nd lobe may be required depending on her pathology.  We reviewed the expected postoperative course and possible complications in detail.  Their questions were answered.  It sounds as though appointment has been made with the [facility] endocrine surgeon as well.  They may be proceeding with a 2nd opinion.  We will try to get some scheduled operative time for her arranged head of time.      Elmer Juarez MD    01/03/23 0908    <Electronically signed by Elmer Juarez MD> 01/04/23 1004

## 2023-01-23 ENCOUNTER — HOSPITAL ENCOUNTER (OUTPATIENT)
Dept: GENERAL RADIOLOGY | Facility: OTHER | Age: 33
Discharge: HOME | End: 2023-01-23
Attending: FAMILY MEDICINE
Payer: COMMERCIAL

## 2023-01-23 ENCOUNTER — OFFICE VISIT (OUTPATIENT)
Dept: FAMILY MEDICINE | Facility: OTHER | Age: 33
End: 2023-01-23
Attending: FAMILY MEDICINE
Payer: COMMERCIAL

## 2023-01-23 VITALS
SYSTOLIC BLOOD PRESSURE: 138 MMHG | RESPIRATION RATE: 16 BRPM | BODY MASS INDEX: 30.94 KG/M2 | TEMPERATURE: 98 F | HEART RATE: 107 BPM | OXYGEN SATURATION: 98 % | DIASTOLIC BLOOD PRESSURE: 84 MMHG | WEIGHT: 202 LBS

## 2023-01-23 DIAGNOSIS — G89.29 CHRONIC MIDLINE THORACIC BACK PAIN: Primary | ICD-10-CM

## 2023-01-23 DIAGNOSIS — D34 HURTHLE CELL NEOPLASM OF THYROID: ICD-10-CM

## 2023-01-23 DIAGNOSIS — F33.2 MAJOR DEPRESSIVE DISORDER, RECURRENT SEVERE WITHOUT PSYCHOTIC FEATURES (H): ICD-10-CM

## 2023-01-23 DIAGNOSIS — F41.9 ANXIETY: ICD-10-CM

## 2023-01-23 DIAGNOSIS — F90.9 ATTENTION DEFICIT HYPERACTIVITY DISORDER (ADHD), UNSPECIFIED ADHD TYPE: ICD-10-CM

## 2023-01-23 DIAGNOSIS — M54.6 CHRONIC MIDLINE THORACIC BACK PAIN: ICD-10-CM

## 2023-01-23 DIAGNOSIS — M54.6 CHRONIC MIDLINE THORACIC BACK PAIN: Primary | ICD-10-CM

## 2023-01-23 DIAGNOSIS — G89.29 CHRONIC MIDLINE THORACIC BACK PAIN: ICD-10-CM

## 2023-01-23 PROCEDURE — 99215 OFFICE O/P EST HI 40 MIN: CPT | Performed by: FAMILY MEDICINE

## 2023-01-23 PROCEDURE — G0463 HOSPITAL OUTPT CLINIC VISIT: HCPCS

## 2023-01-23 PROCEDURE — G0463 HOSPITAL OUTPT CLINIC VISIT: HCPCS | Mod: 25

## 2023-01-23 PROCEDURE — 72070 X-RAY EXAM THORAC SPINE 2VWS: CPT

## 2023-01-23 RX ORDER — DEXTROAMPHETAMINE SACCHARATE, AMPHETAMINE ASPARTATE MONOHYDRATE, DEXTROAMPHETAMINE SULFATE AND AMPHETAMINE SULFATE 7.5; 7.5; 7.5; 7.5 MG/1; MG/1; MG/1; MG/1
30 CAPSULE, EXTENDED RELEASE ORAL DAILY
Qty: 30 CAPSULE | Refills: 0 | Status: SHIPPED | OUTPATIENT
Start: 2023-01-23 | End: 2023-07-18

## 2023-01-23 RX ORDER — DEXTROAMPHETAMINE SACCHARATE, AMPHETAMINE ASPARTATE, DEXTROAMPHETAMINE SULFATE AND AMPHETAMINE SULFATE 7.5; 7.5; 7.5; 7.5 MG/1; MG/1; MG/1; MG/1
30 TABLET ORAL DAILY
Qty: 30 TABLET | Refills: 0 | Status: SHIPPED | OUTPATIENT
Start: 2023-01-23 | End: 2023-07-18

## 2023-01-23 ASSESSMENT — ANXIETY QUESTIONNAIRES
2. NOT BEING ABLE TO STOP OR CONTROL WORRYING: SEVERAL DAYS
8. IF YOU CHECKED OFF ANY PROBLEMS, HOW DIFFICULT HAVE THESE MADE IT FOR YOU TO DO YOUR WORK, TAKE CARE OF THINGS AT HOME, OR GET ALONG WITH OTHER PEOPLE?: SOMEWHAT DIFFICULT
7. FEELING AFRAID AS IF SOMETHING AWFUL MIGHT HAPPEN: SEVERAL DAYS
1. FEELING NERVOUS, ANXIOUS, OR ON EDGE: MORE THAN HALF THE DAYS
IF YOU CHECKED OFF ANY PROBLEMS ON THIS QUESTIONNAIRE, HOW DIFFICULT HAVE THESE PROBLEMS MADE IT FOR YOU TO DO YOUR WORK, TAKE CARE OF THINGS AT HOME, OR GET ALONG WITH OTHER PEOPLE: SOMEWHAT DIFFICULT
6. BECOMING EASILY ANNOYED OR IRRITABLE: SEVERAL DAYS
GAD7 TOTAL SCORE: 9
7. FEELING AFRAID AS IF SOMETHING AWFUL MIGHT HAPPEN: SEVERAL DAYS
GAD7 TOTAL SCORE: 9
5. BEING SO RESTLESS THAT IT IS HARD TO SIT STILL: NOT AT ALL
3. WORRYING TOO MUCH ABOUT DIFFERENT THINGS: MORE THAN HALF THE DAYS
4. TROUBLE RELAXING: MORE THAN HALF THE DAYS
GAD7 TOTAL SCORE: 9

## 2023-01-23 ASSESSMENT — PATIENT HEALTH QUESTIONNAIRE - PHQ9
10. IF YOU CHECKED OFF ANY PROBLEMS, HOW DIFFICULT HAVE THESE PROBLEMS MADE IT FOR YOU TO DO YOUR WORK, TAKE CARE OF THINGS AT HOME, OR GET ALONG WITH OTHER PEOPLE: SOMEWHAT DIFFICULT
SUM OF ALL RESPONSES TO PHQ QUESTIONS 1-9: 16
SUM OF ALL RESPONSES TO PHQ QUESTIONS 1-9: 16

## 2023-01-23 ASSESSMENT — PAIN SCALES - GENERAL: PAINLEVEL: NO PAIN (0)

## 2023-01-23 NOTE — NURSING NOTE
"Chief Complaint   Patient presents with     RECHECK     Back pain       Initial /84   Pulse 107   Temp 98  F (36.7  C) (Temporal)   Resp 16   Wt 91.6 kg (202 lb)   LMP 01/09/2023 (Approximate)   SpO2 98%   Breastfeeding No   BMI 30.94 kg/m   Estimated body mass index is 30.94 kg/m  as calculated from the following:    Height as of 10/3/22: 1.721 m (5' 7.75\").    Weight as of this encounter: 91.6 kg (202 lb).  Medication Reconciliation: complete    FOOD SECURITY SCREENING QUESTIONS  Hunger Vital Signs:  Within the past 12 months we worried whether our food would run out before we got money to buy more. Never  Within the past 12 months the food we bought just didn't last and we didn't have money to get more. Never        Advance care directive on file? no  Advance care directive provided to patient? declined     [place] Gutiérrez, DARÍO  " The patient is a 67y Female complaining of

## 2023-01-23 NOTE — PROGRESS NOTES
Ysabel was seen today for recheck.    Diagnoses and all orders for this visit:    Chronic midline thoracic back pain  -     Physical Therapy Referral; Future  -     XR Thoracic Spine 2 Views; Future    Hurthle cell neoplasm of thyroid    Major depressive disorder, recurrent severe without psychotic features (H)  -     Adult Mental Health  Referral; Future    Attention deficit hyperactivity disorder (ADHD), unspecified ADHD type  -     Adult Mental Health  Referral; Future  -     amphetamine-dextroamphetamine (ADDERALL XR) 30 MG 24 hr capsule; Take 1 capsule (30 mg) by mouth daily  -     amphetamine-dextroamphetamine (ADDERALL) 30 MG tablet; Take 1 tablet (30 mg) by mouth daily    Anxiety  -     Adult Mental Health  Referral; Future       I think for her midthoracic back pain, I suspect this is multifactorial probably due to multiple psychosocial stressors causing some muscle tension.  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  She has very good posture but I think she would benefit from physical therapy evaluation and treatment.  X-ray is reassuring and there is no worrisome findings.  Could consider pursuing MRI of the thoracic spine if she has ongoing difficulties.    For her Hurthle cell neoplasm of the thyroid, she will meet with the endocrine surgeon later this week.  Certainly her choice whether she decides to go with Dr. Juarez or Dr. Pretty, but I think either would be a good choice.    With regard to her mental health, support and encouragement offered and she has insight into the fact that her current situation with her thyroid is uncertain and is probably causing her some anxiety.  She cannot really get the 20 mg Adderall and it seems that she is not using them as much in the evening so our plan was to put her on 30 mg Adderall XR for the day and 30 mg of Adderall immediate release on a as needed  basis in the evening and she could certainly do a half a tablet if needed.  Also I felt that it would be reasonable for her to have psychiatric evaluation for consideration of changing her Seroquel or potentially adding something else or doing something different for her ADHD.  She has been looking into doing some therapy but has not acted on it yet.  Support and encouragement offered.    A total of 62 minutes was spent with the patient, reviewing records, tests, ordering medications, tests or procedures and documenting clinical information in the EHR.     She will follow-up with her preop prior to surgery.    Zay Carlos MD     Zackery Bright is a 32 year old, presenting for the following health issues:  RECHECK (Back pain)    Patient is here for follow-up.  She has a known Hurthle cell neoplasm and is being scheduled for partial thyroidectomy.  She saw Dr. Juarez and his note is reviewed.  She saw Dr. Devi prior to that and he suggested one of their endocrine surgeons.  She has an appointment on Thursday with Dr. Pretty.    She has ADHD for which we resumed her Adderall last fall and put her under contract for it.  She has not been taking it lately.  She was on 20 mg and used it often for evening writing. She has not been writing much of late. She has been using it for focus doing the day. She feels she is having more brain fog. There has been a shortage of the 20's.  She wonders if the dose is correct. She is losing things and seems a bit more absent minded. She has continue with Seroquel 200 mg at bedtime and then 50 mg as needed.  We filled that for her as she had been stable on it through psychiatry for a number of years. She is falling asleep ok, but is waking up during the night.     She is having significant depressive symptoms. It has worsened significantly since when I saw her in the fall.  This is likely due to the stress involved with the uncertainty around her thyroid.    PHQ 11/9/2021  10/3/2022 1/23/2023   PHQ-9 Total Score 14 9 16   Q9: Thoughts of better off dead/self-harm past 2 weeks Several days Several days Several days   F/U: Thoughts of suicide or self-harm - Yes Yes   F/U: Self harm-plan - No No   F/U: Self-harm action - No No   F/U: Safety concerns - No No     YUKO-7 SCORE 10/3/2022 1/23/2023   Total Score 6 (mild anxiety) 9 (mild anxiety)   Total Score 6 9     She has been having some trouble with her back. She has had trouble for some time, in fact most of her life. It is typically shoulders, neck and thoracic area.  It bothers daily.  It intensifies toward the end of the day.  It bothers more when sitting in the car. It bothers more with tighter shirts or sits in a stiff backed chair.  She is not bothered doing a massage. She has good posture.  She uses a massage cane. She will have monthly massage. She takes a lot of ibuprofen or tylenol.  She has been using essential oils. She has more the shoulder. She wears a 40 DDD. She has normal ROM.  She is more affected right rib pain.       History of Present Illness       Reason for visit:  Med adjustment, chronic back pain, brain fog, bad heat regulation    She eats 2-3 servings of fruits and vegetables daily.She consumes 0 sweetened beverage(s) daily.She exercises with enough effort to increase her heart rate 9 or less minutes per day.  She exercises with enough effort to increase her heart rate 3 or less days per week. She is missing 7 dose(s) of medications per week.    Today's PHQ-9         PHQ-9 Total Score: 16    PHQ-9 Q9 Thoughts of better off dead/self-harm past 2 weeks :   Several days  Thoughts of suicide or self harm: (P) Yes  Self-harm Plan:   (P) No  Self-harm Action:     (P) No  Safety concerns for self or others: (P) No    How difficult have these problems made it for you to do your work, take care of things at home, or get along with other people: Somewhat difficult  Today's YUKO-7 Score: 9         Review of Systems   ROS is  negative except as noted above                Objective    /84   Pulse 107   Temp 98  F (36.7  C) (Temporal)   Resp 16   Wt 91.6 kg (202 lb)   LMP 01/09/2023 (Approximate)   SpO2 98%   Breastfeeding No   BMI 30.94 kg/m    Body mass index is 30.94 kg/m .  Physical Exam   Alert cooperative, no distress.  Affect is broad ranging and appropriate.  No formal thought disorder.  Does not seem terribly anxious.  She is clearheaded and conversant.  She is articulate and is not tearful.    Examination of her thoracic spine reveals no specific tenderness or asymmetry.  She has good normal rotational range of motion of her thoracic spine.  She has no radicular findings.  She is tight across the trapezius and neck strap muscles but range is normal.    Results for orders placed or performed during the hospital encounter of 01/23/23   XR Thoracic Spine 2 Views     Status: None    Narrative    Exam: XR THORACIC SPINE 2 VIEWS    Exam reason: midthoracic back pain; Chronic midline thoracic back  pain; Chronic midline thoracic back pain    Technique: AP, lateral, and swimmers views were obtained.    Comparison: 6/13/2020    Findings:     No acute fracture is identified.     Normal alignment.     No significant degenerative changes.    Paraspinous soft tissues are unremarkable.      Impression    Impression:    No acute fracture or subluxation.    DAPHNIE PAULSON MD         SYSTEM ID:  [id number]

## 2023-01-23 NOTE — LETTER
My Depression Action Plan  Name: Ysabel David   Date of Birth 1990  Date: 1/23/2023    My doctor: Zay Carlos   My clinic: [facility]  [address]  [place] [id number] [phone]          GREEN    ZONE   Good Control    What it looks like:     Things are going generally well. You have normal ups and downs. You may even feel depressed from time to time, but bad moods usually last less than a day.   What you need to do:  1. Continue to care for yourself (see self care plan)  2. Check your depression survival kit and update it as needed  3. Follow your physician s recommendations including any medication.  4. Do not stop taking medication unless you consult with your physician first.           YELLOW         ZONE Getting Worse    What it looks like:     Depression is starting to interfere with your life.     It may be hard to get out of bed; you may be starting to isolate yourself from others.    Symptoms of depression are starting to last most all day and this has happened for several days.     You may have suicidal thoughts but they are not constant.   What you need to do:     1. Call your care team. Your response to treatment will improve if you keep your care team informed of your progress. Yellow periods are signs an adjustment may need to be made.     2. Continue your self-care.  Just get dressed and ready for the day.  Don't give yourself time to talk yourself out of it.    3. Talk to someone in your support network.    4. Open up your Depression Self-Care Plan/Wellness Kit.           RED    ZONE Medical Alert - Get Help    What it looks like:     Depression is seriously interfering with your life.     You may experience these or other symptoms: You can t get out of bed most days, can t work or engage in other necessary activities, you have trouble taking care of basic hygiene, or basic responsibilities, thoughts of suicide or death that will not  go away, self-injurious behavior.     What you need to do:  1. Call your care team and request a same-day appointment. If they are not available (weekends or after hours) call your local crisis line, emergency room or 911.          Depression Self-Care Plan / Wellness Kit    Many people find that medication and therapy are helpful treatments for managing depression. In addition, making small changes to your everyday life can help to boost your mood and improve your wellbeing. Below are some tips for you to consider. Be sure to talk with your medical provider and/or behavioral health consultant if your symptoms are worsening or not improving.     Sleep   Sleep hygiene  means all of the habits that support good, restful sleep. It includes maintaining a consistent bedtime and wake time, using your bedroom only for sleeping or sex, and keeping the bedroom dark and free of distractions like a computer, smartphone, or television.     Develop a Healthy Routine  Maintain good hygiene. Get out of bed in the morning, make your bed, brush your teeth, take a shower, and get dressed. Don t spend too much time viewing media that makes you feel stressed. Find time to relax each day.    Exercise  Get some form of exercise every day. This will help reduce pain and release endorphins, the  feel good  chemicals in your brain. It can be as simple as just going for a walk or doing some gardening, anything that will get you moving.      Diet  Strive to eat healthy foods, including fruits and vegetables. Drink plenty of water. Avoid excessive sugar, caffeine, alcohol, and other mood-altering substances.     Stay Connected with Others  Stay in touch with friends and family members.    Manage Your Mood  Try deep breathing, massage therapy, biofeedback, or meditation. Take part in fun activities when you can. Try to find something to smile about each day.     Psychotherapy  Be open to working with a therapist if your provider recommends it.      Medication  Be sure to take your medication as prescribed. Most anti-depressants need to be taken every day. It usually takes several weeks for medications to work. Not all medicines work for all people. It is important to follow-up with your provider to make sure you have a treatment plan that is working for you. Do not stop your medication abruptly without first discussing it with your provider.    Crisis Resources   These hotlines are for both adults and children. They and are open 24 hours a day, 7 days a week unless noted otherwise.      National Suicide Prevention Lifeline   988 or 6-652-976-QKEO (8904)      Crisis Text Line    www.crisistextline.org  Text HOME to 717604 from anywhere in the [place], anytime, about any type of crisis. A live, trained crisis counselor will receive the text and respond quickly.      [place] Lifeline for LGBTQ Youth  A national crisis intervention and suicide lifeline for LGBTQ youth under 25. Provides a safe place to talk without judgement. Call 1-377.856.1487; text START to 760697 or visit www.thetrevorproject.org to talk to a trained counselor.      For [facility] crisis numbers, visit the [facility] website at:  https://[place].gov/dhs/people-we-serve/adults/health-care/mental-health/resources/crisis-contacts.jsp

## 2023-01-30 ASSESSMENT — PATIENT HEALTH QUESTIONNAIRE - PHQ9
SUM OF ALL RESPONSES TO PHQ QUESTIONS 1-9: 16
SUM OF ALL RESPONSES TO PHQ QUESTIONS 1-9: 16
10. IF YOU CHECKED OFF ANY PROBLEMS, HOW DIFFICULT HAVE THESE PROBLEMS MADE IT FOR YOU TO DO YOUR WORK, TAKE CARE OF THINGS AT HOME, OR GET ALONG WITH OTHER PEOPLE: SOMEWHAT DIFFICULT

## 2023-01-31 ENCOUNTER — OFFICE VISIT (OUTPATIENT)
Dept: FAMILY MEDICINE | Facility: OTHER | Age: 33
End: 2023-01-31
Attending: PHYSICIAN ASSISTANT
Payer: COMMERCIAL

## 2023-01-31 VITALS
TEMPERATURE: 97.9 F | HEIGHT: 67 IN | OXYGEN SATURATION: 100 % | WEIGHT: 200.8 LBS | RESPIRATION RATE: 16 BRPM | BODY MASS INDEX: 31.52 KG/M2 | HEART RATE: 83 BPM | DIASTOLIC BLOOD PRESSURE: 78 MMHG | SYSTOLIC BLOOD PRESSURE: 128 MMHG

## 2023-01-31 DIAGNOSIS — Z01.818 PREOP GENERAL PHYSICAL EXAM: Primary | ICD-10-CM

## 2023-01-31 LAB
ALBUMIN SERPL BCG-MCNC: 4.4 G/DL (ref 3.5–5.2)
ALP SERPL-CCNC: 72 U/L (ref 35–104)
ALT SERPL W P-5'-P-CCNC: 22 U/L (ref 10–35)
ANION GAP SERPL CALCULATED.3IONS-SCNC: 6 MMOL/L (ref 7–15)
AST SERPL W P-5'-P-CCNC: 15 U/L (ref 10–35)
ATRIAL RATE - MUSE: 85 BPM
BASOPHILS # BLD AUTO: 0 10E3/UL (ref 0–0.2)
BASOPHILS NFR BLD AUTO: 0 %
BILIRUB SERPL-MCNC: 0.4 MG/DL
BUN SERPL-MCNC: 13.1 MG/DL (ref 6–20)
CALCIUM SERPL-MCNC: 9.2 MG/DL (ref 8.6–10)
CHLORIDE SERPL-SCNC: 103 MMOL/L (ref 98–107)
CREAT SERPL-MCNC: 0.73 MG/DL (ref 0.51–0.95)
DEPRECATED HCO3 PLAS-SCNC: 28 MMOL/L (ref 22–29)
DIASTOLIC BLOOD PRESSURE - MUSE: NORMAL MMHG
EOSINOPHIL # BLD AUTO: 0.1 10E3/UL (ref 0–0.7)
EOSINOPHIL NFR BLD AUTO: 1 %
ERYTHROCYTE [DISTWIDTH] IN BLOOD BY AUTOMATED COUNT: 11.4 % (ref 10–15)
GFR SERPL CREATININE-BSD FRML MDRD: >90 ML/MIN/1.73M2
GLUCOSE SERPL-MCNC: 113 MG/DL (ref 70–99)
HCT VFR BLD AUTO: 40.5 % (ref 35–47)
HGB BLD-MCNC: 14.3 G/DL (ref 11.7–15.7)
HOLD SPECIMEN: NORMAL
IMM GRANULOCYTES # BLD: 0.1 10E3/UL
IMM GRANULOCYTES NFR BLD: 1 %
INTERPRETATION ECG - MUSE: NORMAL
LYMPHOCYTES # BLD AUTO: 2 10E3/UL (ref 0.8–5.3)
LYMPHOCYTES NFR BLD AUTO: 24 %
MCH RBC QN AUTO: 32.7 PG (ref 26.5–33)
MCHC RBC AUTO-ENTMCNC: 35.3 G/DL (ref 31.5–36.5)
MCV RBC AUTO: 93 FL (ref 78–100)
MONOCYTES # BLD AUTO: 0.5 10E3/UL (ref 0–1.3)
MONOCYTES NFR BLD AUTO: 6 %
NEUTROPHILS # BLD AUTO: 5.4 10E3/UL (ref 1.6–8.3)
NEUTROPHILS NFR BLD AUTO: 68 %
NRBC # BLD AUTO: 0 10E3/UL
NRBC BLD AUTO-RTO: 0 /100
P AXIS - MUSE: 57 DEGREES
PLATELET # BLD AUTO: 183 10E3/UL (ref 150–450)
POTASSIUM SERPL-SCNC: 4.2 MMOL/L (ref 3.4–5.3)
PR INTERVAL - MUSE: 166 MS
PROT SERPL-MCNC: 7 G/DL (ref 6.4–8.3)
QRS DURATION - MUSE: 112 MS
QT - MUSE: 394 MS
QTC - MUSE: 468 MS
R AXIS - MUSE: 88 DEGREES
RBC # BLD AUTO: 4.37 10E6/UL (ref 3.8–5.2)
SODIUM SERPL-SCNC: 137 MMOL/L (ref 136–145)
SYSTOLIC BLOOD PRESSURE - MUSE: NORMAL MMHG
T AXIS - MUSE: 67 DEGREES
VENTRICULAR RATE- MUSE: 85 BPM
WBC # BLD AUTO: 8.1 10E3/UL (ref 4–11)

## 2023-01-31 PROCEDURE — G0463 HOSPITAL OUTPT CLINIC VISIT: HCPCS

## 2023-01-31 PROCEDURE — 93005 ELECTROCARDIOGRAM TRACING: CPT | Performed by: PHYSICIAN ASSISTANT

## 2023-01-31 PROCEDURE — 93010 ELECTROCARDIOGRAM REPORT: CPT | Performed by: STUDENT IN AN ORGANIZED HEALTH CARE EDUCATION/TRAINING PROGRAM

## 2023-01-31 PROCEDURE — 85025 COMPLETE CBC W/AUTO DIFF WBC: CPT | Mod: ZL | Performed by: PHYSICIAN ASSISTANT

## 2023-01-31 PROCEDURE — 36415 COLL VENOUS BLD VENIPUNCTURE: CPT | Mod: ZL | Performed by: PHYSICIAN ASSISTANT

## 2023-01-31 PROCEDURE — 99214 OFFICE O/P EST MOD 30 MIN: CPT | Performed by: PHYSICIAN ASSISTANT

## 2023-01-31 PROCEDURE — 80053 COMPREHEN METABOLIC PANEL: CPT | Mod: ZL | Performed by: PHYSICIAN ASSISTANT

## 2023-01-31 ASSESSMENT — PAIN SCALES - GENERAL: PAINLEVEL: NO PAIN (0)

## 2023-01-31 NOTE — PATIENT INSTRUCTIONS
For informational purposes only. Not to replace the advice of your health care provider. Copyright   2003,  [facility]. All rights reserved. Clinically reviewed by Dorcas Frausto MD. [employer] [id number] - REV .  Preparing for Your Surgery  Getting started  A nurse will call you to review your health history and instructions. They will give you an arrival time based on your scheduled surgery time. Please be ready to share:    Your doctor's clinic name and phone number    Your medical, surgical, and anesthesia history    A list of allergies and sensitivities    A list of medicines, including herbal treatments and over-the-counter drugs    Whether the patient has a legal guardian (ask how to send us the papers in advance)  Please tell us if you're pregnant--or if there's any chance you might be pregnant. Some surgeries may injure a fetus (unborn baby), so they require a pregnancy test. Surgeries that are safe for a fetus don't always need a test, and you can choose whether to have one.   If you have a child who's having surgery, please ask for a copy of Preparing for Your Child's Surgery.    Preparing for surgery    Within 10 to 30 days of surgery: Have a pre-op exam (sometimes called an H&P, or History and Physical). This can be done at a clinic or pre-operative center.  ? If you're having a , you may not need this exam. Talk to your care team.    At your pre-op exam, talk to your care team about all medicines you take. If you need to stop any medicines before surgery, ask when to start taking them again.  ? We do this for your safety. Many medicines can make you bleed too much during surgery. Some change how well surgery (anesthesia) drugs work.    Call your insurance company to let them know you're having surgery. (If you don't have insurance, call [phone].)    Call your clinic if there's any change in your health. This includes signs of a cold or flu (sore throat, runny nose,  cough, rash, fever). It also includes a scrape or scratch near the surgery site.    If you have questions on the day of surgery, call your hospital or surgery center.  Eating and drinking guidelines  For your safety: Unless your surgeon tells you otherwise, follow the guidelines below.    Eat and drink as usual until 8 hours before you arrive for surgery. After that, no food or milk.    Drink clear liquids until 2 hours before you arrive. These are liquids you can see through, like water, Gatorade, and Propel Water. They also include plain black coffee and tea (no cream or milk), candy, and breath mints. You can spit out gum when you arrive.    If you drink alcohol: Stop drinking it the night before surgery.    If your care team tells you to take medicine on the morning of surgery, it's okay to take it with a sip of water.  Preventing infection    Shower or bathe the night before and morning of your surgery. Follow the instructions your clinic gave you. (If no instructions, use regular soap.)    Don't shave or clip hair near your surgery site. We'll remove the hair if needed.    Don't smoke or vape the morning of surgery. You may chew nicotine gum up to 2 hours before surgery. A nicotine patch is okay.  ? Note: Some surgeries require you to completely quit smoking and nicotine. Check with your surgeon.    Your care team will make every effort to keep you safe from infection. We will:  ? Clean our hands often with soap and water (or an alcohol-based hand rub).  ? Clean the skin at your surgery site with a special soap that kills germs.  ? Give you a special gown to keep you warm. (Cold raises the risk of infection.)  ? Wear special hair covers, masks, gowns and gloves during surgery.  ? Give antibiotic medicine, if prescribed. Not all surgeries need antibiotics.  What to bring on the day of surgery    Photo ID and insurance card    Copy of your health care directive, if you have one    Glasses and hearing aids (bring  cases)  ? You can't wear contacts during surgery    Inhaler and eye drops, if you use them (tell us about these when you arrive)    CPAP machine or breathing device, if you use them    A few personal items, if spending the night    If you have . . .  ? A pacemaker, ICD (cardiac defibrillator) or other implant: Bring the ID card.  ? An implanted stimulator: Bring the remote control.  ? A legal guardian: Bring a copy of the certified (court-stamped) guardianship papers.  Please remove any jewelry, including body piercings. Leave jewelry and other valuables at home.  If you're going home the day of surgery    You must have a responsible adult drive you home. They should stay with you overnight as well.    If you don't have someone to stay with you, and you aren't safe to go home alone, we may keep you overnight. Insurance often won't pay for this.  After surgery  If it's hard to control your pain or you need more pain medicine, please call your surgeon's office.  Questions?   If you have any questions for your care team, list them here: _________________________________________________________________________________________________________________________________________________________________________ ____________________________________ ____________________________________ ____________________________________

## 2023-01-31 NOTE — PROGRESS NOTES
[facility]  [address]  [place] [address]  Phone: [phone]  Fax: [phone]  Primary Provider: Zay Carlos  Pre-op Performing Provider: BRANDY GORE    PREOPERATIVE EVALUATION:  Today's date: 1/31/2023    Ysabel David is a 32 year old female who presents for a preoperative evaluation.    Surgical Information:  Surgery/Procedure: Thyroidectomy  Surgery Location: [facility]  Surgeon:   Surgery Date: 02/14/2023  Time of Surgery: TBD  Where patient plans to recover: At home with family  Fax number for surgical facility: [phone]    Type of Anesthesia Anticipated: General    Assessment & Plan     The proposed surgical procedure is considered INTERMEDIATE risk.    1. Preop general physical exam  - CBC with Platelets & Differential ([facility] Only)  - Comprehensive Metabolic Panel  - EKG 12-lead, tracing only (Same Day)  - Cleared for surgery. CBC normal blood counts, CMP glucose mildly elevated 113 (non fasting), renal and hepatic functions are both normal. EKG normal sinus rhythm. Patient is in agreement and understanding of the above treatment plan. All questions and concerns were addressed and answered to patient's satisfaction. AVS reviewed with patient.     Possible Sleep Apnea:   STOP-Bang Total Score 1/31/2023   Total Score 1     Risks and Recommendations:  The patient has the following additional risks and recommendations for perioperative complications:   - No identified additional risk factors other than previously addressed    Medication Instructions:  OK to take Adderral morning of surgery and Seroquel night before surgery for sleep.     RECOMMENDATION:  APPROVAL GIVEN to proceed with proposed procedure, without further diagnostic evaluation.    Subjective     HPI related to upcoming procedure: patient diagnosed with Hurthle cell neoplasm of thyroid, she met with Dr. Iqbal on 1/23/23 and it was elected to perform surgery upcoming on 2/14/23  - total thyroidectomy.     Preop Questions 1/30/2023   1. Have you ever had a heart attack or stroke? No   2. Have you ever had surgery on your heart or blood vessels, such as a stent placement, a coronary artery bypass, or surgery on an artery in your head, neck, heart, or legs? No   3. Do you have chest pain with activity? No   4. Do you have a history of  heart failure? No   5. Do you currently have a cold, bronchitis or symptoms of other infection? No   6. Do you have a cough, shortness of breath, or wheezing? YES - has been intermittent for unknown duration (at least a few months, likely longer).    7. Do you or anyone in your family have previous history of blood clots? UNKNOWN - none as far as she is aware   8. Do you or does anyone in your family have a serious bleeding problem such as prolonged bleeding following surgeries or cuts? No   9. Have you ever had problems with anemia or been told to take iron pills? UNKNOWN - has not ever had to be on iron orally or infusion   10. Have you had any abnormal blood loss such as black, tarry or bloody stools, or abnormal vaginal bleeding? No   11. Have you ever had a blood transfusion? UNKNOWN - no   12. Are you willing to have a blood transfusion if it is medically needed before, during, or after your surgery? Yes   13. Have you or any of your relatives ever had problems with anesthesia? UNKNOWN - no prior adverse reactions   14. Do you have sleep apnea, excessive snoring or daytime drowsiness? YES - ongoing fatigue (attribute to thyroid and insomnia)   14a. Do you have a CPAP machine? No   15. Do you have any artifical heart valves or other implanted medical devices like a pacemaker, defibrillator, or continuous glucose monitor? No   16. Do you have artificial joints? No   17. Are you allergic to latex? No   18. Is there any chance that you may be pregnant? No       Health Care Directive:  Patient does not have a Health Care Directive or Living Will: Discussed  advance care planning with patient; information given to patient to review.    Preoperative Review of :   reviewed - controlled substances reflected in medication list.    Review of Systems  Constitutional, neuro, ENT, endocrine, pulmonary, cardiac, gastrointestinal, genitourinary, musculoskeletal, integument and psychiatric systems are negative, except as otherwise noted.    Patient Active Problem List    Diagnosis Date Noted     Attention deficit hyperactivity disorder (ADHD), unspecified ADHD type 07/06/2020     Priority: Medium     Controlled substance agreement signed 07/06/2020     Priority: Medium     Major depressive disorder, recurrent severe without psychotic features (H) 07/06/2020     Priority: Medium     Stress due to family tension 07/06/2020     Priority: Medium     Postoperative state 06/21/2017     Priority: Medium     Headache 03/01/2011     Priority: Medium     Insomnia 03/01/2011     Priority: Medium     Other syndromes affecting cervical region 03/01/2011     Priority: Medium      Past Medical History:   Diagnosis Date     Borderline personality disorder (H)     ?     Major depressive disorder, single episode     suicidal ideation, hospitalized x 3 in 2012     Personal history of other medical treatment (CODE)     Age 14     Past Surgical History:   Procedure Laterality Date     APPENDECTOMY OPEN      06/14/2017,Sheldon Eng     Current Outpatient Medications   Medication Sig Dispense Refill     amphetamine-dextroamphetamine (ADDERALL XR) 30 MG 24 hr capsule Take 1 capsule (30 mg) by mouth daily 30 capsule 0     amphetamine-dextroamphetamine (ADDERALL) 30 MG tablet Take 1 tablet (30 mg) by mouth daily 30 tablet 0     etonogestrel (NEXPLANON) 68 MG IMPL 1 each by Subdermal route once       QUEtiapine (SEROQUEL) 200 MG tablet TAKE 1 TABLET BY MOUTH AT BEDTIME 30 tablet 11     QUEtiapine (SEROQUEL) 50 MG tablet TAKE 3 to 4 TABLETS BY MOUTH EVERY DAY AT BEDTIME 40 tablet 11       No Known  "Allergies     Social History     Tobacco Use     Smoking status: Never     Smokeless tobacco: Never   Substance Use Topics     Alcohol use: Yes     Comment: Alcoholic Drinks/day: socially     Family History   Problem Relation Age of Onset     Breast Cancer Maternal Grandmother         Cancer-breast     Other - See Comments Brother         Psychiatric illness,Severe depression     Colon Cancer No family hx of         Cancer-colon     Prostate Cancer No family hx of         Cancer-prostate     Ovarian Cancer No family hx of         Cancer-ovarian     Blood Disease No family hx of         Blood Disease     Asthma No family hx of         Asthma     Heart Disease No family hx of         Heart Disease     Diabetes No family hx of         Diabetes     Hypertension No family hx of         Hypertension     Seizure Disorder No family hx of         Seizures     Thyroid Disease No family hx of         Thyroid Disease     History   Drug Use     Types: Marijuana     Comment: Drug use: Yes rarely         Objective     /78   Pulse 83   Temp 97.9  F (36.6  C) (Tympanic)   Resp 16   Ht 1.702 m (5' 7\")   Wt 91.1 kg (200 lb 12.8 oz)   LMP 01/09/2023 (Approximate)   SpO2 100%   BMI 31.45 kg/m      Physical Exam    GENERAL APPEARANCE: healthy, alert and no distress     EYES: EOMI, PERRL     HENT: ear canals and TM's normal and nose and mouth without ulcers or lesions     NECK: no adenopathy, no asymmetry, masses, or scars and thyroid normal to palpation     RESP: lungs clear to auscultation - no rales, rhonchi or wheezes     CV: regular rates and rhythm, normal S1 S2, no S3 or S4 and no murmur, click or rub     ABDOMEN:  soft, nontender, no HSM or masses and bowel sounds normal     MS: extremities normal- no gross deformities noted, no evidence of inflammation in joints, FROM in all extremities.     SKIN: no suspicious lesions or rashes     NEURO: Normal strength and tone, sensory exam grossly normal, mentation intact and " speech normal     PSYCH: mentation appears normal. and affect normal/bright     LYMPHATICS: No cervical adenopathy    Recent Labs   Lab Test 11/22/22  1402   HGB 14.2           Diagnostics:  Recent Results (from the past 24 hour(s))   EKG 12-lead, tracing only (Same Day)    Collection Time: 01/31/23 11:46 AM   Result Value Ref Range    Systolic Blood Pressure  mmHg    Diastolic Blood Pressure  mmHg    Ventricular Rate 85 BPM    Atrial Rate 85 BPM    VA Interval 166 ms    QRS Duration 112 ms     ms    QTc 468 ms    P Axis 57 degrees    R AXIS 88 degrees    T Axis 67 degrees    Interpretation ECG       Sinus rhythm  Normal ECG  When compared with ECG of 13-JUN-2020 20:27,  No significant change was found  Confirmed by Jose Francisco Bruno ([id number]) on 1/31/2023 12:29:35 PM     Comprehensive Metabolic Panel    Collection Time: 01/31/23 11:54 AM   Result Value Ref Range    Sodium 137 136 - 145 mmol/L    Potassium 4.2 3.4 - 5.3 mmol/L    Chloride 103 98 - 107 mmol/L    Carbon Dioxide (CO2) 28 22 - 29 mmol/L    Anion Gap 6 (L) 7 - 15 mmol/L    Urea Nitrogen 13.1 6.0 - 20.0 mg/dL    Creatinine 0.73 0.51 - 0.95 mg/dL    Calcium 9.2 8.6 - 10.0 mg/dL    Glucose 113 (H) 70 - 99 mg/dL    Alkaline Phosphatase 72 35 - 104 U/L    AST 15 10 - 35 U/L    ALT 22 10 - 35 U/L    Protein Total 7.0 6.4 - 8.3 g/dL    Albumin 4.4 3.5 - 5.2 g/dL    Bilirubin Total 0.4 <=1.2 mg/dL    GFR Estimate >90 >60 mL/min/1.73m2   CBC with platelets and differential    Collection Time: 01/31/23 11:54 AM   Result Value Ref Range    WBC Count 8.1 4.0 - 11.0 10e3/uL    RBC Count 4.37 3.80 - 5.20 10e6/uL    Hemoglobin 14.3 11.7 - 15.7 g/dL    Hematocrit 40.5 35.0 - 47.0 %    MCV 93 78 - 100 fL    MCH 32.7 26.5 - 33.0 pg    MCHC 35.3 31.5 - 36.5 g/dL    RDW 11.4 10.0 - 15.0 %    Platelet Count 183 150 - 450 10e3/uL    % Neutrophils 68 %    % Lymphocytes 24 %    % Monocytes 6 %    % Eosinophils 1 %    % Basophils 0 %    % Immature Granulocytes 1 %     NRBCs per 100 WBC 0 <1 /100    Absolute Neutrophils 5.4 1.6 - 8.3 10e3/uL    Absolute Lymphocytes 2.0 0.8 - 5.3 10e3/uL    Absolute Monocytes 0.5 0.0 - 1.3 10e3/uL    Absolute Eosinophils 0.1 0.0 - 0.7 10e3/uL    Absolute Basophils 0.0 0.0 - 0.2 10e3/uL    Absolute Immature Granulocytes 0.1 <=0.4 10e3/uL    Absolute NRBCs 0.0 10e3/uL   Extra Serum Separator Tube (SST)    Collection Time: 01/31/23 11:54 AM   Result Value Ref Range    Hold Specimen JIC        EKG: appears normal, NSR, normal axis, normal intervals, no acute ST/T changes c/w ischemia, no LVH by voltage criteria, unchanged from previous tracings    Revised Cardiac Risk Index (RCRI):  The patient has the following serious cardiovascular risks for perioperative complications:   - No serious cardiac risks = 0 points     RCRI Interpretation: 0 points: Class I (very low risk - 0.4% complication rate)         Signed Electronically by: Nelida Sorensen PA-C  Copy of this evaluation report is provided to requesting physician.    Answers for HPI/ROS submitted by the patient on 1/30/2023  If you checked off any problems, how difficult have these problems made it for you to do your work, take care of things at home, or get along with other people?: Somewhat difficult  PHQ9 TOTAL SCORE: 16

## 2023-01-31 NOTE — NURSING NOTE
"Chief Complaint   Patient presents with     Pre-Op Exam     Thyroidectomy       Initial /78   Pulse 83   Temp 97.9  F (36.6  C) (Tympanic)   Resp 16   Ht 1.702 m (5' 7\")   Wt 91.1 kg (200 lb 12.8 oz)   LMP 01/09/2023 (Approximate)   SpO2 100%   BMI 31.45 kg/m   Estimated body mass index is 31.45 kg/m  as calculated from the following:    Height as of this encounter: 1.702 m (5' 7\").    Weight as of this encounter: 91.1 kg (200 lb 12.8 oz).  Medication Reconciliation: complete    FOOD SECURITY SCREENING QUESTIONS  Hunger Vital Signs:  Within the past 12 months we worried whether our food would run out before we got money to buy more. Never  Within the past 12 months the food we bought just didn't last and we didn't have money to get more. Never  Anna Carver LPN 1/31/2023 11:20 AM      "

## 2023-02-23 ENCOUNTER — HOSPITAL ENCOUNTER (OUTPATIENT)
Dept: PHYSICAL THERAPY | Facility: OTHER | Age: 33
Setting detail: THERAPIES SERIES
Discharge: HOME | End: 2023-02-23
Attending: FAMILY MEDICINE
Payer: COMMERCIAL

## 2023-02-23 DIAGNOSIS — G89.29 CHRONIC MIDLINE THORACIC BACK PAIN: ICD-10-CM

## 2023-02-23 DIAGNOSIS — M54.6 CHRONIC MIDLINE THORACIC BACK PAIN: ICD-10-CM

## 2023-02-23 PROCEDURE — 97110 THERAPEUTIC EXERCISES: CPT | Mod: GP,PN

## 2023-02-23 PROCEDURE — 97161 PT EVAL LOW COMPLEX 20 MIN: CPT | Mod: GP

## 2023-02-23 NOTE — PROGRESS NOTES
02/23/23 0900   General Information   Type of Visit Initial OP Ortho PT Evaluation   Start of Care Date 02/23/23   Referring Physician Dr. Zay Carlos   Patient/Family Goals Statement to be pain free   Orders Evaluate and Treat   Date of Order 01/23/23   Certification Required? Yes   Medical Diagnosis Chronic midline thoracic back pain (M54.6, G89.29)   Surgical/Medical history reviewed Yes   Precautions/Limitations no known precautions/limitations   General Information Comments PMH: ADHD, major depressive disorder, Borderline Personality Disorder   Body Part(s)   Body Part(s) Lumbar Spine/SI   Presentation and Etiology   Pertinent history of current problem (include personal factors and/or comorbidities that impact the POC) Symptom onset of thoracic pain and cervical pain started in later teens to early 20s. Over the years worsening of symptoms noted. In last two years saw a large uptick in severity and frequency. Almost daily now. She started a diriving job. Driving is very aggravating and symptoms are also high in the evening. Resonating pain. Seems to in rhomboids and shoulder blades out to the side. Is driving at main mobility. Tension in upper trapezius and neck and into occiput. Neck is an issue with the driving. Both sides, but more on R. Variable in duration. can be an hour to a full day. Driving shifts 4 1/2 hours has 4 shifts a week, 6 1/2 shifts. [employer]. Is able to adjust seats. R shoulder and under shoulder blade. 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 Patient is a stomach sleeper and is not limited   Impairments A. Pain   Functional Limitations perform activities of daily living;perform desired leisure / sports activities;perform required work activities   Symptom Location thoracic and cervical regions, preiscap region   How/Where did it occur From insidious onset   Onset date of current episode/exacerbation   (Increased in last several months)   Chronicity Chronic   Pain  rating (0-10 point scale) Best (/10);Worst (/10);Other   Best (/10) 0   Worst (/10) 10   Pain rating comment Average:6/10   Pain quality A. Sharp;C. Aching;D. Burning;E. Shooting;F. Stabbing   Frequency of pain/symptoms B. Intermittent   Pain/symptoms are:   (worse in the evening)   Pain/symptoms exacerbated by A. Sitting  (Driving,)   Pain/symptoms eased by G. Heat   Progression of symptoms since onset: Worsened   Current / Previous Interventions   Diagnostic Tests: X-ray   X-ray Results Results   X-ray results Findings:      No acute fracture is identified.      Normal alignment.      No significant degenerative changes.     Paraspinous soft tissues are unremarkable.                                                                      Impression:     No acute fracture or subluxation.     DAPHNIE PAULSON MD   Prior Level of Function   Prior Level of Function-Mobility Good activity tolerance   Current Level of Function   Patient role/employment history A. Employed   Employment Comments  and is a massage therapist   Fall Risk Screen   Have you fallen 2 or more times in the past year? No   Timed Up and Go score (seconds) NT   Is patient a fall risk? No   Lumbar Spine/SI Objective Findings   Gait/Locomotion Normal   Lumbar ROM Comment Full and painfree for all lumbar motions. Assessed thoracic mobility noted limited thoracic extension. Full R and L thoracic rotation noted, full thoracic flexion noted. Pain reproduction with thoracic extension. Will assses cervical motion at future sessions.   Palpation Tenderness and tension at bilateral rhomboids and thoracolumbar parapsinals. PA glides performed to thoracic semgents. Hypomobility at T10-T4 and CTJ with greatest region of hypomobility at T10 and T8.   Posture Neutral posture, small loss of cervical lordosis   Planned Therapy Interventions   Planned Therapy Interventions ROM;strengthening;manual therapy;joint mobilization;stretching   Planned Therapy  Interventions Comment Manual techniques, therapeutic exercise, home program development, postural education, patient education.   Planned Modality Interventions   Planned Modality Interventions Hot packs;Cryotherapy   Planned Modality Interventions Comments as needed   Clinical Impression   Criteria for Skilled Therapeutic Interventions Met yes, treatment indicated   PT Diagnosis Patient presents with thoracic hypomobility, muscle tension and cervical pain leading to pain in cervical and thoracic regions interfering with daily tasks.   Influenced by the following impairments Pain, muscle tension, thoracic segmental hypomobility,   Functional limitations due to impairments pain and tension in thoracic and cervical region interferring with daily tasks at work, home and in community.   Clinical Presentation Stable/Uncomplicated   Clinical Presentation Rationale Clinical judgement   Clinical Decision Making (Complexity) Low complexity   Therapy Frequency 2 times/Week   Predicted Duration of Therapy Intervention (days/wks) 90 weeks   Risk & Benefits of therapy have been explained Yes   Patient, Family & other staff in agreement with plan of care Yes   Education Assessment   Barriers to Learning No barriers   ORTHO GOALS   PT Ortho Eval Goals 1;2;3;4   Ortho Goal 1   Goal Identifier STG 1 - HEP   Goal Description Patient will be independent and compliant with HEP.   Target Date 03/16/23   Ortho Goal 2   Goal Identifier LTG 1 - Pain symptoms   Goal Description Patient will report 70% or greater reduction in pain severity and frequency in thoracic and cervical regions.   Target Date 05/24/23   Ortho Goal 3   Goal Identifier LTG 2 - Thoracic and Cervical Mobility   Goal Description Patient will display full and painfree cervical and thoracic motion with good segmental mobility.   Target Date 05/24/23   Ortho Goal 4   Goal Identifier LTG -3 Driving   Goal Description Patient will be able to perform essential work tasks of  driving with use of postural support as needed without limitation from pain during or after.   Target Date 05/24/23   Total Evaluation Time   PT Eval, Low Complexity Minutes (22757) 20   Therapy Certification   Certification date from 02/23/23   Certification date to 05/24/23   Medical Diagnosis Chronic midline thoracic back pain (M54.6, G89.29)

## 2023-02-23 NOTE — PROGRESS NOTES
[facility]    OUTPATIENT PHYSICAL THERAPY ORTHOPEDIC EVALUATION  PLAN OF TREATMENT FOR OUTPATIENT REHABILITATION  (COMPLETE FOR INITIAL CLAIMS ONLY)  Patient's Last Name, First Name, M.I.  YOB: 1990  Ysabel David    Provider s Name:  [facility]   Medical Record No.  [id number]   Start of Care Date:  02/23/23   Onset Date:   (Increased in last several months)   Type:     _X__PT   ___OT   ___SLP Medical Diagnosis:  (P) Chronic midline thoracic back pain (M54.6, G89.29)     PT Diagnosis:  Patient presents with thoracic hypomobility, muscle tension and cervical pain leading to pain in cervical and thoracic regions interfering with daily tasks.   Visits from SOC:  1      _________________________________________________________________________________  Plan of Treatment/Functional Goals:  ROM, strengthening, manual therapy, joint mobilization, stretching  Manual techniques, therapeutic exercise, home program development, postural education, patient education.  Hot packs, Cryotherapy  as needed  Goals  Goal Identifier: STG 1 - HEP  Goal Description: Patient will be independent and compliant with HEP.  Target Date: 03/16/23    Goal Identifier: LTG 1 - Pain symptoms  Goal Description: Patient will report 70% or greater reduction in pain severity and frequency in thoracic and cervical regions.  Target Date: 05/24/23    Goal Identifier: LTG 2 - Thoracic and Cervical Mobility  Goal Description: Patient will display full and painfree cervical and thoracic motion with good segmental mobility.  Target Date: 05/24/23    Goal Identifier: LTG -3 Driving  Goal Description: Patient will be able to perform essential work tasks of driving with use of postural support as needed without limitation from pain during or after.  Target Date: 05/24/23                                                 Therapy Frequency:  2 times/Week  Predicted Duration of Therapy Intervention:  90 weeks    [facility]                 I CERTIFY THE NEED FOR THESE SERVICES FURNISHED UNDER        THIS PLAN OF TREATMENT AND WHILE UNDER MY CARE     (Physician co-signature of this document indicates review and certification of the therapy plan).                     Certification Date From:  02/23/23   Certification Date To:  05/24/23    Referring Provider:  Dr. Zay Carlos    Initial Assessment        See Epic Evaluation Start of Care Date: 02/23/23

## 2023-02-27 ENCOUNTER — OFFICE VISIT (OUTPATIENT)
Dept: OBGYN | Facility: OTHER | Age: 33
End: 2023-02-27
Payer: COMMERCIAL

## 2023-02-27 VITALS
SYSTOLIC BLOOD PRESSURE: 140 MMHG | OXYGEN SATURATION: 98 % | DIASTOLIC BLOOD PRESSURE: 82 MMHG | HEART RATE: 105 BPM | WEIGHT: 200 LBS | BODY MASS INDEX: 31.32 KG/M2

## 2023-02-27 DIAGNOSIS — F33.2 MAJOR DEPRESSIVE DISORDER, RECURRENT SEVERE WITHOUT PSYCHOTIC FEATURES (H): ICD-10-CM

## 2023-02-27 DIAGNOSIS — Z12.4 SCREENING FOR CERVICAL CANCER: ICD-10-CM

## 2023-02-27 DIAGNOSIS — Z97.5 BREAKTHROUGH BLEEDING ON NEXPLANON: Primary | ICD-10-CM

## 2023-02-27 DIAGNOSIS — R10.2 PELVIC PAIN IN FEMALE: ICD-10-CM

## 2023-02-27 DIAGNOSIS — N75.1 ABSCESS OF BARTHOLIN'S GLAND: ICD-10-CM

## 2023-02-27 DIAGNOSIS — N92.1 BREAKTHROUGH BLEEDING ON NEXPLANON: Primary | ICD-10-CM

## 2023-02-27 PROCEDURE — 99214 OFFICE O/P EST MOD 30 MIN: CPT

## 2023-02-27 PROCEDURE — G0463 HOSPITAL OUTPT CLINIC VISIT: HCPCS

## 2023-02-27 PROCEDURE — 87624 HPV HI-RISK TYP POOLED RSLT: CPT | Mod: ZL

## 2023-02-27 PROCEDURE — G0123 SCREEN CERV/VAG THIN LAYER: HCPCS

## 2023-02-27 RX ORDER — LEVOTHYROXINE SODIUM 175 UG/1
1 TABLET ORAL
COMMUNITY
Start: 2023-02-15 | End: 2024-04-01

## 2023-02-27 RX ORDER — NORGESTIMATE AND ETHINYL ESTRADIOL 0.25-0.035
1 KIT ORAL DAILY
Qty: 84 TABLET | Refills: 0 | Status: SHIPPED | OUTPATIENT
Start: 2023-02-27 | End: 2023-11-02

## 2023-02-27 RX ORDER — CALCIUM CARBONATE/VITAMIN D3 600 MG-10
1 TABLET ORAL
COMMUNITY
Start: 2023-02-15 | End: 2023-03-17

## 2023-02-27 ASSESSMENT — ANXIETY QUESTIONNAIRES
2. NOT BEING ABLE TO STOP OR CONTROL WORRYING: SEVERAL DAYS
5. BEING SO RESTLESS THAT IT IS HARD TO SIT STILL: NOT AT ALL
IF YOU CHECKED OFF ANY PROBLEMS ON THIS QUESTIONNAIRE, HOW DIFFICULT HAVE THESE PROBLEMS MADE IT FOR YOU TO DO YOUR WORK, TAKE CARE OF THINGS AT HOME, OR GET ALONG WITH OTHER PEOPLE: SOMEWHAT DIFFICULT
1. FEELING NERVOUS, ANXIOUS, OR ON EDGE: SEVERAL DAYS
8. IF YOU CHECKED OFF ANY PROBLEMS, HOW DIFFICULT HAVE THESE MADE IT FOR YOU TO DO YOUR WORK, TAKE CARE OF THINGS AT HOME, OR GET ALONG WITH OTHER PEOPLE?: SOMEWHAT DIFFICULT
6. BECOMING EASILY ANNOYED OR IRRITABLE: SEVERAL DAYS
7. FEELING AFRAID AS IF SOMETHING AWFUL MIGHT HAPPEN: NOT AT ALL
3. WORRYING TOO MUCH ABOUT DIFFERENT THINGS: MORE THAN HALF THE DAYS
4. TROUBLE RELAXING: SEVERAL DAYS
GAD7 TOTAL SCORE: 6
7. FEELING AFRAID AS IF SOMETHING AWFUL MIGHT HAPPEN: NOT AT ALL

## 2023-02-27 ASSESSMENT — PATIENT HEALTH QUESTIONNAIRE - PHQ9
SUM OF ALL RESPONSES TO PHQ QUESTIONS 1-9: 17
SUM OF ALL RESPONSES TO PHQ QUESTIONS 1-9: 17
10. IF YOU CHECKED OFF ANY PROBLEMS, HOW DIFFICULT HAVE THESE PROBLEMS MADE IT FOR YOU TO DO YOUR WORK, TAKE CARE OF THINGS AT HOME, OR GET ALONG WITH OTHER PEOPLE: SOMEWHAT DIFFICULT

## 2023-02-27 ASSESSMENT — PAIN SCALES - GENERAL: PAINLEVEL: NO PAIN (0)

## 2023-02-27 NOTE — PROGRESS NOTES
CC: Irregular menstrual spotting, pelvic pain, recurrent Bartholin cyst, need for Pap smear  HPI:  Ysabel is a 32 year old female who presents for irregular menstrual spotting with Nexplanon.  She notes that this has been occurring for some time and that is to the point that it is annoying her.  She does not get a regular menses, instead spots all the time.  She notes that she also has pelvic pain which is intermittent throughout the month.  There is no correlation with her menstrual cycle as the spotting is irregular as well.  She states it is severe cramp generally all over the pelvic area.  It is happening quite often.  She has began keeping a journal of symptoms this week to see exactly how often it occurs. She denies any vaginal discharge or concerns for STIs today.  She would like to pursue an ultrasound to ensure that nothing is abnormal internally.  She is unsure if she has ever been worked up for endometriosis.  She was previously on birth control pills but decided to change to Nexplanon for convenience.  She is in a stable relationship and has been for 3 years. She does not desire children nor has she ever been pregnant. She declines possibility of pregnancy. She is considering a tubal ligation.  She has a history of recurrent Bartholin cyst.  She states these come sometimes multiple times a month on either side of her labia.  She reports that they come to ahead and pop.  The most recent Bartholin cyst was on her left labia and released 2 days ago.  They do not cause her extreme pain but do cause her some irritation.  She has not considered yet to having surgery to remove these.  She is due for a Pap smear as well.  Patient is also noted to have a PHQ-9 score of 17 contracted for safety today. She states that her partner and her have an accountability to each other where if they are considering harming each other they would talk amongst themselves.  She states she has been dealing with depression since the  age of 19.  If she needs any further intervention she states that she will contact the office or be seen in the ED.     Depression Screening Follow Up    PHQ 2/27/2023   PHQ-9 Total Score 17   Q9: Thoughts of better off dead/self-harm past 2 weeks Several days   F/U: Thoughts of suicide or self-harm -   F/U: Self harm-plan -   F/U: Self-harm action -   F/U: Safety concerns -     Last PHQ-9 2/27/2023   1.  Little interest or pleasure in doing things 2   2.  Feeling down, depressed, or hopeless 2   3.  Trouble falling or staying asleep, or sleeping too much 3   4.  Feeling tired or having little energy 2   5.  Poor appetite or overeating 2   6.  Feeling bad about yourself 2   7.  Trouble concentrating 2   8.  Moving slowly or restless 1   Q9: Thoughts of better off dead/self-harm past 2 weeks 1   PHQ-9 Total Score 17   Difficulty at work, home, or with people Very difficult   In the past two weeks have you had thoughts of suicide or self harm? -   Do you have concerns about your personal safety or the safety of others? -   In the past 2 weeks have you thought about a plan or had intention to harm yourself? -   In the past 2 weeks have you acted on these thoughts in any way? -      Declines plan and has safety back up in place.      No flowsheet data found.      Follow Up      Follow Up Actions Taken  Crisis resource information provided in the After Visit Summary  Patient declined referral.    Discussed the following ways the patient can remain in a safe environment:  be around others  Depression Screening Follow-up    PHQ 2/27/2023   PHQ-9 Total Score 17   Q9: Thoughts of better off dead/self-harm past 2 weeks Several days   F/U: Thoughts of suicide or self-harm -   F/U: Self harm-plan -   F/U: Self-harm action -   F/U: Safety concerns -       Does the patient currently have a mental health provider?  No  Follow Up Actions Taken  Patient to follow up with PCP. Clinic staff to schedule appointment if able.  Patient  declined referral.     ALLISON Sy CNP        Patient's last menstrual period was 2023 (approximate).    OB History    Para Term  AB Living   0 0 0 0 0 0   SAB IAB Ectopic Multiple Live Births   0 0 0 0 0     Past Medical History:   Diagnosis Date     Borderline personality disorder (H)     ?     Major depressive disorder, single episode     suicidal ideation, hospitalized x 3 in 2012     Personal history of other medical treatment (CODE)     Age 14       Current Outpatient Medications   Medication     calcium carbonate-vitamin D (CALTRATE) 600-10 MG-MCG per tablet     levothyroxine (SYNTHROID/LEVOTHROID) 175 MCG tablet     amphetamine-dextroamphetamine (ADDERALL XR) 30 MG 24 hr capsule     amphetamine-dextroamphetamine (ADDERALL) 30 MG tablet     etonogestrel (NEXPLANON) 68 MG IMPL     QUEtiapine (SEROQUEL) 200 MG tablet     QUEtiapine (SEROQUEL) 50 MG tablet     No current facility-administered medications for this visit.     No Known Allergies  BP (!) 140/82   Pulse 105   Wt 90.7 kg (200 lb)   LMP 2023 (Approximate)   SpO2 98%   BMI 31.32 kg/m      REVIEW OF SYSTEMS  As Per HPI, Otherwise negative.     Exam:  Constitutional: healthy, alert and no distress  Breast: No masses appreciated on either breast, Nipples everted with symmetrical areolas bilaterally. No tenderness noted. No nipple discharge.   Pelvic: Normal BUSE, vulva appears normal, vaginal and cervix are normal. Pap obtained. Uterus is normal size, shape position and mobility without adnexal mass. She noted some pelvic tenderness on exam. No bartholin irritation noted.  Chaperone was present.     Lab: No results found for any visits on 23.    ASSESSMENT/PLAN :  (N92.1,  Z97.5) Breakthrough bleeding on Nexplanon  (primary encounter diagnosis)  Plan: norgestimate-ethinyl estradiol (ORTHO-CYCLEN)         0.25-35 MG-MCG tablet        Discussed with patient unfortunately that breakthrough bleeding is common with  Nexplanon.  We discussed options such as removing Nexplanon and changing birth controls or else trying to use short course of birth control pills to help lessen bleeding. She elects today to trial short  course of birth control.  She denies any personal history of breast cancer, stroke, blood clot, hypertension, or migraine with aura.  She will contact the office if she chooses to have Nexplanon removed.  It will be due to be removed this year in August.  She is considering tubal ligation for birth control as she does not desire children.Discussed process of tubal ligation.  She will schedule with one of the gynecologists if this is something that she would like to pursue.    (Z12.4) Screening for cervical cancer  Plan: Pap Screen with HPV - recommended age 30 - 65         years        Pap collected today.  Will notify patient of results.    (R10.2) Pelvic pain in female  Plan: US Pelvic Complete with Transvaginal        We will obtain pelvic ultrasound due to patient's pelvic pain.  Due to irregularity and severity of pain this will allow a better assessment of abnormalities as unable to correlate pain to a certain point in cycle.  Tenderness noted on today's exam.  Will rule out obvious abnormality.    (N75.1) Abscess of Bartholin's gland  Plan: With patient continuously getting these cysts discussed with her that she could follow-up with gynecology to possibly have a surgical option recommended.  She will consider this plan and schedule if desired.    (F33.2) Major depressive disorder, recurrent severe without psychotic features (H)  Plan: Contacted patient for safety today.  She will contact the office with any further concerns.  She is aware of emergency resources if she is to create a plan for suicide.  She states that she is safe in her environment with her significant other.      ALLISON Sy CNP  2:53 PM 2/27/2023

## 2023-02-27 NOTE — NURSING NOTE
Patient presents to clinic for pap and breast exam  BP (!) 140/82   Pulse 105   Wt 90.7 kg (200 lb)   LMP 01/09/2023 (Approximate)   SpO2 98%   BMI 31.32 kg/m    Emilia Bonilla LPN on 2/27/2023 at 2:21 PM

## 2023-02-28 ENCOUNTER — HOSPITAL ENCOUNTER (OUTPATIENT)
Dept: PHYSICAL THERAPY | Facility: OTHER | Age: 33
Setting detail: THERAPIES SERIES
Discharge: HOME | End: 2023-02-28
Attending: FAMILY MEDICINE
Payer: COMMERCIAL

## 2023-02-28 PROCEDURE — 97110 THERAPEUTIC EXERCISES: CPT | Mod: GP,PN

## 2023-02-28 PROCEDURE — 97140 MANUAL THERAPY 1/> REGIONS: CPT | Mod: GP,PN

## 2023-03-02 LAB
BKR LAB AP GYN ADEQUACY: NORMAL
BKR LAB AP GYN INTERPRETATION: NORMAL
BKR LAB AP HPV REFLEX: NORMAL
BKR LAB AP PREVIOUS ABNORMAL: NORMAL
PATH REPORT.COMMENTS IMP SPEC: NORMAL
PATH REPORT.COMMENTS IMP SPEC: NORMAL
PATH REPORT.RELEVANT HX SPEC: NORMAL

## 2023-03-06 ENCOUNTER — HOSPITAL ENCOUNTER (OUTPATIENT)
Dept: PHYSICAL THERAPY | Facility: OTHER | Age: 33
Setting detail: THERAPIES SERIES
Discharge: HOME | End: 2023-03-06
Attending: FAMILY MEDICINE
Payer: COMMERCIAL

## 2023-03-06 PROCEDURE — 97110 THERAPEUTIC EXERCISES: CPT | Mod: GP,PN

## 2023-03-07 LAB
HUMAN PAPILLOMA VIRUS 16 DNA: NEGATIVE
HUMAN PAPILLOMA VIRUS 18 DNA: NEGATIVE
HUMAN PAPILLOMA VIRUS FINAL DIAGNOSIS: NORMAL
HUMAN PAPILLOMA VIRUS OTHER HR: NEGATIVE

## 2023-03-10 ENCOUNTER — HOSPITAL ENCOUNTER (OUTPATIENT)
Dept: PHYSICAL THERAPY | Facility: OTHER | Age: 33
Setting detail: THERAPIES SERIES
Discharge: HOME | End: 2023-03-10
Attending: FAMILY MEDICINE
Payer: COMMERCIAL

## 2023-03-10 PROCEDURE — 97110 THERAPEUTIC EXERCISES: CPT | Mod: GP,PN

## 2023-03-31 ENCOUNTER — HOSPITAL ENCOUNTER (OUTPATIENT)
Dept: PHYSICAL THERAPY | Facility: OTHER | Age: 33
Setting detail: THERAPIES SERIES
Discharge: HOME | End: 2023-03-31
Attending: FAMILY MEDICINE
Payer: COMMERCIAL

## 2023-03-31 PROCEDURE — 97110 THERAPEUTIC EXERCISES: CPT | Mod: GP,PN

## 2023-03-31 PROCEDURE — 97140 MANUAL THERAPY 1/> REGIONS: CPT | Mod: GP,PN

## 2023-04-24 ENCOUNTER — HOSPITAL ENCOUNTER (OUTPATIENT)
Dept: ULTRASOUND IMAGING | Facility: OTHER | Age: 33
Discharge: HOME | End: 2023-04-24
Payer: COMMERCIAL

## 2023-04-24 DIAGNOSIS — R10.2 PELVIC PAIN IN FEMALE: ICD-10-CM

## 2023-04-24 PROCEDURE — 76856 US EXAM PELVIC COMPLETE: CPT

## 2023-05-05 ENCOUNTER — HOSPITAL ENCOUNTER (OUTPATIENT)
Dept: PHYSICAL THERAPY | Facility: OTHER | Age: 33
Setting detail: THERAPIES SERIES
Discharge: HOME | End: 2023-05-05
Attending: FAMILY MEDICINE
Payer: COMMERCIAL

## 2023-05-05 PROCEDURE — 97140 MANUAL THERAPY 1/> REGIONS: CPT | Mod: GP,PN

## 2023-05-12 ENCOUNTER — HOSPITAL ENCOUNTER (OUTPATIENT)
Dept: PHYSICAL THERAPY | Facility: OTHER | Age: 33
Setting detail: THERAPIES SERIES
Discharge: HOME | End: 2023-05-12
Attending: FAMILY MEDICINE
Payer: COMMERCIAL

## 2023-05-12 PROCEDURE — 97140 MANUAL THERAPY 1/> REGIONS: CPT | Mod: GP,PN

## 2023-05-24 ENCOUNTER — THERAPY VISIT (OUTPATIENT)
Dept: PHYSICAL THERAPY | Facility: OTHER | Age: 33
End: 2023-05-24
Attending: FAMILY MEDICINE
Payer: COMMERCIAL

## 2023-05-24 DIAGNOSIS — G89.29 CHRONIC MIDLINE THORACIC BACK PAIN: Primary | ICD-10-CM

## 2023-05-24 DIAGNOSIS — M54.6 CHRONIC MIDLINE THORACIC BACK PAIN: Primary | ICD-10-CM

## 2023-05-24 PROCEDURE — 97140 MANUAL THERAPY 1/> REGIONS: CPT | Mod: GP,PN

## 2023-06-14 ENCOUNTER — TELEPHONE (OUTPATIENT)
Dept: LAB | Facility: OTHER | Age: 33
End: 2023-06-14
Payer: COMMERCIAL

## 2023-06-14 NOTE — TELEPHONE ENCOUNTER
She stated her labs were suppose to be done at [facility]. She stated to cancel her lab appointment here.  Indiana Gutiérrez LPN..................6/14/2023   1:04 PM

## 2023-06-14 NOTE — TELEPHONE ENCOUNTER
PCP is out of office until 6/26/23, left message for patient to call back for more information-is she needing specific orders-reviewed last OV but did not find anything.  [phone]  Rosaline Barksdale LPN...................6/14/2023   12:02 PM

## 2023-06-21 ENCOUNTER — THERAPY VISIT (OUTPATIENT)
Dept: PHYSICAL THERAPY | Facility: OTHER | Age: 33
End: 2023-06-21
Attending: FAMILY MEDICINE
Payer: COMMERCIAL

## 2023-06-21 DIAGNOSIS — M54.6 CHRONIC MIDLINE THORACIC BACK PAIN: Primary | ICD-10-CM

## 2023-06-21 DIAGNOSIS — G89.29 CHRONIC MIDLINE THORACIC BACK PAIN: Primary | ICD-10-CM

## 2023-06-21 PROCEDURE — 97110 THERAPEUTIC EXERCISES: CPT | Mod: GP,PN

## 2023-06-21 PROCEDURE — 97140 MANUAL THERAPY 1/> REGIONS: CPT | Mod: GP,PN

## 2023-06-27 NOTE — PROGRESS NOTES
05/24/23 1120   Appointment Info   Signing clinician's name / credentials Zehra Rinaldi, DPT   Visits Used 8   Medical Diagnosis Chronic midline thoracic back pain (M54.6, G89.29)   PT Tx Diagnosis Patient presents with thoracic hypomobility, muscle tension and cervical pain leading to pain in cervical and thoracic regions interfering with daily tasks.   Subjective Report   Subjective Report Doing well. Symptoms continues to improve.   Treatment Interventions (PT)   Interventions Manual Therapy   Manual Therapy   Manual Therapy: Mobilization, MFR, MLD, friction massage minutes (03024) 40   Skilled Intervention Skilled manual techniques   Manual Therapy 1 - Details Assessment of cervical region revealed CTJ hypomobility, thoracic hypomobility, muscle tension at UT and levator scap. Seated IASTM G-2 strumming and sweeping to UT. Strumming with G-3 to UT myofascial lesion and trigger point.  Performed STM to UT , levator scap. Patient supine STm to anterior SCM, scalenes and gentle STM at scar tissue/scar line, suboccipital release and manual cervical traction.   PT Developmental Testing   Assessments Completed Patient responding well.   Plan   Home program HEP: Prone prop with cervical retraction, supine cervical retraction, extension with headshake, seated levator scap with overpressure. 3x/week. Optional:cat/cow.   Plan for next session Monitor response. Continue POC.   Total Session Time   Timed Code Treatment Minutes 40   Total Treatment Time (sum of timed and untimed services) 40   Medicare Claim Information   Medical Diagnosis Chronic midline thoracic back pain (M54.6, G89.29)   PT Diagnosis Patient presents with thoracic hypomobility, muscle tension and cervical pain leading to pain in cervical and thoracic regions interfering with daily tasks.   Ortho Goal 1   Goal Identifier STG 1 - HEP   Goal Description Patient will be independent and compliant with HEP.   Target Date 03/16/23   Session Number    Additional Session Number 8 of 10   Ortho Goal 2   Goal Identifier LTG 1 - Pain symptoms   Goal Description Patient will report 70% or greater reduction in pain severity and frequency in thoracic and cervical regions.   Target Date 05/24/23   Ortho Goal 3   Goal Identifier LTG 2 - Thoracic and Cervical Mobility   Goal Description Patient will display full and painfree cervical and thoracic motion with good segmental mobility.   Target Date 05/24/23   Ortho Goal 4   Goal Identifier LTG -3 Driving   Goal Description Patient will be able to perform essential work tasks of driving with use of postural support as needed without limitation from pain during or after.   Target Date 05/24/23     Assessment: Patient has been making progress with symptoms when she is able to get to exercises frequently. When she is not able to tension returns. PT sessions help with tissue health and progress status toward long term goals.       [facility]                                                                                   OUTPATIENT PHYSICAL THERAPY    PLAN OF TREATMENT FOR OUTPATIENT REHABILITATION   Patient's Last Name, First Name, Ysabel Garcia YOB: 1990   Provider's Name   [facility]   Medical Record No.  [id number]     Onset Date:   increased several months Start of Care Date:  2/23/23     Medical Diagnosis:  Chronic midline thoracic back pain (M54.6, G89.29)      PT Treatment Diagnosis:  Patient presents with thoracic hypomobility, muscle tension and cervical pain leading to pain in cervical and thoracic regions interfering with daily tasks. Plan of Treatment  Frequency/Duration: 1 x/week /  90 days    Certification date from 5/24/23   to    8/23/23       See note for plan of treatment details and functional goals      HENOK VILLA PT                         I CERTIFY THE NEED FOR THESE SERVICES FURNISHED UNDER        THIS  PLAN OF TREATMENT AND WHILE UNDER MY CARE     (Physician attestation of this document indicates review and certification of the therapy plan).                Referring Provider:  Zay Carlos V      Initial Assessment  See Epic Evaluation-

## 2023-06-29 ENCOUNTER — THERAPY VISIT (OUTPATIENT)
Dept: PHYSICAL THERAPY | Facility: OTHER | Age: 33
End: 2023-06-29
Attending: FAMILY MEDICINE
Payer: COMMERCIAL

## 2023-06-29 DIAGNOSIS — G89.29 CHRONIC MIDLINE THORACIC BACK PAIN: Primary | ICD-10-CM

## 2023-06-29 DIAGNOSIS — M54.6 CHRONIC MIDLINE THORACIC BACK PAIN: Primary | ICD-10-CM

## 2023-06-29 PROCEDURE — 97110 THERAPEUTIC EXERCISES: CPT | Mod: GP,PN

## 2023-06-29 PROCEDURE — 97140 MANUAL THERAPY 1/> REGIONS: CPT | Mod: GP,PN

## 2023-07-18 ENCOUNTER — MYC REFILL (OUTPATIENT)
Dept: FAMILY MEDICINE | Facility: OTHER | Age: 33
End: 2023-07-18
Payer: COMMERCIAL

## 2023-07-18 DIAGNOSIS — F90.9 ATTENTION DEFICIT HYPERACTIVITY DISORDER (ADHD), UNSPECIFIED ADHD TYPE: ICD-10-CM

## 2023-07-20 NOTE — TELEPHONE ENCOUNTER
Call patient - due for 6 month medication follow up with Zay Carlos MD  Prescription routed to him for his review upon return.  Liliana Rosas MD

## 2023-07-21 NOTE — TELEPHONE ENCOUNTER
Patient called, verified her last name and , and requested status. Pt informed of note below. Renae Munoz RN .............. 2023  4:24 PM

## 2023-07-24 RX ORDER — DEXTROAMPHETAMINE SACCHARATE, AMPHETAMINE ASPARTATE, DEXTROAMPHETAMINE SULFATE AND AMPHETAMINE SULFATE 7.5; 7.5; 7.5; 7.5 MG/1; MG/1; MG/1; MG/1
30 TABLET ORAL DAILY
Qty: 30 TABLET | Refills: 0 | Status: SHIPPED | OUTPATIENT
Start: 2023-07-24 | End: 2023-08-18

## 2023-07-24 RX ORDER — DEXTROAMPHETAMINE SACCHARATE, AMPHETAMINE ASPARTATE MONOHYDRATE, DEXTROAMPHETAMINE SULFATE AND AMPHETAMINE SULFATE 7.5; 7.5; 7.5; 7.5 MG/1; MG/1; MG/1; MG/1
30 CAPSULE, EXTENDED RELEASE ORAL DAILY
Qty: 30 CAPSULE | Refills: 0 | Status: ON HOLD | OUTPATIENT
Start: 2023-07-24 | End: 2023-12-06

## 2023-07-24 NOTE — TELEPHONE ENCOUNTER
Due for follow up.  Prescription sent for one month till she can get in.  Zay Carlos MD on 7/24/2023 at 6:04 PM

## 2023-08-07 ENCOUNTER — THERAPY VISIT (OUTPATIENT)
Dept: PHYSICAL THERAPY | Facility: OTHER | Age: 33
End: 2023-08-07
Attending: FAMILY MEDICINE
Payer: COMMERCIAL

## 2023-08-07 DIAGNOSIS — M54.6 CHRONIC MIDLINE THORACIC BACK PAIN: Primary | ICD-10-CM

## 2023-08-07 DIAGNOSIS — G89.29 CHRONIC MIDLINE THORACIC BACK PAIN: Primary | ICD-10-CM

## 2023-08-07 PROCEDURE — 97140 MANUAL THERAPY 1/> REGIONS: CPT | Mod: GP,PN

## 2023-08-18 DIAGNOSIS — F90.9 ATTENTION DEFICIT HYPERACTIVITY DISORDER (ADHD), UNSPECIFIED ADHD TYPE: ICD-10-CM

## 2023-08-21 NOTE — TELEPHONE ENCOUNTER
Routing refill request to provider for review/approval because:    LOV:1/31/23    Called and spoke with patient and patient states she is out of the regular Adderall 30 mg but has a full RX of the 24 hour one   Patient states she is not able to come in until the end of September or beginning of October but can do a phone visit if okay by Dr. Carlos.      Angelia Gerer RN on 8/21/2023 at 3:00 PM

## 2023-08-22 RX ORDER — DEXTROAMPHETAMINE SACCHARATE, AMPHETAMINE ASPARTATE, DEXTROAMPHETAMINE SULFATE AND AMPHETAMINE SULFATE 7.5; 7.5; 7.5; 7.5 MG/1; MG/1; MG/1; MG/1
30 TABLET ORAL DAILY
Qty: 30 TABLET | Refills: 0 | Status: ON HOLD | OUTPATIENT
Start: 2023-08-22 | End: 2023-12-06

## 2023-10-18 DIAGNOSIS — G47.00 INSOMNIA, UNSPECIFIED TYPE: ICD-10-CM

## 2023-10-18 NOTE — TELEPHONE ENCOUNTER
[id number] [place] sent Rx request for the following:      Requested Prescriptions   Pending Prescriptions Disp Refills    QUEtiapine (SEROQUEL) 200 MG tablet [Pharmacy Med Name: QUEtiapine Fumarate 200 MG Oral Tablet] 30 tablet 0     Sig: TAKE 1 TABLET BY MOUTH AT BEDTIME   Last Prescription Date:   10/3/22  Last Fill Qty/Refills:         30, R-11      Antipsychotic Medications Failed - 10/18/2023 11:36 AM        Failed - Blood pressure under 140/90 in past 12 months     BP Readings from Last 3 Encounters:   02/27/23 (!) 140/82   01/31/23 128/78   01/23/23 138/84           Failed - Lipid panel on file within the past 12 months     No lab results found.       QUEtiapine (SEROQUEL) 50 MG tablet [Pharmacy Med Name: QUEtiapine Fumarate 50 MG Oral Tablet] 40 tablet 0     Sig: TAKE 3 TO 4 TABLETS BY MOUTH EVERY DAY AT BEDTIME   Last Prescription Date:   10/3/22  Last Fill Qty/Refills:         40, R-11      Antipsychotic Medications Failed - 10/18/2023 11:36 AM        Failed - Blood pressure under 140/90 in past 12 months     BP Readings from Last 3 Encounters:   02/27/23 (!) 140/82   01/31/23 128/78   01/23/23 138/84           Failed - Lipid panel on file within the past 12 months     No lab results found.     Last Office Visit:              1/31/23   Future Office visit:             Next 5 appointments (look out 90 days)      Nov 02, 2023  8:20 AM  Office Visit with Zya VAUGHN MD  [facility] ([facility] ) [address]  [place] [address]  [phone]          Unable to complete prescription refill per RN Medication Refill Policy.     Renae Munoz RN .............. 10/18/2023  11:38 AM

## 2023-10-19 RX ORDER — QUETIAPINE FUMARATE 50 MG/1
TABLET, FILM COATED ORAL
Qty: 120 TABLET | Refills: 0 | Status: SHIPPED | OUTPATIENT
Start: 2023-10-19 | End: 2023-11-02

## 2023-10-19 RX ORDER — QUETIAPINE FUMARATE 200 MG/1
TABLET, FILM COATED ORAL
Qty: 90 TABLET | Refills: 0 | Status: SHIPPED | OUTPATIENT
Start: 2023-10-19 | End: 2023-11-02

## 2023-11-02 ENCOUNTER — OFFICE VISIT (OUTPATIENT)
Dept: FAMILY MEDICINE | Facility: OTHER | Age: 33
End: 2023-11-02
Attending: FAMILY MEDICINE
Payer: MEDICAID

## 2023-11-02 VITALS
OXYGEN SATURATION: 100 % | DIASTOLIC BLOOD PRESSURE: 70 MMHG | BODY MASS INDEX: 28.79 KG/M2 | HEIGHT: 68 IN | TEMPERATURE: 97 F | SYSTOLIC BLOOD PRESSURE: 114 MMHG | WEIGHT: 190 LBS | HEART RATE: 80 BPM | RESPIRATION RATE: 20 BRPM

## 2023-11-02 DIAGNOSIS — M25.50 MULTIPLE JOINT PAIN: Primary | ICD-10-CM

## 2023-11-02 DIAGNOSIS — G47.00 INSOMNIA, UNSPECIFIED TYPE: ICD-10-CM
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
## 2023-11-02 DIAGNOSIS — F90.9 ATTENTION DEFICIT HYPERACTIVITY DISORDER (ADHD), UNSPECIFIED ADHD TYPE: ICD-10-CM

## 2023-11-02 DIAGNOSIS — E53.8 VITAMIN B12 DEFICIENCY (NON ANEMIC): ICD-10-CM

## 2023-11-02 PROCEDURE — G0463 HOSPITAL OUTPT CLINIC VISIT: HCPCS | Performed by: FAMILY MEDICINE

## 2023-11-02 PROCEDURE — 99214 OFFICE O/P EST MOD 30 MIN: CPT | Performed by: FAMILY MEDICINE

## 2023-11-02 RX ORDER — DEXTROAMPHETAMINE SACCHARATE, AMPHETAMINE ASPARTATE MONOHYDRATE, DEXTROAMPHETAMINE SULFATE AND AMPHETAMINE SULFATE 7.5; 7.5; 7.5; 7.5 MG/1; MG/1; MG/1; MG/1
30 CAPSULE, EXTENDED RELEASE ORAL DAILY
Qty: 30 CAPSULE | Refills: 0 | Status: CANCELLED | OUTPATIENT
Start: 2023-11-02

## 2023-11-02 RX ORDER — DEXTROAMPHETAMINE SACCHARATE, AMPHETAMINE ASPARTATE MONOHYDRATE, DEXTROAMPHETAMINE SULFATE AND AMPHETAMINE SULFATE 7.5; 7.5; 7.5; 7.5 MG/1; MG/1; MG/1; MG/1
30 CAPSULE, EXTENDED RELEASE ORAL DAILY
Qty: 30 CAPSULE | Refills: 0 | Status: ON HOLD | OUTPATIENT
Start: 2023-12-03 | End: 2023-12-07

## 2023-11-02 RX ORDER — DEXTROAMPHETAMINE SACCHARATE, AMPHETAMINE ASPARTATE MONOHYDRATE, DEXTROAMPHETAMINE SULFATE AND AMPHETAMINE SULFATE 7.5; 7.5; 7.5; 7.5 MG/1; MG/1; MG/1; MG/1
30 CAPSULE, EXTENDED RELEASE ORAL DAILY
Qty: 30 CAPSULE | Refills: 0 | Status: SHIPPED | OUTPATIENT
Start: 2023-11-02 | End: 2023-12-02

## 2023-11-02 RX ORDER — DEXTROAMPHETAMINE SACCHARATE, AMPHETAMINE ASPARTATE MONOHYDRATE, DEXTROAMPHETAMINE SULFATE AND AMPHETAMINE SULFATE 7.5; 7.5; 7.5; 7.5 MG/1; MG/1; MG/1; MG/1
30 CAPSULE, EXTENDED RELEASE ORAL DAILY
Qty: 30 CAPSULE | Refills: 0 | Status: SHIPPED | OUTPATIENT
Start: 2024-01-03 | End: 2024-02-02

## 2023-11-02 RX ORDER — DEXTROAMPHETAMINE SACCHARATE, AMPHETAMINE ASPARTATE, DEXTROAMPHETAMINE SULFATE AND AMPHETAMINE SULFATE 7.5; 7.5; 7.5; 7.5 MG/1; MG/1; MG/1; MG/1
30 TABLET ORAL DAILY
Qty: 30 TABLET | Refills: 0 | Status: ON HOLD | OUTPATIENT
Start: 2024-01-03 | End: 2023-12-07

## 2023-11-02 RX ORDER — QUETIAPINE FUMARATE 50 MG/1
TABLET, FILM COATED ORAL
Qty: 120 TABLET | Refills: 11 | Status: ON HOLD | OUTPATIENT
Start: 2023-11-02 | End: 2023-12-07

## 2023-11-02 RX ORDER — ERGOCALCIFEROL (VITAMIN D2) 50 MCG
2 CAPSULE ORAL DAILY
COMMUNITY
Start: 2023-09-25

## 2023-11-02 RX ORDER — DEXTROAMPHETAMINE SACCHARATE, AMPHETAMINE ASPARTATE, DEXTROAMPHETAMINE SULFATE AND AMPHETAMINE SULFATE 7.5; 7.5; 7.5; 7.5 MG/1; MG/1; MG/1; MG/1
30 TABLET ORAL DAILY
Qty: 30 TABLET | Refills: 0 | Status: ON HOLD | OUTPATIENT
Start: 2023-12-03 | End: 2023-12-07

## 2023-11-02 RX ORDER — DEXTROAMPHETAMINE SACCHARATE, AMPHETAMINE ASPARTATE, DEXTROAMPHETAMINE SULFATE AND AMPHETAMINE SULFATE 7.5; 7.5; 7.5; 7.5 MG/1; MG/1; MG/1; MG/1
30 TABLET ORAL DAILY
Qty: 30 TABLET | Refills: 0 | Status: SHIPPED | OUTPATIENT
Start: 2023-11-02 | End: 2023-12-02

## 2023-11-02 RX ORDER — DEXTROAMPHETAMINE SACCHARATE, AMPHETAMINE ASPARTATE, DEXTROAMPHETAMINE SULFATE AND AMPHETAMINE SULFATE 7.5; 7.5; 7.5; 7.5 MG/1; MG/1; MG/1; MG/1
30 TABLET ORAL DAILY
Qty: 30 TABLET | Refills: 0 | Status: CANCELLED | OUTPATIENT
Start: 2023-11-02

## 2023-11-02 RX ORDER — QUETIAPINE FUMARATE 200 MG/1
TABLET, FILM COATED ORAL
Qty: 90 TABLET | Refills: 11 | Status: ON HOLD | OUTPATIENT
Start: 2023-11-02 | End: 2023-12-07

## 2023-11-02 ASSESSMENT — PATIENT HEALTH QUESTIONNAIRE - PHQ9
SUM OF ALL RESPONSES TO PHQ QUESTIONS 1-9: 17
SUM OF ALL RESPONSES TO PHQ QUESTIONS 1-9: 17
10. IF YOU CHECKED OFF ANY PROBLEMS, HOW DIFFICULT HAVE THESE PROBLEMS MADE IT FOR YOU TO DO YOUR WORK, TAKE CARE OF THINGS AT HOME, OR GET ALONG WITH OTHER PEOPLE: VERY DIFFICULT

## 2023-11-02 ASSESSMENT — PAIN SCALES - GENERAL: PAINLEVEL: NO PAIN (0)

## 2023-11-02 NOTE — PROGRESS NOTES
"Nursing Notes:   Claudia Garcia LPN  11/2/2023  8:37 AM  Sign at exiting of workspace  Patient presents to the clinic for multiple concerns.    FOOD SECURITY SCREENING QUESTIONS:    The next two questions are to help us understand your food security.  If you are feeling you need any assistance in this area, we have resources available to support you today.    Hunger Vital Signs:  Within the past 12 months we worried whether our food would run out before we got money to buy more. Never  Within the past 12 months the food we bought just didn't last and we didn't have money to get more. Never    Advance Care Directive on file? no  Advance Care Directive provided to patient? yes    Chief Complaint   Patient presents with    Recheck Medication       Initial /70 (BP Location: Right arm, Patient Position: Sitting, Cuff Size: Adult Regular)   Pulse 80   Temp 97  F (36.1  C) (Tympanic)   Resp 20   Ht 1.727 m (5' 8\")   Wt 86.2 kg (190 lb)   LMP 10/31/2023 (Within Days)   SpO2 100%   BMI 28.89 kg/m   Estimated body mass index is 28.89 kg/m  as calculated from the following:    Height as of this encounter: 1.727 m (5' 8\").    Weight as of this encounter: 86.2 kg (190 lb).  Medication Reconciliation: complete        Claudia Garcia LPN     SUBJECTIVE:  Ysabel David  is a 33 year old female who comes in today for several concerns.    She has had some swollen nodes in her neck. They don't hurt.  Mostly just under her ears.  No dental issues. No piercings, bites, hair color treatments.      She feels that she has been more emotional of late. Not cyclical. She is more easily overwhelmed. That has gone on for the last 2 months. She has had normal periods and negative pregnancy tests.  She has not had anything new as far as stressors. No relationship issues.  Some financial stressors but nothing new. 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  She has felt more out of balance.     She has " always had a lot of sweat, but she feels that there is more odor.  She has had some breast tenderness.  She has had less sex drive and difficulty getting to orgasm.  She has more cold and heat intolerance, more joint pains.  She will have some left sided facial pressure over her left eyebrow and some neck stiffness. It can get better with massage and was better with PT.     She has been out of Adderall.        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  Margins were negative and lymph nodes were negative.  She is on TSH suppression and had some side effects from that with weight loss..  She had low phosphate due to low vitamin D level and has been taking that at high dose now for couple months and will be switching to 2000 units daily thereafter.    She has been without insurance for a few months.     Past Medical, Family, and Social History reviewed and updated as noted below.   ROS is negative except as noted above       No Known Allergies,   Family History   Problem Relation Age of Onset    Breast Cancer Maternal Grandmother         Cancer-breast    Other - See Comments Brother         Psychiatric illness,Severe depression    Colon Cancer No family hx of         Cancer-colon    Prostate Cancer No family hx of         Cancer-prostate    Ovarian Cancer No  family hx of         Cancer-ovarian    Blood Disease No family hx of         Blood Disease    Asthma No family hx of         Asthma    Heart Disease No family hx of         Heart Disease    Diabetes No family hx of         Diabetes    Hypertension No family hx of         Hypertension    Seizure Disorder No family hx of         Seizures    Thyroid Disease No family hx of         Thyroid Disease   ,   Current Outpatient Medications   Medication    amphetamine-dextroamphetamine (ADDERALL XR) 30 MG 24 hr capsule    [START ON 12/3/2023] amphetamine-dextroamphetamine (ADDERALL XR) 30 MG 24 hr capsule    [START ON 1/3/2024] amphetamine-dextroamphetamine (ADDERALL XR) 30 MG 24 hr capsule    amphetamine-dextroamphetamine (ADDERALL) 30 MG tablet    [START ON 12/3/2023] amphetamine-dextroamphetamine (ADDERALL) 30 MG tablet    [START ON 1/3/2024] amphetamine-dextroamphetamine (ADDERALL) 30 MG tablet    QUEtiapine (SEROQUEL) 200 MG tablet    QUEtiapine (SEROQUEL) 50 MG tablet    vitamin D2 (ERGOCALCIFEROL) 30967 units (1250 mcg) capsule    amphetamine-dextroamphetamine (ADDERALL XR) 30 MG 24 hr capsule    amphetamine-dextroamphetamine (ADDERALL) 30 MG tablet    etonogestrel (NEXPLANON) 68 MG IMPL    levothyroxine (SYNTHROID/LEVOTHROID) 175 MCG tablet     No current facility-administered medications for this visit.   ,   Past Medical History:   Diagnosis Date    Borderline personality disorder (H)     ?    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) 07/06/2020     Priority: Medium    Stress due to family tension 07/06/2020     Priority: Medium    Postoperative state 06/21/2017     Priority: Medium    Headache 03/01/2011     Priority: Medium    Insomnia 03/01/2011     Priority: Medium    Other syndromes affecting cervical region 03/01/2011     Priority: Medium   ,   Past Surgical History:   Procedure Laterality Date    APPENDECTOMY OPEN      06/14/2017,Lap Appy    and   Social History     Tobacco Use    Smoking status: Never    Smokeless tobacco: Never   Substance Use Topics    Alcohol use: Yes     Comment: Alcoholic Drinks/day: socially     OBJECTIVE:  /70 (BP Location: Right arm, Patient Position: Sitting, Cuff Size: Adult Regular)   Pulse 80   Temp 97  F (36.1  C) (Tympanic)   Resp 20   Ht 1.727 m (5' 8\")   Wt 86.2 kg (190 lb)   LMP 10/31/2023 (Within Days)   SpO2 100%   BMI 28.89 kg/m     EXAM:  General Appearance: Pleasant, alert, appropriate appearance for age. No acute distress  Head Exam: Normal. Normocephalic, atraumatic.  Eye Exam: PERRLA, EOMI, conjunctivae, sclerae normal.  Ear Exam: Normal TM's bilaterally. Normal auditory canals and external ears. Non-tender.  Nose Exam: Normal external nose, mucus membranes, and septum.  OroPharynx Exam:  Dental hygiene adequate. Normal buccal mucosa. Normal pharynx.  Neck Exam:  Supple, no masses or nodes. No bruits  Thyroid Exam: No nodules or enlargement.  Chest/Respiratory Exam: Normal chest wall and respirations. Clear to auscultation.  Cardiovascular Exam: Regular rate and rhythm. S1, S2, no murmur, click, gallop, or rubs.  Gastrointestinal Exam: Soft, non-tender, no masses or organomegaly.  Lymphatic Exam: Non-palpable nodes in neck,clavicular, axillary, or inguinal regions.  Small nontender postauricular nodes that did not really appear to be enlarged.  Musculoskeletal Exam: Back is straight and non-tender, full ROM of upper and lower extremities.  Foot Exam: Left and right foot: " good pedal pulses  Skin: no rash or abnormalities  Neurologic Exam:  normal gross motor, tone coordination and no tremor.  Psychiatric Exam: Alert and oriented - appropriate affect.  ASSESSMENT/Plan :    Kaila was seen today for recheck medication.    Diagnoses and all orders for this visit:    Multiple joint pain  -     CBC with Platelets & Differential; Future  -     Comprehensive metabolic panel; Future  -     Sedimentation Rate (ESR); Future  -     CRP inflammation; Future  -     Rheumatoid factor; Future  -     Anti Nuclear Viktoria IgG by IFA with Reflex; Future  -     Cyclic Citrullinated Peptide Antibody IgG; Future    Attention deficit hyperactivity disorder (ADHD), unspecified ADHD type  -     amphetamine-dextroamphetamine (ADDERALL XR) 30 MG 24 hr capsule; Take 1 capsule (30 mg) by mouth daily for 30 days  -     amphetamine-dextroamphetamine (ADDERALL XR) 30 MG 24 hr capsule; Take 1 capsule (30 mg) by mouth daily for 30 days  -     amphetamine-dextroamphetamine (ADDERALL XR) 30 MG 24 hr capsule; Take 1 capsule (30 mg) by mouth daily for 30 days  -     amphetamine-dextroamphetamine (ADDERALL) 30 MG tablet; Take 1 tablet (30 mg) by mouth daily for 30 days  -     amphetamine-dextroamphetamine (ADDERALL) 30 MG tablet; Take 1 tablet (30 mg) by mouth daily for 30 days  -     amphetamine-dextroamphetamine (ADDERALL) 30 MG tablet; Take 1 tablet (30 mg) by mouth daily for 30 days    Insomnia, unspecified type  -     QUEtiapine (SEROQUEL) 200 MG tablet; TAKE 1 TABLET BY MOUTH AT BEDTIME  -     QUEtiapine (SEROQUEL) 50 MG tablet; TAKE 3 to 4 TABLETS BY MOUTH EVERY DAY AT BEDTIME    Vitamin B12 deficiency (non anemic)  -     Vitamin B12; Future    Tick bite, unspecified site, initial encounter  -     Lyme Disease Total Abs Bld with Reflex to Confirm CLIA; Future      Lengthy discussion with regard to her symptoms.  Some of this may be due to adjustment of her thyroid.  Certainly some of it could be hormonal but it is  less likely as its not cyclical.  She has been off of her Adderall for a while and that might help to stabilize things.  I do think would be reasonable to check for other autoimmune things but she currently does not have insurance we put the labs in and if she qualifies for insurance then she can certainly come back for that we will let her know the results.  I do not really think she has an acute arthritis going on today on exam but will check for those labs in addition to vitamin deficiencies and Lyme disease.  Support encouragement offered and she was reassured that I thought at this point there was anything sinister going on.    A total of 42 minutes was spent with the patient, reviewing records, tests, ordering medications, tests or procedures and documenting clinical information in the EHR .     Zay Carlos MD

## 2023-11-02 NOTE — NURSING NOTE
"Patient presents to the clinic for multiple concerns.    FOOD SECURITY SCREENING QUESTIONS:    The next two questions are to help us understand your food security.  If you are feeling you need any assistance in this area, we have resources available to support you today.    Hunger Vital Signs:  Within the past 12 months we worried whether our food would run out before we got money to buy more. Never  Within the past 12 months the food we bought just didn't last and we didn't have money to get more. Never    Advance Care Directive on file? no  Advance Care Directive provided to patient? yes    Chief Complaint   Patient presents with    Recheck Medication       Initial /70 (BP Location: Right arm, Patient Position: Sitting, Cuff Size: Adult Regular)   Pulse 80   Temp 97  F (36.1  C) (Tympanic)   Resp 20   Ht 1.727 m (5' 8\")   Wt 86.2 kg (190 lb)   LMP 10/31/2023 (Within Days)   SpO2 100%   BMI 28.89 kg/m   Estimated body mass index is 28.89 kg/m  as calculated from the following:    Height as of this encounter: 1.727 m (5' 8\").    Weight as of this encounter: 86.2 kg (190 lb).  Medication Reconciliation: complete        Claudia Garcia LPN     "

## 2023-11-03 ENCOUNTER — LAB (OUTPATIENT)
Dept: LAB | Facility: OTHER | Age: 33
End: 2023-11-03
Attending: FAMILY MEDICINE
Payer: MEDICAID

## 2023-11-03 DIAGNOSIS — M25.50 MULTIPLE JOINT PAIN: ICD-10-CM

## 2023-11-03 DIAGNOSIS — E53.8 VITAMIN B12 DEFICIENCY (NON ANEMIC): ICD-10-CM
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
## 2023-11-03 LAB
ALBUMIN SERPL BCG-MCNC: 4.5 G/DL (ref 3.5–5.2)
ALP SERPL-CCNC: 69 U/L (ref 35–104)
ALT SERPL W P-5'-P-CCNC: 18 U/L (ref 0–50)
ANION GAP SERPL CALCULATED.3IONS-SCNC: 9 MMOL/L (ref 7–15)
AST SERPL W P-5'-P-CCNC: 16 U/L (ref 0–45)
BASOPHILS # BLD AUTO: 0 10E3/UL (ref 0–0.2)
BASOPHILS NFR BLD AUTO: 1 %
BILIRUB SERPL-MCNC: 0.5 MG/DL
BUN SERPL-MCNC: 10.4 MG/DL (ref 6–20)
CALCIUM SERPL-MCNC: 9.5 MG/DL (ref 8.6–10)
CHLORIDE SERPL-SCNC: 102 MMOL/L (ref 98–107)
CREAT SERPL-MCNC: 0.86 MG/DL (ref 0.51–0.95)
CRP SERPL-MCNC: <3 MG/L
DEPRECATED HCO3 PLAS-SCNC: 25 MMOL/L (ref 22–29)
EGFRCR SERPLBLD CKD-EPI 2021: >90 ML/MIN/1.73M2
EOSINOPHIL # BLD AUTO: 0.1 10E3/UL (ref 0–0.7)
EOSINOPHIL NFR BLD AUTO: 2 %
ERYTHROCYTE [DISTWIDTH] IN BLOOD BY AUTOMATED COUNT: 11.3 % (ref 10–15)
ERYTHROCYTE [SEDIMENTATION RATE] IN BLOOD BY WESTERGREN METHOD: 1 MM/HR (ref 0–20)
GLUCOSE SERPL-MCNC: 95 MG/DL (ref 70–99)
HCT VFR BLD AUTO: 42.5 % (ref 35–47)
HGB BLD-MCNC: 14.8 G/DL (ref 11.7–15.7)
IMM GRANULOCYTES # BLD: 0 10E3/UL
IMM GRANULOCYTES NFR BLD: 0 %
LYMPHOCYTES # BLD AUTO: 2.3 10E3/UL (ref 0.8–5.3)
LYMPHOCYTES NFR BLD AUTO: 43 %
MCH RBC QN AUTO: 32.4 PG (ref 26.5–33)
MCHC RBC AUTO-ENTMCNC: 34.8 G/DL (ref 31.5–36.5)
MCV RBC AUTO: 93 FL (ref 78–100)
MONOCYTES # BLD AUTO: 0.4 10E3/UL (ref 0–1.3)
MONOCYTES NFR BLD AUTO: 8 %
NEUTROPHILS # BLD AUTO: 2.5 10E3/UL (ref 1.6–8.3)
NEUTROPHILS NFR BLD AUTO: 46 %
NRBC # BLD AUTO: 0 10E3/UL
NRBC BLD AUTO-RTO: 0 /100
PLATELET # BLD AUTO: 197 10E3/UL (ref 150–450)
POTASSIUM SERPL-SCNC: 4 MMOL/L (ref 3.4–5.3)
PROT SERPL-MCNC: 7.1 G/DL (ref 6.4–8.3)
RBC # BLD AUTO: 4.57 10E6/UL (ref 3.8–5.2)
SODIUM SERPL-SCNC: 136 MMOL/L (ref 135–145)
VIT B12 SERPL-MCNC: 199 PG/ML (ref 232–1245)
WBC # BLD AUTO: 5.4 10E3/UL (ref 4–11)

## 2023-11-03 PROCEDURE — 80053 COMPREHEN METABOLIC PANEL: CPT | Mod: ZL

## 2023-11-03 PROCEDURE — 86431 RHEUMATOID FACTOR QUANT: CPT | Mod: ZL

## 2023-11-03 PROCEDURE — 86140 C-REACTIVE PROTEIN: CPT | Mod: ZL

## 2023-11-03 PROCEDURE — 85025 COMPLETE CBC W/AUTO DIFF WBC: CPT | Mod: ZL

## 2023-11-03 PROCEDURE — 85652 RBC SED RATE AUTOMATED: CPT | Mod: ZL

## 2023-11-03 PROCEDURE — 86200 CCP ANTIBODY: CPT | Mod: ZL

## 2023-11-03 PROCEDURE — 36415 COLL VENOUS BLD VENIPUNCTURE: CPT | Mod: ZL

## 2023-11-03 PROCEDURE — 86618 LYME DISEASE ANTIBODY: CPT | Mod: ZL

## 2023-11-03 PROCEDURE — 82607 VITAMIN B-12: CPT | Mod: ZL

## 2023-11-03 PROCEDURE — 86038 ANTINUCLEAR ANTIBODIES: CPT | Mod: ZL

## 2023-11-04 ENCOUNTER — MYC MEDICAL ADVICE (OUTPATIENT)
Dept: FAMILY MEDICINE | Facility: OTHER | Age: 33
End: 2023-11-04
Payer: MEDICAID

## 2023-11-04 RX ORDER — CYANOCOBALAMIN 1000 UG/ML
1000 INJECTION, SOLUTION INTRAMUSCULAR; SUBCUTANEOUS WEEKLY
Status: DISCONTINUED | OUTPATIENT
Start: 2023-11-04 | End: 2023-11-29

## 2023-11-06 ENCOUNTER — ALLIED HEALTH/NURSE VISIT (OUTPATIENT)
Dept: FAMILY MEDICINE | Facility: OTHER | Age: 33
End: 2023-11-06
Attending: FAMILY MEDICINE
Payer: MEDICAID

## 2023-11-06 DIAGNOSIS — Z23 HIGH PRIORITY FOR 2019-NCOV VACCINE: Primary | ICD-10-CM

## 2023-11-06 LAB
ANA SER QL IF: NEGATIVE
B BURGDOR IGG+IGM SER QL: 0.72
RHEUMATOID FACT SER NEPH-ACNC: <6 IU/ML

## 2023-11-06 PROCEDURE — 91320 SARSCV2 VAC 30MCG TRS-SUC IM: CPT

## 2023-11-07 LAB — CCP AB SER IA-ACNC: 1.6 U/ML

## 2023-11-09 ENCOUNTER — OFFICE VISIT (OUTPATIENT)
Dept: FAMILY MEDICINE | Facility: OTHER | Age: 33
End: 2023-11-09
Attending: FAMILY MEDICINE
Payer: MEDICAID

## 2023-11-09 VITALS
HEIGHT: 68 IN | SYSTOLIC BLOOD PRESSURE: 128 MMHG | OXYGEN SATURATION: 100 % | TEMPERATURE: 98.5 F | RESPIRATION RATE: 18 BRPM | BODY MASS INDEX: 28.95 KG/M2 | WEIGHT: 191 LBS | HEART RATE: 80 BPM | DIASTOLIC BLOOD PRESSURE: 88 MMHG

## 2023-11-09 DIAGNOSIS — J02.9 SORE THROAT: Primary | ICD-10-CM

## 2023-11-09 PROCEDURE — 99213 OFFICE O/P EST LOW 20 MIN: CPT | Performed by: FAMILY MEDICINE

## 2023-11-09 PROCEDURE — G0463 HOSPITAL OUTPT CLINIC VISIT: HCPCS

## 2023-11-09 ASSESSMENT — PAIN SCALES - GENERAL: PAINLEVEL: MILD PAIN (2)

## 2023-11-09 NOTE — NURSING NOTE
Patient is here for concerns sore throat, painful lymph nodes Started yesterday. Symptoms have improved some today.     Patient's last menstrual period was 10/31/2023 (within days).  Medication Reconciliation: complete    Veriot Mckeon LPN 11/9/2023 2:47 PM       Advance care directive on file? no  Advance care directive provided to patient? no       Verito Mckeon LPN

## 2023-11-09 NOTE — PROGRESS NOTES
"  Assessment & Plan     Sore throat  Exam is normal.  Certainly could be having mild reaction to COVID booster.  Doubt strep throat.  Recommend supportive cares.         BMI:   Estimated body mass index is 29.04 kg/m  as calculated from the following:    Height as of this encounter: 1.727 m (5' 8\").    Weight as of this encounter: 86.6 kg (191 lb).       No follow-ups on file.    Elizabeth Santamaria MD  [facility]    Zackery Bright is a 33 year old, presenting for the following health issues:  Throat Problem      HPI     34 yo female presents with 2 day history of ST. states she had tender lymph nodes yesterday but that has resolved.  Denies cough, shortness of breath, nasal congestion.  No nausea vomiting or diarrhea.    No fever    No known exposure    Recent Covid booster          Review of Systems         Objective    /88   Pulse 80   Temp 98.5  F (36.9  C) (Tympanic)   Resp 18   Ht 1.727 m (5' 8\")   Wt 86.6 kg (191 lb)   LMP 10/31/2023 (Within Days)   SpO2 100%   Breastfeeding No   BMI 29.04 kg/m    Body mass index is 29.04 kg/m .  Physical Exam  HENT:      Head: Normocephalic.      Right Ear: Tympanic membrane normal.      Left Ear: Tympanic membrane normal.      Nose: Nose normal. No congestion.      Mouth/Throat:      Mouth: Mucous membranes are moist.      Pharynx: Oropharynx is clear. No posterior oropharyngeal erythema.   Eyes:      Conjunctiva/sclera: Conjunctivae normal.   Cardiovascular:      Rate and Rhythm: Normal rate.   Pulmonary:      Effort: Pulmonary effort is normal. No respiratory distress.   Musculoskeletal:      Cervical back: Normal range of motion. No rigidity.   Lymphadenopathy:      Cervical: No cervical adenopathy.   Neurological:      Mental Status: She is alert.   Psychiatric:         Mood and Affect: Mood normal.                              "

## 2023-11-20 ENCOUNTER — TELEPHONE (OUTPATIENT)
Dept: FAMILY MEDICINE | Facility: OTHER | Age: 33
End: 2023-11-20
Payer: MEDICAID

## 2023-11-20 NOTE — TELEPHONE ENCOUNTER
Patient requests a call back regarding if the vitamin B12 injection order has been placed.    Okay to leave detailed message.      Velma Cui on 11/20/2023 at 1:36 PM

## 2023-11-22 NOTE — TELEPHONE ENCOUNTER
Message was left for patient indicating that B12 order has been placed. Phone number was left for patient to call to schedule in the injection room.     Sulema Dhillon LPN on 11/22/2023 at 9:56 AM

## 2023-11-29 DIAGNOSIS — E53.8 VITAMIN B12 DEFICIENCY (NON ANEMIC): Primary | ICD-10-CM

## 2023-11-29 RX ORDER — CYANOCOBALAMIN 1000 UG/ML
1000 INJECTION, SOLUTION INTRAMUSCULAR; SUBCUTANEOUS WEEKLY
Status: COMPLETED | OUTPATIENT
Start: 2023-12-04 | End: 2023-12-28

## 2023-11-29 RX ORDER — CYANOCOBALAMIN 1000 UG/ML
1000 INJECTION, SOLUTION INTRAMUSCULAR; SUBCUTANEOUS
Status: DISCONTINUED | OUTPATIENT
Start: 2024-01-22 | End: 2023-12-07

## 2023-12-04 ENCOUNTER — ALLIED HEALTH/NURSE VISIT (OUTPATIENT)
Dept: FAMILY MEDICINE | Facility: OTHER | Age: 33
End: 2023-12-04
Attending: FAMILY MEDICINE
Payer: COMMERCIAL

## 2023-12-04 DIAGNOSIS — E53.8 B12 DEFICIENCY: Primary | ICD-10-CM

## 2023-12-04 PROCEDURE — 250N000011 HC RX IP 250 OP 636

## 2023-12-04 PROCEDURE — 96372 THER/PROPH/DIAG INJ SC/IM: CPT

## 2023-12-04 RX ADMIN — CYANOCOBALAMIN 1000 MCG: 1000 INJECTION, SOLUTION INTRAMUSCULAR; SUBCUTANEOUS at 15:08

## 2023-12-04 NOTE — PROGRESS NOTES

## 2023-12-05 ENCOUNTER — TELEPHONE (OUTPATIENT)
Dept: BEHAVIORAL HEALTH | Facility: CLINIC | Age: 33
End: 2023-12-05
Payer: COMMERCIAL

## 2023-12-05 ENCOUNTER — HOSPITAL ENCOUNTER (EMERGENCY)
Facility: OTHER | Age: 33
Discharge: PSYCHIATRIC OR SUBSTANCE USE FACILITY | End: 2023-12-05
Attending: FAMILY MEDICINE | Admitting: FAMILY MEDICINE
Payer: COMMERCIAL

## 2023-12-05 ENCOUNTER — HOSPITAL ENCOUNTER (INPATIENT)
Facility: HOSPITAL | Age: 33
LOS: 2 days | Discharge: HOME | End: 2023-12-07
Attending: PSYCHIATRY & NEUROLOGY | Admitting: STUDENT IN AN ORGANIZED HEALTH CARE EDUCATION/TRAINING PROGRAM
Payer: COMMERCIAL

## 2023-12-05 VITALS
OXYGEN SATURATION: 100 % | SYSTOLIC BLOOD PRESSURE: 135 MMHG | TEMPERATURE: 97.4 F | WEIGHT: 191 LBS | HEART RATE: 108 BPM | RESPIRATION RATE: 18 BRPM | BODY MASS INDEX: 29.04 KG/M2 | DIASTOLIC BLOOD PRESSURE: 90 MMHG

## 2023-12-05 DIAGNOSIS — R45.851 DEPRESSION WITH SUICIDAL IDEATION: ICD-10-CM

## 2023-12-05 DIAGNOSIS — F90.9 ATTENTION DEFICIT HYPERACTIVITY DISORDER (ADHD), UNSPECIFIED ADHD TYPE: Primary | ICD-10-CM

## 2023-12-05 DIAGNOSIS — E53.8 VITAMIN B12 DEFICIENCY (NON ANEMIC): ICD-10-CM

## 2023-12-05 DIAGNOSIS — F32.A DEPRESSION WITH SUICIDAL IDEATION: ICD-10-CM

## 2023-12-05 DIAGNOSIS — Z79.899 CONTROLLED SUBSTANCE AGREEMENT SIGNED: ICD-10-CM

## 2023-12-05 DIAGNOSIS — F33.2 MAJOR DEPRESSIVE DISORDER, RECURRENT SEVERE WITHOUT PSYCHOTIC FEATURES (H): ICD-10-CM

## 2023-12-05 LAB
AMPHETAMINES UR QL SCN: ABNORMAL
BARBITURATES UR QL SCN: ABNORMAL
BENZODIAZ UR QL SCN: ABNORMAL
BZE UR QL SCN: ABNORMAL
CANNABINOIDS UR QL SCN: ABNORMAL
FENTANYL UR QL: ABNORMAL
HOLD SPECIMEN: NORMAL
OPIATES UR QL SCN: ABNORMAL
PCP QUAL URINE (ROCHE): ABNORMAL
SARS-COV-2 RNA RESP QL NAA+PROBE: NEGATIVE
T4 FREE SERPL-MCNC: 1.42 NG/DL (ref 0.9–1.7)
TSH SERPL DL<=0.005 MIU/L-ACNC: 6.55 UIU/ML (ref 0.3–4.2)

## 2023-12-05 PROCEDURE — 124N000001 HC R&B MH

## 2023-12-05 PROCEDURE — 99285 EMERGENCY DEPT VISIT HI MDM: CPT | Performed by: FAMILY MEDICINE

## 2023-12-05 PROCEDURE — 99291 CRITICAL CARE FIRST HOUR: CPT | Performed by: FAMILY MEDICINE

## 2023-12-05 PROCEDURE — C9803 HOPD COVID-19 SPEC COLLECT: HCPCS | Performed by: FAMILY MEDICINE

## 2023-12-05 PROCEDURE — 87635 SARS-COV-2 COVID-19 AMP PRB: CPT | Performed by: INTERNAL MEDICINE

## 2023-12-05 PROCEDURE — 250N000013 HC RX MED GY IP 250 OP 250 PS 637: Performed by: INTERNAL MEDICINE

## 2023-12-05 PROCEDURE — 80307 DRUG TEST PRSMV CHEM ANLYZR: CPT | Performed by: INTERNAL MEDICINE

## 2023-12-05 PROCEDURE — 36415 COLL VENOUS BLD VENIPUNCTURE: CPT | Performed by: FAMILY MEDICINE

## 2023-12-05 PROCEDURE — 84443 ASSAY THYROID STIM HORMONE: CPT | Performed by: FAMILY MEDICINE

## 2023-12-05 PROCEDURE — 84439 ASSAY OF FREE THYROXINE: CPT | Performed by: FAMILY MEDICINE

## 2023-12-05 RX ORDER — QUETIAPINE FUMARATE 100 MG/1
200 TABLET, FILM COATED ORAL AT BEDTIME
Status: DISCONTINUED | OUTPATIENT
Start: 2023-12-05 | End: 2023-12-05 | Stop reason: HOSPADM

## 2023-12-05 RX ORDER — LANOLIN ALCOHOL/MO/W.PET/CERES
3 CREAM (GRAM) TOPICAL
Status: DISCONTINUED | OUTPATIENT
Start: 2023-12-05 | End: 2023-12-07 | Stop reason: HOSPADM

## 2023-12-05 RX ORDER — OLANZAPINE 10 MG/1
10 TABLET ORAL 3 TIMES DAILY PRN
Status: DISCONTINUED | OUTPATIENT
Start: 2023-12-05 | End: 2023-12-06

## 2023-12-05 RX ORDER — HYDROXYZINE HYDROCHLORIDE 25 MG/1
25 TABLET, FILM COATED ORAL EVERY 4 HOURS PRN
Status: DISCONTINUED | OUTPATIENT
Start: 2023-12-05 | End: 2023-12-07 | Stop reason: HOSPADM

## 2023-12-05 RX ORDER — OLANZAPINE 10 MG/2ML
10 INJECTION, POWDER, FOR SOLUTION INTRAMUSCULAR 3 TIMES DAILY PRN
Status: DISCONTINUED | OUTPATIENT
Start: 2023-12-05 | End: 2023-12-06

## 2023-12-05 RX ORDER — MAGNESIUM HYDROXIDE/ALUMINUM HYDROXICE/SIMETHICONE 120; 1200; 1200 MG/30ML; MG/30ML; MG/30ML
30 SUSPENSION ORAL EVERY 4 HOURS PRN
Status: DISCONTINUED | OUTPATIENT
Start: 2023-12-05 | End: 2023-12-07 | Stop reason: HOSPADM

## 2023-12-05 RX ORDER — ACETAMINOPHEN 325 MG/1
650 TABLET ORAL EVERY 4 HOURS PRN
Status: DISCONTINUED | OUTPATIENT
Start: 2023-12-05 | End: 2023-12-07 | Stop reason: HOSPADM

## 2023-12-05 RX ADMIN — QUETIAPINE 200 MG: 100 TABLET ORAL at 21:47

## 2023-12-05 ASSESSMENT — ENCOUNTER SYMPTOMS
CONFUSION: 0
ACTIVITY CHANGE: 1

## 2023-12-05 ASSESSMENT — ACTIVITIES OF DAILY LIVING (ADL)
ADLS_ACUITY_SCORE: 35

## 2023-12-05 NOTE — TELEPHONE ENCOUNTER
S: NARGIS Brooks  Vanna  calling at 5:58 PM  about a 33 year old/Female presenting with Increasing Depression and SI w/a plan     B: Pt arrived via Family. Presenting problem, stressors: Pt BIB to ED with partner.  Pt reports for last 6 months dealing with increasing depression and SI w/a plan.  Pt reports her SI peaked in last month and she has a  plan to overdose in water with wine and sleeping pills or be using her car in the garage.  Pt reports several stressors like Family issues and her worsening depression.  Pt reports she has not been prescribed or taking MH medications for a long time.  Pt reports she was in therapy but stopped due to Health insurance and just restarted again.  Pt reports nothing is improving and she is calling in sick to work as she is unable to do her job and meet deadlines.    Pt affect in ED: Flat  Pt Dx: Major Depressive Disorder, PTSD, and ADHD  Previous [facility] hx? Yes: several years ago   Pt endorses SI with a plan to overdose with sleeping pills and ETOH  or fall asleep in her car in the garage  Hx of suicide attempt? Yes: syat age 20 overdose  Pt has a remote hx of SIB via Unknown  Pt denies HI   Pt denies hallucinations .   Pt RARS Score: 2    Hx of aggression/violence, sexual offenses, legal concerns, Epic care plan? describe: NOne  Current concerns for aggression this visit? No  Does pt have a history of Civil Commitment? No  Is Pt their own guardian? Yes    Pt is not prescribed medication. Is patient medication compliant? N/A  Pt endorses OP services: Therapist  CD concerns: None  Acute or chronic medical concerns: None but deals with chronic back pain  Does Pt present with specific needs, assistive devices, or exclusionary criteria? None      Pt is ambulatory  Pt is able to perform ADLs independently      A: Pt to be reviewed for [facility] admission. Pt is Voluntary  Preferred placement: [facility] placement only    COVID Symptoms: No  If yes, COVID test required    Utox: Ordered, not yet collected   CMP: N/A  CBC: N/A  HCG: N/A    R: Patient cleared and ready for behavioral bed placement: Yes  Pt placed on [facility] worklist? Yes    Does Patient need a Transfer Center request created? Yes, writer completed Transfer Center request at:  6:08 PM

## 2023-12-05 NOTE — ED NOTES
Patient's partner asked if he could leave his contact number incase the  or provider  needed to speak with him: Tevin [phone]

## 2023-12-05 NOTE — ED PROVIDER NOTES
History     Chief Complaint   Patient presents with    Suicidal     HPI  Ysabel David is a 33 year old female who presents from home with partner for concern related to mental health.  She is followed closely by [facility].  She was seen yesterday.  She has been having increasing depressive symptoms and thoughts of suicide.  She has had previous inpatient mental health hospitalizations.  Her diagnoses include ADD, PTSD and depression.  She does not think she has ever had manic type symptoms.  Her partner corroborates this.  She denies any chance of pregnancy.  Denies any recent or current substance abuse. Not currently on any medications for her symptoms. Recently restarted therapy after a hiatus due to social issues. She has several recent stressors.     Allergies:  No Known Allergies    Problem List:    Patient Active Problem List    Diagnosis Date Noted    Suicidal ideation 12/05/2023     Priority: Medium    Chronic midline thoracic back pain 05/24/2023     Priority: Medium    Attention deficit hyperactivity disorder (ADHD), unspecified ADHD type 07/06/2020     Priority: Medium    Controlled substance agreement signed 07/06/2020     Priority: Medium    Major depressive disorder, recurrent severe without psychotic features (H) 07/06/2020     Priority: Medium    Stress due to family tension 07/06/2020     Priority: Medium    Postoperative state 06/21/2017     Priority: Medium    Headache 03/01/2011     Priority: Medium    Insomnia 03/01/2011     Priority: Medium    Other syndromes affecting cervical region 03/01/2011     Priority: Medium        Past Medical History:    Past Medical History:   Diagnosis Date    Borderline personality disorder (H)     Major depressive disorder, single episode     Personal history of other medical treatment (CODE)        Past Surgical History:    Past Surgical History:   Procedure Laterality Date    APPENDECTOMY OPEN      06/14/2017,Lap Appy       Family History:    Family  History   Problem Relation Age of Onset    Breast Cancer Maternal Grandmother         Cancer-breast    Other - See Comments Brother         Psychiatric illness,Severe depression    Colon Cancer No family hx of         Cancer-colon    Prostate Cancer No family hx of         Cancer-prostate    Ovarian Cancer No family hx of         Cancer-ovarian    Blood Disease No family hx of         Blood Disease    Asthma No family hx of         Asthma    Heart Disease No family hx of         Heart Disease    Diabetes No family hx of         Diabetes    Hypertension No family hx of         Hypertension    Seizure Disorder No family hx of         Seizures    Thyroid Disease No family hx of         Thyroid Disease       Social History:  Marital Status:  Single [1]  Social History     Tobacco Use    Smoking status: Never     Passive exposure: Past    Smokeless tobacco: Never   Vaping Use    Vaping Use: Never used   Substance Use Topics    Alcohol use: Yes     Comment: Alcoholic Drinks/day: socially    Drug use: Yes     Types: Marijuana     Comment: Drug use: Yes rarely        Medications:    No current outpatient medications on file.        Review of Systems   Constitutional:  Positive for activity change.   Psychiatric/Behavioral:  Positive for suicidal ideas. Negative for confusion.        Physical Exam   BP: (!) 135/90  Pulse: 108  Temp: 97.4  F (36.3  C)  Resp: 18  Weight: 86.6 kg (191 lb)  SpO2: 100 %      Physical Exam  Constitutional:       Appearance: Normal appearance.   HENT:      Head: Normocephalic and atraumatic.      Mouth/Throat:      Mouth: Mucous membranes are moist.   Eyes:      Pupils: Pupils are equal, round, and reactive to light.   Cardiovascular:      Rate and Rhythm: Normal rate and regular rhythm.      Pulses: Normal pulses.   Pulmonary:      Effort: Pulmonary effort is normal.      Breath sounds: Normal breath sounds.   Abdominal:      General: Abdomen is flat.   Neurological:      Mental Status: She is  alert.         ED Course     Mental Health Risk Assessment        PSS-3      Date and Time Over the past 2 weeks have you felt down, depressed, or hopeless? Over the past 2 weeks have you had thoughts of killing yourself? Have you ever attempted to kill yourself? When did this last happen? User   12/05/23 1506 yes yes yes more than 6 months ago MW          C-SSRS ([place])      Date and Time Q1 Wished to be Dead (Past Month) Q2 Suicidal Thoughts (Past Month) Q3 Suicidal Thought Method Q4 Suicidal Intent without Specific Plan Q5 Suicide Intent with Specific Plan Q6 Suicide Behavior (Lifetime) Within the Past 3 Months? RETIRED: Level of Risk per Screen Screening Not Complete User   12/05/23 1804 yes yes yes yes yes -- -- -- -- CS   12/05/23 1506 yes yes yes yes yes -- -- -- -- MW                Suicide assessment completed by mental health (D.E.C., LCSW, etc.)    ED Course as of 12/06/23 0756   Tue Dec 05, 2023   1530 Patient encouarged   1531 Therapist encouraged her to come in for eval for SI. Here with partner   1538 DEC assessment ordered   1713 Spoke with DEC .    1715 Patient agreed to inpatient mental health voluntary admission   1751 Patient care assumed per routine change of shift from Dr. Escobedo.    - Currently voluntary for inpatient mental health admission.    - Okay to place hold if needed if patient decides to attempt to leave.  -Currently awaiting bed/placement.   2219 Urine drug screen positive for amphetamines and cannabinoids.  COVID-negative.  TSH elevated, free T4 within normal range.  Patient accepted for admission to [facility].     Procedures              Critical Care time:  none               Results for orders placed or performed during the hospital encounter of 12/05/23 (from the past 24 hour(s))   TSH with free T4 reflex   Result Value Ref Range    TSH 6.55 (H) 0.30 - 4.20 uIU/mL   Extra Tube    Narrative    The following orders were created for panel order Extra Tube.  Procedure                                Abnormality         Status                     ---------                               -----------         ------                     Extra Purple Top Tube[[id number]]                            Final result                 Please view results for these tests on the individual orders.   Extra Purple Top Tube   Result Value Ref Range    Hold Specimen JIC    T4 free   Result Value Ref Range    Free T4 1.42 0.90 - 1.70 ng/dL   Asymptomatic COVID-19 Virus (Coronavirus) by PCR Nose    Specimen: Nose; Swab   Result Value Ref Range    SARS CoV2 PCR Negative Negative    Narrative    Testing was performed using the Xpert Xpress SARS-CoV-2 Assay on the Cepheid Gene-Xpert Instrument Systems. Additional information about this Emergency Use Authorization (EUA) assay can be found via the Lab Guide. This test should be ordered for the detection of SARS-CoV-2 in individuals who meet SARS-CoV-2 clinical and/or epidemiological criteria as well as from individuals without symptoms or other reasons to suspect COVID-19. Test performance for asymptomatic patients has only been established in anterior nasal swab specimens. This test is for in vitro diagnostic use under the FDA EUA for laboratories certified under CLIA to perform high complexity testing. This test has not been FDA cleared or approved. A negative result does not rule out the presence of PCR inhibitors in the specimen or target RNA concentration below the limit of detection for the assay. The possibility of a false negative should be considered if the patient's recent exposure or clinical presentation suggests COVID-19. This test was validated by [facility]. This laboratory is certified under the Clinical Laboratory Improvement Amendments (CLIA) as qualified to perform high complexity clinical laboratory testing.   Urine Drug Screen    Narrative    The following orders were created for panel order  Urine Drug Screen.  Procedure                               Abnormality         Status                     ---------                               -----------         ------                     Urine Drug Screen Panel[[id number]]      Abnormal            Final result                 Please view results for these tests on the individual orders.   Urine Drug Screen Panel   Result Value Ref Range    Amphetamines Urine Screen Positive (A) Screen Negative    Barbituates Urine Screen Negative Screen Negative    Benzodiazepine Urine Screen Negative Screen Negative    Cannabinoids Urine Screen Positive (A) Screen Negative    Cocaine Urine Screen Negative Screen Negative    Fentanyl Qual Urine Screen Negative Screen Negative    Opiates Urine Screen Negative Screen Negative    PCP Urine Screen Negative Screen Negative       Medications - No data to display    Assessments & Plan (with Medical Decision Making)     I have reviewed the nursing notes.    I have reviewed the findings, diagnosis, plan and need for follow up with the patient.           Medical Decision Making  The patient's presentation was of high complexity (a chronic illness severe exacerbation, progression, or side effect of treatment).    The patient's evaluation involved:  history and exam without other MDM data elements    The patient's management necessitated high risk (a decision regarding hospitalization).        Discharge Medication List as of 12/5/2023 10:53 PM          Final diagnoses:   Depression with suicidal ideation   Patient agrees inpatient voluntary mental health treatment at this time.  Of significance she had thyroid cancer.  Will check TSH.  Will sign out to Dr. Guerrier's for ongoing management until inpatient bed can be found.  At this time she is voluntary and not on a 72-hour hold.  If, however, she tried to leave I would place her on a 72-hour hold.    12/5/2023   [facility] AND \[facility]\""       Deepthi Escobedo DO  12/05/23 3602        Deepthi Escobedo,   12/06/23 0758

## 2023-12-05 NOTE — ED PROVIDER NOTES
Emergency Department Transfer of Care Note    Assessment / Plan / Medical Decision Making   Agree with previous provider's plan.     Patient personally evaluated.  EXAM:  Pleasant, conversant.  Appears somewhat sad with depressed mood.  Lungs are clear, nonlabored breathing.  No wheezing.  Heart is regular.    Transfer of Care Plan:  ED Course as of 12/05/23 2222   Tue Dec 05, 2023   1530 Patient encouarged   1531 Therapist encouraged her to come in for eval for SI. Here with partner   1538 DEC assessment ordered   1713 Spoke with DEC .    1715 Patient agreed to inpatient mental health voluntary admission   1751 Patient care assumed per routine change of shift from Dr. Escobedo.    - Currently voluntary for inpatient mental health admission.    - Okay to place hold if needed if patient decides to attempt to leave.  -Currently awaiting bed/placement.   2219 Urine drug screen positive for amphetamines and cannabinoids.  COVID-negative.  TSH elevated, free T4 within normal range.  Patient accepted for admission to [facility].       New Prescriptions    No medications on file       Final diagnoses:   Depression with suicidal ideation       Néstor Woods MD  12/5/2023   [facility] AND [facility]     Néstor Woods MD  12/05/23 2222

## 2023-12-05 NOTE — ED NOTES
DEC assessment started.    MONITOR: Patient has narrow angle(s) but these are not deemed to be closeable at this time. However, angle closure is not always predictable and the signs and symptoms of this were discussed. The patient was asked to follow up with repeat gonioscopy as scheduled.

## 2023-12-05 NOTE — ED NOTES
Patient's partner also brought in a black duffle bag that patient packed prior to arrival. Security labeled bag and placed in secure locker with other belongings

## 2023-12-06 ENCOUNTER — APPOINTMENT (OUTPATIENT)
Dept: OCCUPATIONAL THERAPY | Facility: HOSPITAL | Age: 33
End: 2023-12-06
Attending: PSYCHIATRY & NEUROLOGY
Payer: COMMERCIAL

## 2023-12-06 PROCEDURE — 99223 1ST HOSP IP/OBS HIGH 75: CPT | Mod: AI | Performed by: NURSE PRACTITIONER

## 2023-12-06 PROCEDURE — 99222 1ST HOSP IP/OBS MODERATE 55: CPT | Performed by: NURSE PRACTITIONER

## 2023-12-06 PROCEDURE — 97165 OT EVAL LOW COMPLEX 30 MIN: CPT | Mod: GO

## 2023-12-06 PROCEDURE — 250N000013 HC RX MED GY IP 250 OP 250 PS 637: Performed by: NURSE PRACTITIONER

## 2023-12-06 PROCEDURE — 124N000001 HC R&B MH

## 2023-12-06 RX ORDER — CYANOCOBALAMIN 1000 UG/ML
1000 INJECTION, SOLUTION INTRAMUSCULAR; SUBCUTANEOUS WEEKLY
Status: DISCONTINUED | OUTPATIENT
Start: 2023-12-11 | End: 2023-12-07 | Stop reason: HOSPADM

## 2023-12-06 RX ORDER — CHLORAL HYDRATE 500 MG
1 CAPSULE ORAL EVERY MORNING
Status: DISCONTINUED | OUTPATIENT
Start: 2023-12-06 | End: 2023-12-07 | Stop reason: HOSPADM

## 2023-12-06 RX ORDER — TRAZODONE HYDROCHLORIDE 50 MG/1
50 TABLET, FILM COATED ORAL AT BEDTIME
Status: DISCONTINUED | OUTPATIENT
Start: 2023-12-06 | End: 2023-12-07

## 2023-12-06 RX ORDER — CHLORAL HYDRATE 500 MG
1 CAPSULE ORAL EVERY MORNING
COMMUNITY

## 2023-12-06 RX ORDER — VITAMIN B COMPLEX
100 TABLET ORAL DAILY
Status: DISCONTINUED | OUTPATIENT
Start: 2023-12-06 | End: 2023-12-07 | Stop reason: HOSPADM

## 2023-12-06 RX ORDER — DEXTROAMPHETAMINE SACCHARATE, AMPHETAMINE ASPARTATE, DEXTROAMPHETAMINE SULFATE AND AMPHETAMINE SULFATE 1.25; 1.25; 1.25; 1.25 MG/1; MG/1; MG/1; MG/1
20 TABLET ORAL DAILY
Status: DISCONTINUED | OUTPATIENT
Start: 2023-12-06 | End: 2023-12-06

## 2023-12-06 RX ORDER — QUETIAPINE FUMARATE 200 MG/1
200 TABLET, FILM COATED ORAL AT BEDTIME
Status: DISCONTINUED | OUTPATIENT
Start: 2023-12-06 | End: 2023-12-06

## 2023-12-06 RX ORDER — DEXTROAMPHETAMINE SACCHARATE, AMPHETAMINE ASPARTATE MONOHYDRATE, DEXTROAMPHETAMINE SULFATE AND AMPHETAMINE SULFATE 2.5; 2.5; 2.5; 2.5 MG/1; MG/1; MG/1; MG/1
30 CAPSULE, EXTENDED RELEASE ORAL DAILY
Status: DISCONTINUED | OUTPATIENT
Start: 2023-12-07 | End: 2023-12-07 | Stop reason: HOSPADM

## 2023-12-06 RX ORDER — DEXTROAMPHETAMINE SACCHARATE, AMPHETAMINE ASPARTATE, DEXTROAMPHETAMINE SULFATE AND AMPHETAMINE SULFATE 1.25; 1.25; 1.25; 1.25 MG/1; MG/1; MG/1; MG/1
20 TABLET ORAL DAILY
Status: DISCONTINUED | OUTPATIENT
Start: 2023-12-07 | End: 2023-12-07 | Stop reason: HOSPADM

## 2023-12-06 RX ORDER — DOCUSATE SODIUM 100 MG/1
100 CAPSULE, LIQUID FILLED ORAL DAILY
Status: DISCONTINUED | OUTPATIENT
Start: 2023-12-06 | End: 2023-12-07 | Stop reason: HOSPADM

## 2023-12-06 RX ORDER — IBUPROFEN 400 MG/1
400 TABLET, FILM COATED ORAL EVERY 6 HOURS PRN
Status: DISCONTINUED | OUTPATIENT
Start: 2023-12-06 | End: 2023-12-07 | Stop reason: HOSPADM

## 2023-12-06 RX ORDER — QUETIAPINE FUMARATE 50 MG/1
50 TABLET, FILM COATED ORAL 3 TIMES DAILY PRN
Status: DISCONTINUED | OUTPATIENT
Start: 2023-12-06 | End: 2023-12-07 | Stop reason: HOSPADM

## 2023-12-06 RX ADMIN — FLUOXETINE 20 MG: 20 CAPSULE ORAL at 14:51

## 2023-12-06 RX ADMIN — TRAZODONE HYDROCHLORIDE 50 MG: 50 TABLET ORAL at 20:16

## 2023-12-06 RX ADMIN — Medication 100 MCG: at 11:55

## 2023-12-06 RX ADMIN — OMEGA-3 FATTY ACIDS CAP 1000 MG 1 G: 1000 CAP at 11:55

## 2023-12-06 RX ADMIN — LEVOTHYROXINE SODIUM 175 MCG: 0.1 TABLET ORAL at 14:51

## 2023-12-06 RX ADMIN — DOCUSATE SODIUM 100 MG: 100 CAPSULE, LIQUID FILLED ORAL at 11:55

## 2023-12-06 ASSESSMENT — ACTIVITIES OF DAILY LIVING (ADL)
ADLS_ACUITY_SCORE: 30
ORAL_HYGIENE: INDEPENDENT
ADLS_ACUITY_SCORE: 30
HYGIENE/GROOMING: INDEPENDENT
DRESS: SCRUBS (BEHAVIORAL HEALTH)
LAUNDRY: UNABLE TO COMPLETE
ADLS_ACUITY_SCORE: 30
ADLS_ACUITY_SCORE: 30
ADLS_ACUITY_SCORE: 45

## 2023-12-06 NOTE — PROGRESS NOTES
12/06/23 0037   Patient Belongings   Did you bring any home meds/supplements to the hospital?  No   Patient Belongings remains with patient  (4 socks 2 [id number],7top,2jeans,glasses an glassesonher face,duffle bag,4book,2bra,bag of personal items,pens,blk.pants,sweatshirt,sweater,)   Patient Belongings Remaining with Patient clothing   Patient Belongings Put in Hospital Secure Location (Security or Locker, etc.) clothing   Belongings Search Yes   Clothing Search Yes   Second Staff yan villarreal     List items sent to safe:cellphone with crackscreen  All other belongings put in assigned cubby in belongings room.       I have reviewed my belongings list on admission and verify that it is correct.     Patient signature_______________________________    Second staff witness (if patient unable to sign) ______________________________       I have received all my belongings at discharge.    Patient signature________________________________    Renae Zhang CNA  12/6/2023  12:42 AM
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
## 2023-12-06 NOTE — PROGRESS NOTES
Behavioral Health Occupational Therapy Eval      Name: Ysabel David MRN# [id number]   Age: 33 year old YOB: 1990     Date of Consultation: December 6, 2023  Primary care provider: Zay Carlos    Referring Physician: Jeanne Wright NP  Orders: Eval and Treat  Medical Diagnosis: SI, ADHD, MDD, Insomnia.   Onset of Illness/Injury: Patient admitted to this facility after increase in stressors and thoughts of SI.     Prior Level of Function: Indep with ADL, works part time, lives with significant other in their apartment. Notes a history of struggling with sleep.     Current Level of Function: Patient willing to work with OT for SAD lamp use. She notes that she has started utilizing her SAD lamp this past week. She isn't entirely sure if she is using it per  guidelines. Discussed looking at those guidelines when she is able to check her particular model and the recommended uses. Spoke in generalities of distance, not using sunglassess, not staring directly at the lamp etc. Discussed what she is trying to target symptom wise. Patient does feel that she has a seasonal component to mood and also sleep issues. Discussed about using her personal lamp to as close to waking up as possible.   Discussed other factors that can affect sleep and provided suggestions on caffeine, food choices prior to bed, stress management etc.     Patient/Family Goal: utilize sad lamp on the  unit.        Past Medical History:   Past Medical History:   Diagnosis Date    Borderline personality disorder (H)     ?    Major depressive disorder, single episode     suicidal ideation, hospitalized x 3 in 2012    Personal history of other medical treatment (CODE)     Age 14       Past Surgical History:  Past Surgical History:   Procedure Laterality Date    APPENDECTOMY OPEN      06/14/2017,Lap Appy       Medications:   Current Facility-Administered Medications   Medication    acetaminophen (TYLENOL) tablet 650 mg     alum & mag hydroxide-simethicone (MAALOX) suspension 30 mL    [START ON 12/11/2023] cyanocobalamin injection 1,000 mcg    docusate sodium (COLACE) capsule 100 mg    hydrOXYzine HCl (ATARAX) tablet 25 mg    ibuprofen (ADVIL/MOTRIN) tablet 400 mg    levothyroxine (SYNTHROID/LEVOTHROID) tablet 175 mcg    melatonin tablet 3 mg    nicotine (NICORETTE) gum 2 mg    OLANZapine (zyPREXA) tablet 10 mg    Or    OLANZapine (zyPREXA) injection 10 mg    Vitamin D3 (CHOLECALCIFEROL) tablet 100 mcg       Reason for OT Referral:  Mental Health History: history of depression, borderline personality.   Signs/Symptoms of compliant: Depression has gotten to the point that it is impacting her ability to engage in tasks: work, leisure, or experiencing pleasure in activity.   Aggravating factors/Current Life Stressors: financial, family issues, trauma, recent thyroid cancer diagnosis and thyroidectomy in February 2023.   Current Services: [facility] [employer].     Personal Information:  Family Structure: Has a significant other.   Living Arrangement: apartment   Finances: employed part time  Medication Management: Patient manages her medications at home.    Support System: Significant other.     Physical Presentation:   Mobility: indep      Self Care:   Nutrition: I would like to improve  Sleep Pattern: I would like to improve  Leisure: I would like to improve  Coping Skills: I would like to improve    Cognition:  Orientation:  time, place, and person  Memory: Patient reports brain fog.   Attention: Diminished, patient reports brain fog      Goals:   Complete assessment of tolerance to SAD lamp and update CP to reflect this.     Planned Interventions: Sad lamp use.     Clinical Impressions:  Criteria for Skilled Therapeutic Intervention Met: yes  OT Diagnosis: MDD  Influenced by the following impairments: Depression has gotten to the point that it is impacting her ability to engage in tasks: work, leisure, or experiencing pleasure in  activity.   Functional limitations due to impairment: difficulty with engaging in daily tasks at prior level.  Clinical presentation: Evolving/Changing  Clinical presentation rationale: Patient is cooperative, able to make needs known, aware of her symptoms, good insight to MH.  Clinical Decision making (complexity): Low Complexity  Predicted Duration of Therapy Intervention (days/wks): eval and prn.   Risks and Benefits of therapy have been explained: Yes  Patient, Family & other staff in agreement with plan of care: Yes  Comments: Pt. Tolerated session well, CP to be updated.     Total Evaluation Time: 20

## 2023-12-06 NOTE — PLAN OF CARE
Problem: Suicide Risk  Goal: Absence of Self-Harm  Description: Pt will be free from self harm during hospital stay.  Pt will verbalize decrease depression and suicide ideation by discharge.  Pt will verbalize suicide prevention plan by discharge.    Outcome: Progressing     Problem: Adult Behavioral Health Plan of Care  Goal: Patient-Specific Goal (Individualization)  Description: Pt will attend at least 50% of groups.  Pt will eat at least 50% of meals.  Pt will sleep at least 6-8 hours per night.  Pt will be compliant with ordered medications and treatment team recommendations.   Pt. Ok to utilize SAD lamp 1 x daily up to 20 minutesv with staff present, prior to noon.   Outcome: Progressing     Patient has been calm, cooperative, and medication compliant this shift.  She came out to the lounge for meals but avoids social contact with peers.  States she is depressed related to increased stressors in her life.  Denies suicidal thoughts and states she will be safe while on the unit.  Affect is flat and sad.  No complaints of pain.  VS WNL.Face to face end of shift report communicated to evening shift RN.     Regine Lucas RN  12/6/2023  12:59 PM

## 2023-12-06 NOTE — ED NOTES
"  Ysabel David  December 5, 2023  Plan of Care Hand-off Note     Patient Care Path: inpatient mental health    Plan for Care:   Patient was referred to the ED by her therapist due to worsening depression and suicidal thoughts with a plan. She experiences episodes of \"extremely\" low and dark, where she is constantly thinks about suicide, struggling to get out of that mode, and difficulty functioning at home and work. She endorses persistent sadness, difficulty experiencing pleasure in things she likes to go, unable to get started and do her job at work. She is unable to keep her accountabilty agreement with her partner that she would not commit suicide. She is currently suicidal with a plan to take sleeping pils and wine and go sleep in water, or inside her car in the garage. Patient identified multiple stressors including family issues, financial difficulties and other trauma. This past year has been difficult in that she had surgery and was diagnosed with thyroid cancer.     She recently re-started individual therapy due to a lapse in health insurance. She is not currently taking any prescription medications. She has a supportive partner.    Identified Goals and Safety Issues:  Stabilization, safety and treatment.     Overview: The patient is in need of therapeutic stabilization, psych consult and safety.       Legal Status:  Voluntary     Psychiatry Consult:  N.A.        Updated   regarding plan of care.           JOHNATHON Quinonez, LICSW. Psychotherapist         "

## 2023-12-06 NOTE — TELEPHONE ENCOUNTER
R: Patient cleared and ready for behavioral bed placement: Yes    8:14 PM Intake called [facility] intake line 2x, no answer.     8:22 PM Intake received a call from [facility] and provided MRN for review.     9:35PM Intake received a call from CRN at [facility]. [facility]     9:55 PM Intake called [facility] and provided placement information to pt's nurse, Alondra.

## 2023-12-06 NOTE — PLAN OF CARE
"ADMISSION NOTE    Reason for admission Suicidal thoughts.  Safety concerns - not at this time.  Risk for or history of violence - not at this time.   Full skin assessment: Completed - no concerns at this time.     Patient arrived on unit from [facility] accompanied by two unit staff on 12/5/2023  2346.     Status on arrival: Pt is calm and cooperative on arrival to the unit. Pt reports that she has had many stressors lately including financial, family, and work. Pt states, \"I have had depression since I was a kid and now I am just tired.\" Pt reports having suicidal thoughts but also states that she would not act on them because she has promised her partner that she would not. She states, \"I would just rather not be alive.\" Pt reports that in the last year she was diagnosed with thyroid cancer and had to have the thyroid removed. Since the surgery she has had increased difficulty with her depression. Pt states, \"I think part of it is that my hormones are all over the place.\"     Pt denies suicidal thoughts at this time and does commit to safety while on the unit. Pt denies thoughts to harm others. Pt denies auditory or visual hallucinations.     Pt reports that she drinks 2-4 glasses of wine a night, and is trying to cut down on that. Pt reports that she uses marijuana occasionally. Denies the use of other substances.     /91 (BP Location: Left arm)   Pulse 97   Temp 97.7  F (36.5  C) (Tympanic)   Resp 16   Ht 1.727 m (5' 7.99\")   Wt 85.3 kg (188 lb)   LMP 10/31/2023 (Within Days)   SpO2 98%   BMI 28.59 kg/m      Welcome to  unit papers given to patient, wanding completed, belongings inventoried, and admission assessment completed.     Patient's legal status on arrival is voluntary. Appropriate legal rights discussed with and copy given to patient. Patient Bill of Rights discussed with and copy given to patient.                 "

## 2023-12-06 NOTE — DISCHARGE INSTRUCTIONS
Behavioral Discharge Planning and Instructions    Summary:     Main Diagnosis:     Health Care Follow-up:       Attend all scheduled appointments with your outpatient providers. Call at least 24 hours in advance if you need to reschedule an appointment to ensure continued access to your outpatient providers.     Major Treatments, Procedures and Findings:  You were provided with: a psychiatric assessment, assessed for medical stability, medication evaluation and/or management, group therapy, family therapy, individual therapy, CD evaluation/assessment, milieu management, and medical interventions    Symptoms to Report: feeling more aggressive, increased confusion, losing more sleep, mood getting worse, or thoughts of suicide    Early warning signs can include: increased depression or anxiety sleep disturbances increased thoughts or behaviors of suicide or self-harm  increased unusual thinking, such as paranoia or hearing voices    Safety and Wellness:  Take all medicines as directed.  Make no changes unless your doctor suggests them.      Follow treatment recommendations.  Refrain from alcohol and non-prescribed drugs.  Ask your support system to help you reduce your access to items that could harm yourself or others. Items could include:  Firearms  Medicines (both prescribed and over-the-counter)  Knives and other sharp objects  Ropes and like materials  Car keys  If there is a concern for safety, call 911. If there is a concern for safety, call 911.    Resources:   Crisis Intervention: 553.470.9242 or 894-532-2698 (TTY: 162.613.1720).  Call anytime for help.  National [place] on Mental Illness (www.[place].cesario.org): 374.179.1163 or 561-449-6735.  [place] Association for Children's Mental Health (www.macmh.org): 159.564.6178.  Alcoholics Anonymous (www.alcoholics-anonymous.org): Check your phone book for your local chapter.  Suicide Awareness Voices of Education (SAVE) (www.save.org): 431-073-QYLQ (1366)  National Suicide  "Prevention Line (www.mentalhealthmn.org): 658-701-FGHM (5372)  Mental Health Consumer/Survivor Network of [place] (www.mhcsn.net): 743.348.5000 or 367-719-8696  Mental Health Association of [place] (www.mentalhealth.org): 212.369.3413 or 356-038-0176  Self- Management and Recovery Training., SMART-- Toll free: 316.474.7968  [employer]  Text 4 Life: txt \"LIFE\" to 31468 for immediate support and crisis intervention  Crisis text line: Text \"[place]\" to 215018. Free, confidential, 24/7.  Crisis Intervention: 701.203.2837 or 346-230-7451. Call anytime for help.     General Medication Instructions:   See your medication sheet(s) for instructions.   Take all medicines as directed.  Make no changes unless your doctor suggests them.   Go to all your doctor visits.  Be sure to have all your required lab tests. This way, your medicines can be refilled on time.  Do not use any drugs not prescribed by your doctor.  Avoid alcohol.    Advance Directives:   Scanned document on file with [employer]? No scanned doc  Is document scanned? Pt states no documents  Honoring Choices Your Rights Handout: Informed and given  Was more information offered? Pt declined    The Treatment team has appreciated the opportunity to work with you. If you have any questions or concerns about your recent admission, you can contact the unit which can receive your call 24 hours a day, 7 days a week. They will be able to get in touch with a Provider if needed. The unit number is [phone] .  "

## 2023-12-06 NOTE — MEDICATION SCRIBE - ADMISSION MEDICATION HISTORY
Medication Scribe Admission Medication History    Admission medication history is complete. The information provided in this note is only as accurate as the sources available at the time of the update.    Information Source(s): Patient and CareEverywhere/SureScripts via in-person    Pertinent Information:   Patient manages her own medications and is a good historian.   Adderall IR- takes in the afternoon PRN but does take most days.   Vit D- pt reports she is supposed to be taking a higher dose of OTC Vit D but is unsure what her doctor recommended. Currently taking 4000 units at this time.   Seroquel- currently has both the 200 mg tablet and the 50 mg tablets at home. Reports she alternates between the two based on the type of sleep she needs. If she needs to wake early she uses the 50 mg tablets. If she needs longer, uninterrupted sleep she uses the 200 mg tablet, etc. She always takes 200 mg at HS.    Changes made to PTA medication list:  Added: fish oil   Deleted: outdated adderall  Changed: None    Medication Affordability:  Not including over the counter (OTC) medications, was there a time in the past 3 months when you did not take your medications as prescribed because of cost?: No    Allergies reviewed with patient and updates made in EHR: yes    Medication History Completed By: Valerie Licona 12/6/2023 10:23 AM    Current Facility-Administered Medications for the 12/5/23 encounter (Hospital Encounter)   Medication    cyanocobalamin injection 1,000 mcg    [START ON 1/22/2024] cyanocobalamin injection 1,000 mcg     PTA Med List   Medication Sig Last Dose    [START ON 1/3/2024] amphetamine-dextroamphetamine (ADDERALL XR) 30 MG 24 hr capsule Take 1 capsule (30 mg) by mouth daily for 30 days 12/5/2023 at AM    amphetamine-dextroamphetamine (ADDERALL) 30 MG tablet Take 1 tablet (30 mg) by mouth daily for 30 days (Patient taking differently: Take 30 mg by mouth daily as needed (takes most days in the afternoon))  Past Week at PM    etonogestrel (NEXPLANON) 68 MG IMPL 1 each by Subdermal route once 12/6/2023    fish oil-omega-3 fatty acids 1000 MG capsule Take 1 g by mouth every morning 12/5/2023 at AM    levothyroxine (SYNTHROID/LEVOTHROID) 175 MCG tablet Take 1 tablet by mouth daily before breakfast 12/5/2023 at AM    QUEtiapine (SEROQUEL) 200 MG tablet TAKE 1 TABLET BY MOUTH AT BEDTIME Past Week at HS    QUEtiapine (SEROQUEL) 50 MG tablet TAKE 3 to 4 TABLETS BY MOUTH EVERY DAY AT BEDTIME (Patient taking differently: Take 200 mg by mouth at bedtime -alternates with the 200 mg tablet based on the sleep she needs.) Past Week at HS    Vitamin D2 (Ergocalciferol) 50 MCG (2000 UT) CAPS Take 2 capsules by mouth daily 12/5/2023 at AM
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
## 2023-12-06 NOTE — H&P
"[facility] PSYCHIATRY   HISTORY AND PHYSICAL     ADMISSION DATA     Ysabel David MRN# [id number]   Age: 33 year old YOB: 1990     Date of Admission: 12/5/2023  Primary Physician: Zay Carlos        CHIEF COMPLAINT   \"Past few months having deeper lows.\"       HISTORY OF PRESENT ILLNESS     Ysabel is a 33 year old female who was brought to the [facility] ED by her significant other for evaluation of suicidal thoughts and increased depression. Patient reported a plan to fall asleep in the garage with the car running or to overdose on sleeping pills. Patient reports increased symptoms of depression. Patient had seen her therapist on Monday who encouraged patient to come to the ED for evaluation. Stressors include financial, challenges with family, and chronic back pain. Patient was in agreement to voluntary admission to behavioral health for further evaluation and treatment.     Per DEC:  Factors that make the mental health crisis life threatening or complex are:  The patient has been struggling with persistent depression since August 2022. Over the past couple of months, the depression has worsened to the extend that Kaila experiences suicidal thoughts at least daily. She has an accountability agreement with her partner that she would first talk to him before acting on her suicidal ideation. She has now reached a point where she is unclear if she can keep her promise. She had therapy yesterday, and the therapist recommended inpatient mental health/coming to the ED. Kaila reports having \"extreme\" low days where she feels depressed, hopeless, unable to motivate herself into doing basic things like preparing food. She works part-time and has been calling due to being depressed, and unable to concentrate on her job. She has not started on projects with specific deadlines, and does not have the energy nor the ability to concentrate to do the tasks. She has put in lot of effort to " "socialize which is hard. She prefers to isolate and not go out. She reports suicidal thoughts at least all day, with a plan. She has been considerning taking sleeping pills with wine and to go and sleep in water, or to fall asleep inside the car in the garage. She has been trying to control the suicidal thoughts with great difficulty. She denies any self-harm. She denies homicidal thoughs. She denies any subsance use, with occasional use of THC gummies.      She finds it helpful to take showers, to help soothe her body and thoughts. She recently re-started individual therapy due to a lapse in health insurance. She is not currently taking prescribed medications..    Per patient:  Ysabel is easily engaged in conversation today. She reports a long history of depression going back to childhood with \"lots of family trauma\". She notes that she has been having \"good days and bad days\", however has been having bad days more frequently. She notes that she was diagnosed with thyroid cancer this past year and had total thyroidectomy. She notes that her \"hormones are all over the place\" since then. She notes that the past few months she has been experiencing \"deeper lows\" and notes that it is getting harder to pull herself out of these. She reports feeling exhausted, has little energy, and has been isolating more. She does note stressors that include financial concerns, with her working only part time. 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 Chronic back pain also contributed to her quitting this job. She also reports a strained relationship with parents and siblings, does have a supportive significant other. She notes that she has been taking Seroquel at bedtime since the age of 21 \"for sleep\", though notes that she would like to get started on an antidepressant. She seemed surprised when told that Seroquel is considered an antipsychotic medication, so is likely " "not the best medication to take for just sleep. She also notes that this does not help with the racing thoughts, anxiety she has at bedtime. Is in agreement to try alternative medication for sleep and to treat depression/anxiety. She is also interested in getting connected with a psychiatric provider, as currently medication are prescribed by PCP only.         PSYCHIATRIC HISTORY     Has been hospitalized x3 in 2012 after suicide attempts.Historical diagnoses include MDD, PTSD, ADHD, borderline personality disorder. Recently get reconnected with a therapist at [facility], Mariluz Dodge. Medications currently being prescribed by her PCP. Medications include Seroquel, Adderall, Effexor, Hydroxyzine, as well as possible others. Patient notes that she has been taking Seroquel for a \"sleeping pill\" since she was age 21.          SUBSTANCE USE HISTORY   History   Drug Use    Types: Marijuana     Comment: Drug use: Yes rarely       Social History    Substance and Sexual Activity      Alcohol use: Yes        Comment: Alcoholic Drinks/day: socially      History   Smoking Status    Never   Smokeless Tobacco    Never     Reports occasional use of THC gummies, notes she will typically use them when significant other uses them though does not like how she feels. Does drink a few glasses of wine most nights, states her and her significant other are past of a wine club and like doing tastings.        SOCIAL HISTORY   Social History     Socioeconomic History    Marital status: Single     Spouse name: Not on file    Number of children: Not on file    Years of education: Not on file    Highest education level: Not on file   Occupational History    Occupation: STUDENT   Tobacco Use    Smoking status: Never     Passive exposure: Past    Smokeless tobacco: Never   Vaping Use    Vaping Use: Never used   Substance and Sexual Activity    Alcohol use: Yes     Comment: Alcoholic Drinks/day: socially    Drug use: Yes     Types: Marijuana     " Comment: Drug use: Yes rarely    Sexual activity: Yes     Partners: Male     Birth control/protection: Pill   Other Topics Concern    Not on file   Social History Narrative    She was home schooled.  Was involved in drama at the high school.        2020: She is doing a lot of theater. She wrote a play and it will be at the [employer] in September. She is working between [place] and here. She is working doing massage therapy and nannying.        4/5/21: Pt used [facility] to find affordable health care options and get more information about health insurance-           11/21: Living in [facility]. She is engaged and is getting  in [place] in September 2022.      Social Determinants of Health     Financial Resource Strain: Low Risk  (11/2/2023)    Financial Resource Strain     Within the past 12 months, have you or your family members you live with been unable to get utilities (heat, electricity) when it was really needed?: No   Food Insecurity: Low Risk  (11/2/2023)    Food Insecurity     Within the past 12 months, did you worry that your food would run out before you got money to buy more?: No     Within the past 12 months, did the food you bought just not last and you didn t have money to get more?: No   Transportation Needs: Low Risk  (11/2/2023)    Transportation Needs     Within the past 12 months, has lack of transportation kept you from medical appointments, getting your medicines, non-medical meetings or appointments, work, or from getting things that you need?: No   Physical Activity: Not on file   Stress: Not on file   Social Connections: Not on file   Interpersonal Safety: Low Risk  (11/2/2023)    Interpersonal Safety     Do you feel physically and emotionally safe where you currently live?: Yes     Within the past 12 months, have you been hit, slapped, kicked or otherwise physically hurt by someone?: No     Within the past 12 months, have you been humiliated or emotionally abused in other ways by your partner or  "ex-partner?: No   Housing Stability: Low Risk  (11/2/2023)    Housing Stability     Do you have housing? : Yes     Are you worried about losing your housing?: No     Grew up in [place]. Reports a very chaotic childhood, \"[place] in [place]\". Father left a Diana before marrying mother who is Scientologist. Patient notes emotional abuse throughout childhood from parents. Was home schooled from elementary school up until mid-high school, then attended public high school and graduated. Attended [employer] for massage therapy. Currently has a strained relationship with parents, father remains \"ultra-Adventism\" and patient's partner is atheist which is a stressor. Currently lives with significant other of 4 years in [place]. No children. 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 Had previously worked as a massage therapist, however quit in order to qualify for medical insurance. Denies any legal issues.        FAMILY HISTORY   Family History   Problem Relation Age of Onset    Breast Cancer Maternal Grandmother         Cancer-breast    Other - See Comments Brother         Psychiatric illness,Severe depression    Colon Cancer No family hx of         Cancer-colon    Prostate Cancer No family hx of         Cancer-prostate    Ovarian Cancer No family hx of         Cancer-ovarian    Blood Disease No family hx of         Blood Disease    Asthma No family hx of         Asthma    Heart Disease No family hx of         Heart Disease    Diabetes No family hx of         Diabetes    Hypertension No family hx of         Hypertension    Seizure Disorder No family hx of         Seizures    Thyroid Disease No family hx of         Thyroid Disease      Reports \"most of us are depressed and anxiety\". Little sister with borderline personality disorder.     PAST MEDICAL HISTORY   Past Medical History:   Diagnosis Date    Borderline personality disorder (H)     ?    Major " depressive disorder, single episode     suicidal ideation, hospitalized x 3 in 2012    Personal history of other medical treatment (CODE)     Age 14       Past Surgical History:   Procedure Laterality Date    APPENDECTOMY OPEN      06/14/2017,Lap Appy       Patient has no known allergies.     MEDICATIONS   Prior to Admission medications    Medication Sig Start Date End Date Taking? Authorizing Provider   amphetamine-dextroamphetamine (ADDERALL XR) 30 MG 24 hr capsule Take 1 capsule (30 mg) by mouth daily for 30 days 1/3/24 2/2/24 Yes Zay Carlos MD   amphetamine-dextroamphetamine (ADDERALL) 30 MG tablet Take 1 tablet (30 mg) by mouth daily for 30 days  Patient taking differently: Take 30 mg by mouth daily as needed (takes most days in the afternoon) 12/3/23 1/2/24 Yes Zay Carlos MD   etonogestrel (NEXPLANON) 68 MG IMPL 1 each by Subdermal route once 10/1/20  Yes Reported, Patient   fish oil-omega-3 fatty acids 1000 MG capsule Take 1 g by mouth every morning   Yes Reported, Patient   levothyroxine (SYNTHROID/LEVOTHROID) 175 MCG tablet Take 1 tablet by mouth daily before breakfast 2/15/23  Yes Reported, Patient   QUEtiapine (SEROQUEL) 200 MG tablet TAKE 1 TABLET BY MOUTH AT BEDTIME 11/2/23  Yes Zay Carlos MD   QUEtiapine (SEROQUEL) 50 MG tablet TAKE 3 to 4 TABLETS BY MOUTH EVERY DAY AT BEDTIME  Patient taking differently: Take 200 mg by mouth at bedtime -alternates with the 200 mg tablet based on the sleep she needs. 11/2/23  Yes Zay Carlos MD   Vitamin D2 (Ergocalciferol) 50 MCG (2000 UT) CAPS Take 2 capsules by mouth daily 9/25/23  Yes Reported, Patient   amphetamine-dextroamphetamine (ADDERALL XR) 30 MG 24 hr capsule Take 1 capsule (30 mg) by mouth daily for 30 days 12/3/23 1/2/24  Zay Carlos MD   amphetamine-dextroamphetamine (ADDERALL) 30 MG tablet Take 1 tablet (30 mg) by mouth daily for 30 days 1/3/24 2/2/24  Zay Carlos MD        PHYSICAL EXAM/ROS     I have reviewed the  "physical exam as documented by Laney Rader CNP and agree with findings and assessment and have no additional findings to add at this time. The review of systems is negative other than noted in the HPI.       LABS   Recent Results (from the past 24 hour(s))   TSH with free T4 reflex    Collection Time: 12/05/23  5:50 PM   Result Value Ref Range    TSH 6.55 (H) 0.30 - 4.20 uIU/mL   Extra Purple Top Tube    Collection Time: 12/05/23  5:50 PM   Result Value Ref Range    Hold Specimen JIC    T4 free    Collection Time: 12/05/23  5:50 PM   Result Value Ref Range    Free T4 1.42 0.90 - 1.70 ng/dL   Asymptomatic COVID-19 Virus (Coronavirus) by PCR Nose    Collection Time: 12/05/23  6:00 PM    Specimen: Nose; Swab   Result Value Ref Range    SARS CoV2 PCR Negative Negative   Urine Drug Screen Panel    Collection Time: 12/05/23  8:52 PM   Result Value Ref Range    Amphetamines Urine Screen Positive (A) Screen Negative    Barbituates Urine Screen Negative Screen Negative    Benzodiazepine Urine Screen Negative Screen Negative    Cannabinoids Urine Screen Positive (A) Screen Negative    Cocaine Urine Screen Negative Screen Negative    Fentanyl Qual Urine Screen Negative Screen Negative    Opiates Urine Screen Negative Screen Negative    PCP Urine Screen Negative Screen Negative         MENTAL STATUS EXAM   Vitals: /67 (BP Location: Right arm)   Pulse 83   Temp 97  F (36.1  C) (Tympanic)   Resp 14   Ht 1.727 m (5' 7.99\")   Wt 85.3 kg (188 lb)   LMP 10/31/2023 (Within Days)   SpO2 98%   BMI 28.59 kg/m      Appearance:  awake, alert, adequately groomed, dressed in hospital scrubs, and appeared as age stated  Attitude:  cooperative  Eye Contact:  good  Mood:  anxious and depressed  Affect:  mood congruent  Speech:  clear, coherent  Psychomotor Behavior:  no evidence of tardive dyskinesia, dystonia, or tics  Thought Process:  logical, linear, and goal oriented  Associations:  no loose associations  Thought Content:  " no evidence of suicidal ideation or homicidal ideation and no evidence of psychotic thought  Insight:  good  Judgment:  intact  Oriented to:  time, person, and place  Attention Span and Concentration:  fair  Recent and Remote Memory:  intact  Fund of Knowledge: appropriate  Muscle Strength and Tone: normal  Gait and Station: Normal       ASSESSMENT     This is a 33 year old female with a PMH of MDD, PTSD, ADHD, and borderline personality disorder who presents to the ED with significant other for evaluation of increased depression and suicidal thoughts. Had plan to fall asleep in he garage with vehicle running or take sleeping pills. Struggling with increased depression, feels that she has no energy or motivation, has been isolating more. Currently only taking Adderall for ADHD and Seroquel which she notes she takes for sleep and no longer feels is helpful. Patient did seem surprised to learn that Seroquel is an antipsychotic medication. She is interested in trying an antidepressant for treatment of depression and anxiety. Does admit that she will sometimes forget to take her daily medication. Did discuss Prozac having longer half life and less risk of withdrawal if doses missed. She is in agreement to try this medication. Also reviewed trying Trazodone at bedtime for sleep in place of Seroquel after review of risks, benefits, and side effects. Her TSH was slightly elevated on admit, with patient noting that she will forget to take synthroid at times, mainly because she cannot remember if she took it, then does not want to double dose. Did review trying a pill box that can be filled weekly and may help with this. She did recently get reestablished with her therapist, though is interested in seeing a psychiatric provider for medications.        DIAGNOSIS     Major depressive disorder, recurrent, severe  2.   PTSD, chronic  3.   History of ADHD  4.   R/o borderline personality disorder       PLAN     Location: Unit  5  Legal Status: Orders Placed This Encounter      Voluntary    Safety Assessment:    Behavioral Orders   Procedures    Code 1 - Restrict to Unit    Routine Programming     As clinically indicated    Status 15     Every 15 minutes.      PTA psychotropic medications held:     -Seroquel 200 mg at bedtime- stopped per pt request, no longer helping for sleep    PTA psychotropic medications continued/changed:     -Adderall XR 30 mg daily  -Adderall IR 30 mg daily-> decrease to 15 mg daily in afternoon. Can refuse if not needed    New medications initiated:     -standard unit PRN medications for anxiety/agitation  -Prozac 20 mg daily  -Trazodone 50 mg at bedtime for insomnia    Programming: Patient will be treated in a therapeutic milieu with appropriate individual and group therapies. Education will be provided on diagnoses, medications, and treatments.     Medical diagnoses:  Per medicine    Consult: OT- SAD lamp  Tests: none today    Anticipated LOS: 3-5 days  Disposition: home with outpatient services    Justification for hospitalization: reasons for hospitalization include potential safety risk to self or others within the last week, decreased functioning in outpatient setting and in the setting of no outpatient management, need for highly structured inpatient management for stabilization of psychiatric symptoms, need for psychiatric medication initiation and stabilization.       ATTESTATION      Jeanne Wright NP

## 2023-12-06 NOTE — PLAN OF CARE
Problem: Adult Behavioral Health Plan of Care  Goal: Patient-Specific Goal (Individualization)  Description: Pt will attend at least 50% of groups.  Pt will eat at least 50% of meals.  Pt will sleep at least 6-8 hours per night.  Pt will be compliant with ordered medications and treatment team recommendations.   Outcome: Progressing     Problem: Suicide Risk  Goal: Absence of Self-Harm  Description: Pt will be free from self harm during hospital stay.  Pt will verbalize decrease depression and suicide ideation by discharge.  Pt will verbalize suicide prevention plan by discharge.  Outcome: Progressing     Report received from previously assigned nurse.     Rounds completed. Safety checks completed every 15 minutes throughout the night. Pt noted to be resting with eyes closed and easy resp. for 3.5 hours. No suicidal gestures or comments noted.    Face to face end of shift report to be communicated to oncoming RN.

## 2023-12-06 NOTE — ED NOTES
Pt resting comfortably at this time. No concerns or needs voiced from pt. Pt provided with food and drink from sitter.

## 2023-12-06 NOTE — ED NOTES

## 2023-12-06 NOTE — H&P
[facility]    History and Physical  Medical Services       Date of Admission:  12/5/2023  Date of Service (when I saw the patient): 12/06/23    Assessment & Plan     Principal Problem:    Suicidal ideation    Active Medical Problems:    Chronic midline thoracic back pain- denies new or worsening back pain. Reports the same back pain since she was teenager. Denies any injuries. Denies bowel or bladder incontinence. Reports she uses tylenol and ibuprofen as needed. Tylenol and ibuprofen ordered as needed.     Constipation- denies abdominal pain. Reports last bowel movement 2 days ago. She reports she eats prunes to stay regular. Would like to try stool softener.  Colace scheduled daily. Pt encouraged to increase water intake. Nursing to continue to monitor and consult for new or worsening symptoms.     Vitamin D deficiency- vitamin D level 20 on 9/20/23. She was started on 50,000 units weekly for 6 weeks. Which she reports she completed. Then start 200 units daily. Home dose of 2000 units daily ordered.     Vitamin B12 deficiency- vitamin B12 level 199. She reports she just had her first injection on 12/4. She is to get weekly for one month and then monthly. Monday injections ordered.     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 She had last follow up on 9/20/23. TSH is elevated at 6.55, T4 wnl. She is on synthroid, but reports she misses doses. I suspect this is why her TSH is elevated. She follows with endocrinology as well. She reports the doctor missed the last telehealth appointment in August, but they communicated via [employer]. She reports she will reschedule her appointment with endocrinology. She denies difficulty swallowing, lumps in throat, or any concerns. Home dose of synthroid ordered.       Pt medically stable, no acute medical concerns. Chronic medical problems stable. Will sign off. Please consult for any new medical issues or concerns.           Code  Status: Full Code    Laney Rader, CNP    Primary Care Physician   Zay Carlos    Chief Complaint   Psych evaluation     History is obtained from the patient and electronic health record    History of Present Illness   (Per ED) Ysabel David is a 33 year old female who presents from home with partner for concern related to mental health.  She is followed closely by [facility].  She was seen yesterday.  She has been having increasing depressive symptoms and thoughts of suicide.  She has had previous inpatient mental health hospitalizations.  Her diagnoses include ADD, PTSD and depression.  She does not think she has ever had manic type symptoms.  Her partner corroborates this.  She denies any chance of pregnancy.  Denies any recent or current substance abuse. Not currently on any medications for her symptoms. Recently restarted therapy after a hiatus due to social issues. She has several recent stressors.     Past Medical History    I have reviewed this patient's medical history and updated it with pertinent information if needed.   Past Medical History:   Diagnosis Date    Borderline personality disorder (H)     ?    Major depressive disorder, single episode     suicidal ideation, hospitalized x 3 in 2012    Personal history of other medical treatment (CODE)     Age 14       Past Surgical History   I have reviewed this patient's surgical history and updated it with pertinent information if needed.  Past Surgical History:   Procedure Laterality Date    APPENDECTOMY OPEN      06/14/2017,Lap Appy       Prior to Admission Medications   Prior to Admission Medications   Prescriptions Last Dose Informant Patient Reported? Taking?   QUEtiapine (SEROQUEL) 200 MG tablet Past Week at HS  No Yes   Sig: TAKE 1 TABLET BY MOUTH AT BEDTIME   QUEtiapine (SEROQUEL) 50 MG tablet Past Week at HS  No Yes   Sig: TAKE 3 to 4 TABLETS BY MOUTH EVERY DAY AT BEDTIME   Patient taking differently: Take 200 mg by mouth at bedtime  -alternates with the 200 mg tablet based on the sleep she needs.   Vitamin D2 (Ergocalciferol) 50 MCG ( UT) CAPS 2023 at AM  Yes Yes   Sig: Take 2 capsules by mouth daily   amphetamine-dextroamphetamine (ADDERALL XR) 30 MG 24 hr capsule   No No   Sig: Take 1 capsule (30 mg) by mouth daily for 30 days   amphetamine-dextroamphetamine (ADDERALL XR) 30 MG 24 hr capsule 2023 at AM  No Yes   Sig: Take 1 capsule (30 mg) by mouth daily for 30 days   amphetamine-dextroamphetamine (ADDERALL) 30 MG tablet Past Week at PM  No Yes   Sig: Take 1 tablet (30 mg) by mouth daily for 30 days   Patient taking differently: Take 30 mg by mouth daily as needed (takes most days in the afternoon)   amphetamine-dextroamphetamine (ADDERALL) 30 MG tablet   No No   Sig: Take 1 tablet (30 mg) by mouth daily for 30 days   etonogestrel (NEXPLANON) 68 MG IMPL 2023  Yes Yes   Si each by Subdermal route once   fish oil-omega-3 fatty acids 1000 MG capsule 2023 at AM  Yes Yes   Sig: Take 1 g by mouth every morning   levothyroxine (SYNTHROID/LEVOTHROID) 175 MCG tablet 2023 at AM  Yes Yes   Sig: Take 1 tablet by mouth daily before breakfast      Facility-Administered Medications Last Administration Doses Remaining   cyanocobalamin injection 1,000 mcg 2023  3:08 PM 3   cyanocobalamin injection 1,000 mcg None recorded 12        Allergies   No Known Allergies    Social History   I have reviewed this patient's social history and updated it with pertinent information if needed. Ysabel David  reports that she has never smoked. She has been exposed to tobacco smoke. She has never used smokeless tobacco. She reports current alcohol use. She reports current drug use. Drug: Marijuana.    Family History   I have reviewed this patient's family history and updated it with pertinent information if needed.   Family History   Problem Relation Age of Onset    Breast Cancer Maternal Grandmother         Cancer-breast    Other - See  Comments Brother         Psychiatric illness,Severe depression    Colon Cancer No family hx of         Cancer-colon    Prostate Cancer No family hx of         Cancer-prostate    Ovarian Cancer No family hx of         Cancer-ovarian    Blood Disease No family hx of         Blood Disease    Asthma No family hx of         Asthma    Heart Disease No family hx of         Heart Disease    Diabetes No family hx of         Diabetes    Hypertension No family hx of         Hypertension    Seizure Disorder No family hx of         Seizures    Thyroid Disease No family hx of         Thyroid Disease       Review of Systems   CONSTITUTIONAL:  negative  EYES:  negative  HEENT:  negative  RESPIRATORY:  negative  CARDIOVASCULAR:  negative  GASTROINTESTINAL:  negative except constipation   GENITOURINARY:  negative  INTEGUMENT/BREAST:  negative  HEMATOLOGIC/LYMPHATIC:  negative  ALLERGIC/IMMUNOLOGIC:  negative  ENDOCRINE:  negative  MUSCULOSKELETAL:  negative except chronic back pain   NEUROLOGICAL:  negative    Physical Exam   Temp: 97  F (36.1  C) Temp src: Tympanic BP: 117/67 Pulse: 83   Resp: 14 SpO2: 98 % O2 Device: None (Room air)    Vital Signs with Ranges  Temp:  [97  F (36.1  C)-97.7  F (36.5  C)] 97  F (36.1  C)  Pulse:  [] 83  Resp:  [14-18] 14  BP: (117-144)/(67-91) 117/67  SpO2:  [98 %-100 %] 98 %  188 lbs 0 oz    Constitutional: awake, alert, cooperative, no apparent distress, and appears stated age, vitals stable   Eyes: Lids and lashes normal, pupils equal, round and reactive to light, extra ocular muscles intact, sclera clear, conjunctiva normal  ENT: Normocephalic, without obvious abnormality, atraumatic, external ears without lesions, oral pharynx with moist mucous membranes, no erythema or exudates, gums normal  Hematologic / Lymphatic: no cervical lymphadenopathy  Respiratory: No increased work of breathing, good air exchange, clear to auscultation bilaterally, no crackles or wheezing  Cardiovascular: Normal  apical impulse, regular rate and rhythm, normal S1 and S2, no S3 or S4, and no murmur noted  GI: normal bowel sounds, soft, non-distended, non-tender, no masses palpated, no hepatosplenomegally  Genitounirinary: deferred  Skin: normal skin color, texture, turgor and no redness, warmth, or swelling  Musculoskeletal: There is no redness, warmth, or swelling of the joints.  Full range of motion noted.    Neurologic: Awake, alert, oriented to name, place and time.  Cranial nerves II-XII are grossly intact.    Neuropsychiatric: General: tearful, depressed, and normal eye contact    Data   Data reviewed today:   No lab results found in last 7 days.    No results found for this or any previous visit (from the past 24 hour(s)).

## 2023-12-07 VITALS
RESPIRATION RATE: 14 BRPM | WEIGHT: 188 LBS | TEMPERATURE: 98.4 F | HEIGHT: 68 IN | BODY MASS INDEX: 28.49 KG/M2 | HEART RATE: 100 BPM | OXYGEN SATURATION: 99 % | DIASTOLIC BLOOD PRESSURE: 80 MMHG | SYSTOLIC BLOOD PRESSURE: 124 MMHG

## 2023-12-07 PROCEDURE — 99239 HOSP IP/OBS DSCHRG MGMT >30: CPT | Mod: GT | Performed by: STUDENT IN AN ORGANIZED HEALTH CARE EDUCATION/TRAINING PROGRAM

## 2023-12-07 PROCEDURE — 250N000013 HC RX MED GY IP 250 OP 250 PS 637: Performed by: NURSE PRACTITIONER

## 2023-12-07 PROCEDURE — 999N000104 HC STATISTIC NO CHARGE

## 2023-12-07 RX ORDER — FLUOXETINE 40 MG/1
40 CAPSULE ORAL DAILY
Status: DISCONTINUED | OUTPATIENT
Start: 2023-12-08 | End: 2023-12-07 | Stop reason: HOSPADM

## 2023-12-07 RX ORDER — FLUOXETINE 40 MG/1
40 CAPSULE ORAL DAILY
Qty: 30 CAPSULE | Refills: 1 | Status: SHIPPED | OUTPATIENT
Start: 2023-12-08

## 2023-12-07 RX ORDER — TRAZODONE HYDROCHLORIDE 100 MG/1
100 TABLET ORAL
Qty: 30 TABLET | Refills: 1 | Status: SHIPPED | OUTPATIENT
Start: 2023-12-07 | End: 2024-04-01

## 2023-12-07 RX ORDER — DEXTROAMPHETAMINE SACCHARATE, AMPHETAMINE ASPARTATE, DEXTROAMPHETAMINE SULFATE AND AMPHETAMINE SULFATE 5; 5; 5; 5 MG/1; MG/1; MG/1; MG/1
20 TABLET ORAL DAILY PRN
Qty: 30 TABLET | Refills: 0 | Status: SHIPPED | OUTPATIENT
Start: 2023-12-07 | End: 2024-04-01 | Stop reason: DRUGHIGH

## 2023-12-07 RX ORDER — TRAZODONE HYDROCHLORIDE 100 MG/1
100 TABLET ORAL AT BEDTIME
Status: DISCONTINUED | OUTPATIENT
Start: 2023-12-07 | End: 2023-12-07 | Stop reason: HOSPADM

## 2023-12-07 RX ADMIN — Medication 100 MCG: at 08:36

## 2023-12-07 RX ADMIN — DEXTROAMPHETAMINE SACCHARATE, AMPHETAMINE ASPARTATE MONOHYDRATE, DEXTROAMPHETAMINE SULFATE, AMPHETAMINE SULFATE 30 MG: 2.5; 2.5; 2.5; 2.5 CAPSULE, EXTENDED RELEASE ORAL at 08:36

## 2023-12-07 RX ADMIN — DOCUSATE SODIUM 100 MG: 100 CAPSULE, LIQUID FILLED ORAL at 08:36

## 2023-12-07 RX ADMIN — HYDROXYZINE HYDROCHLORIDE 25 MG: 25 TABLET, FILM COATED ORAL at 03:07

## 2023-12-07 RX ADMIN — LEVOTHYROXINE SODIUM 175 MCG: 0.1 TABLET ORAL at 07:32

## 2023-12-07 RX ADMIN — FLUOXETINE 20 MG: 20 CAPSULE ORAL at 08:36

## 2023-12-07 RX ADMIN — OMEGA-3 FATTY ACIDS CAP 1000 MG 1 G: 1000 CAP at 08:36

## 2023-12-07 RX ADMIN — Medication 3 MG: at 03:07

## 2023-12-07 RX ADMIN — DEXTROAMPHETAMINE SACCHARATE, AMPHETAMINE ASPARTATE, DEXTROAMPHETAMINE SULFATE AND AMPHETAMINE SULFATE 20 MG: 1.25; 1.25; 1.25; 1.25 TABLET ORAL at 13:29

## 2023-12-07 ASSESSMENT — ACTIVITIES OF DAILY LIVING (ADL)
ADLS_ACUITY_SCORE: 30
ORAL_HYGIENE: INDEPENDENT
DRESS: SCRUBS (BEHAVIORAL HEALTH)
HYGIENE/GROOMING: INDEPENDENT
ORAL_HYGIENE: INDEPENDENT
HYGIENE/GROOMING: INDEPENDENT
ADLS_ACUITY_SCORE: 30
LAUNDRY: UNABLE TO COMPLETE
DRESS: SCRUBS (BEHAVIORAL HEALTH)

## 2023-12-07 NOTE — DISCHARGE SUMMARY
[facility] PSYCHIATRY  DISCHARGE SUMMARY     DISCHARGE DATA     Ysabel David MRN# [id number]   Age: 33 year old YOB: 1990     Date of Admission: 12/5/2023  Date of Discharge: 12/7/2023  Discharge Provider: Home Muñiz DO       REASON FOR ADMISSION     This is a 33 year old female with a PMH of MDD, PTSD, ADHD, and borderline personality disorder who presents to the ED with significant other for evaluation of increased depression and suicidal thoughts. Had plan to fall asleep in he garage with vehicle running or take sleeping pills. Struggling with increased depression, feels that she has no energy or motivation, has been isolating more. Currently only taking Adderall for ADHD and Seroquel which she notes she takes for sleep and no longer feels is helpful. Patient did seem surprised to learn that Seroquel is an antipsychotic medication. She is interested in trying an antidepressant for treatment of depression and anxiety. Does admit that she will sometimes forget to take her daily medication. Did discuss Prozac having longer half life and less risk of withdrawal if doses missed. She is in agreement to try this medication. Also reviewed trying Trazodone at bedtime for sleep in place of Seroquel after review of risks, benefits, and side effects. Her TSH was slightly elevated on admit, with patient noting that she will forget to take synthroid at times, mainly because she cannot remember if she took it, then does not want to double dose. Did review trying a pill box that can be filled weekly and may help with this. She did recently get reestablished with her therapist, though is interested in seeing a psychiatric provider for medications.        DISCHARGE DIAGNOSES     1. Major depressive disorder, recurrent, severe  2.   PTSD, chronic  3.   History of ADHD  4.   R/o borderline personality disorder       CONSULTS     1. OT for bright light therapy        HOSPITAL COURSE   Psychiatric  Course:    Legal status: Orders Placed This Encounter      Voluntary    Patient was admitted to unit 5 due to the aforementioned presentation. The patient was placed under 15 minute checks to ensure patient safety. The patient participated in unit programming and groups as able.    Ms. David did not require seclusion/restraint during hospitalization.     We reviewed with Ms. David current and past medication trials including duration, dose, response and side effects. During this hospitalization, the following changes to the patient's psychotropic medications were made:    PTA psychotropic medications stopped:      -Seroquel 200 mg at bedtime- stopped per pt request, no longer helping for sleep     PTA psychotropic medications continued/changed:      -Adderall XR 30 mg daily with 20 mg IR      New medications initiated:     -Prozac 40 mg daily   -Trazodone 100 mg at bedtime prn sleep     The patient presented with depression and suicidal crisis. The above changes were made. She tolerated these changes well.      With these changes and supports the patient noticed improvement in their symptoms and felt sufficiently ready for discharge. She noticed that her suicide crisis resolved. She felt safe to return home requesting to leave.     As a result, Ysabel David was discharged. At the time of discharge, Ysabel David was determined to not be a danger to self or others. At the current time of discharge, the patient does not meet criteria for involuntary hospitalization. On the day of discharge, the patient reports that they do not have suicidal or homicidal ideation. Steps taken to minimize risk include: assessing patient s behavior and thought process daily during hospital stay, discharging patient with adequate plan for follow up for mental and physical health and discussing safety plan of returning to the hospital should the patient ever have thoughts of harming themselves or others. Therefore, based on all  available evidence including the factors cited above, the patient does not appear to be at imminent risk for self-harm, and is appropriate for outpatient level of care. However, if patient uses substances or is medication non-adherent, their risk of decompensation and SI will be elevated. This was discussed with the patient.       DISCHARGE MEDICATIONS     Current Discharge Medication List      START taking these medications    Details   FLUoxetine (PROZAC) 40 MG capsule Take 1 capsule (40 mg) by mouth daily  Qty: 30 capsule, Refills: 1    Associated Diagnoses: Major depressive disorder, recurrent severe without psychotic features (H)      traZODone (DESYREL) 100 MG tablet Take 1 tablet (100 mg) by mouth nightly as needed for sleep  Qty: 30 tablet, Refills: 1    Associated Diagnoses: Major depressive disorder, recurrent severe without psychotic features (H)         CONTINUE these medications which have CHANGED    Details   amphetamine-dextroamphetamine (ADDERALL) 20 MG tablet Take 1 tablet (20 mg) by mouth daily as needed (in afternoon for focus.)  Qty: 30 tablet, Refills: 0    Associated Diagnoses: Attention deficit hyperactivity disorder (ADHD), unspecified ADHD type         CONTINUE these medications which have NOT CHANGED    Details   amphetamine-dextroamphetamine (ADDERALL XR) 30 MG 24 hr capsule Take 1 capsule (30 mg) by mouth daily for 30 days  Qty: 30 capsule, Refills: 0    Associated Diagnoses: Attention deficit hyperactivity disorder (ADHD), unspecified ADHD type      etonogestrel (NEXPLANON) 68 MG IMPL 1 each by Subdermal route once      fish oil-omega-3 fatty acids 1000 MG capsule Take 1 g by mouth every morning      levothyroxine (SYNTHROID/LEVOTHROID) 175 MCG tablet Take 1 tablet by mouth daily before breakfast      Vitamin D2 (Ergocalciferol) 50 MCG (2000 UT) CAPS Take 2 capsules by mouth daily         STOP taking these medications       amphetamine-dextroamphetamine (ADDERALL) 30 MG tablet Comments:  "  Reason for Stopping:         amphetamine-dextroamphetamine (ADDERALL) 30 MG tablet Comments:   Reason for Stopping:         QUEtiapine (SEROQUEL) 200 MG tablet Comments:   Reason for Stopping:         QUEtiapine (SEROQUEL) 50 MG tablet Comments:   Reason for Stopping:                MENTAL STATUS EXAM   Vitals: /80 (BP Location: Right arm)   Pulse 100   Temp 98.4  F (36.9  C) (Tympanic)   Resp 14   Ht 1.727 m (5' 7.99\")   Wt 85.3 kg (188 lb)   LMP 10/31/2023 (Within Days)   SpO2 99%   BMI 28.59 kg/m      Appearance: Alert, oriented, dressed in hospital scrubs, appears stated age   Attitude: Cooperative   Eye Contact: Good  Mood: \"Better\"  Affect: Full range of affect  Speech: Normal rate and rhythm   Psychomotor Behavior: No tremor, rigidity, or psychomotor abnormality   Thought Process: Logical, goal directed   Associations: No loose associations   Thought Content: Denies SI or plan. No SIB. Denies A/V hallucinations. No evidence of delusional thought.  Insight: Fair  Judgment: Fair  Oriented to: Person, place, and time  Attention Span and Concentration: Intact  Recent and Remote Memory: Intact  Language: English with appropriate syntax and vocabulary  Fund of Knowledge: Average  Muscle Strength and Tone: Grossly normal  Gait and Station: Grossly normal       DISCHARGE PLAN     1.  Education given regarding diagnostic and treatment options with risks, benefits and alternatives with adequate verbalization of understanding.  2.  Discharge to home with patient to arrange outpatient services that were started. Upon detailed review of risk factors, patient amenable for release.   3.  Continue aforementioned medications and associated medication changes with follow-up by outpatient provider.  4.  Crisis management planning in place.    5.  Nursing and  to review further discharge recommendations.   6.  Patient is being discharged with the following appointments:    Patient to schedule on " her own through [facility] with process started.         TREATMENT TEAM CARE PLAN     Progress: Improved.    Continued Stay Criteria/Rationale: Discharge today.    Medical/Physical: No acute issues today.    Precautions: Status 15    Plan: Discharge    Rationale for change in precautions or plan: Discharge.    Participants: Home Muñiz, DO, Nursing, SW, OT.    The patient's care was discussed with the treatment team and chart notes were reviewed.       DISCHARGE SERVICES PROVIDED     50 minutes spent on discharge services, including:  Final examination of patient.  Review and discussion of hospital stay.  Instructions for continued outpatient care/goals.  Preparation of discharge records.  Preparation of medications refills and new prescriptions.  Preparation of applicable referral forms.        ATTESTATION     Dr. Home Muñiz  Psychiatrist     VIDEO VISIT    Patient has given verbal consent for video visit?: Yes     Video- Visit Details  Type of service:  video visit for mental health treatment.  Time of service:    Date:  12/07/2023    Video Start Time:0615 PM  Video End Time: 0645 PM    [place] visit[place]  Originating Site (patient location):  [facility]  Distant Site (provider location):  Remote location  Mode of Communication:  Video Conference via [employer] THIS ADMISSION     No results found for any visits on 12/05/23.

## 2023-12-07 NOTE — PLAN OF CARE
Observed resting with eyes closed, respirations even, unlabored and WNL. Able to reposition self and make needs known. 15-minute checks in place. Slept approximately 4 hours. Anxious and unable to fall back to sleep; Administered PRN PO atarax 25 mg and melatonin 3 mg PRN PO for anxiety and sleep promotion.    Problem: Adult Behavioral Health Plan of Care  Goal: Patient-Specific Goal (Individualization)  Description: Pt will attend at least 50% of groups.  Pt will eat at least 50% of meals.  Pt will sleep at least 6-8 hours per night.  Pt will be compliant with ordered medications and treatment team recommendations.   Pt. Ok to utilize SAD lamp 1 x daily up to 20 minutesv with staff present, prior to noon.   Outcome: Progressing   Goal Outcome Evaluation:

## 2023-12-07 NOTE — PLAN OF CARE
Discharge Note    Patient Discharged to home on 12/7/2023 7:09 PM via Private Car accompanied by unit assistance walks to awaiting car. .     Patient informed of discharge instructions in AVS. patient verbalizes understanding and denies having any questions pertaining to AVS. Patient stable at time of discharge. Patient denies SI, HI, and thoughts of self harm at time of discharge. All personal belongings returned to patient. Discharge prescriptions sent to [facility] in [place] via electronic communication.     Indiana Kay RN  12/7/2023  5:35 PM    Problem: Adult Behavioral Health Plan of Care  Goal: Plan of Care Review  Outcome: Met  Flowsheets (Taken 12/7/2023 1700)  Patient Agreement with Plan of Care: (wants to discharge with her services in place.) agrees with comment (describe)  Goal: Patient-Specific Goal (Individualization)  Description: Pt will attend at least 50% of groups.  Pt will eat at least 50% of meals.  Pt will sleep at least 6-8 hours per night.  Pt will be compliant with ordered medications and treatment team recommendations.   Pt. Ok to utilize SAD lamp 1 x daily up to 20 minutesv with staff present, prior to noon.   Outcome: Met    Pt sitting in lounge-appears anxious.  Ask pt if she would like prn medication, attend a group or music to help. Pt asks what medications re ordered. This writer sits with pt and goes over meds. Pt then requests 12 hour consent-states she cannot sleep good an feels she is not getting benefit as her stress levels are increased with hospitalization. Denies current SI-states she has had chronic thoughts much of her life.   Pt  talks with MD who discharges pt tonight with significant other to .   Goal: Adheres to Safety Considerations for Self and Others  Outcome: Met  Intervention: Develop and Maintain Individualized Safety Plan  Recent Flowsheet Documentation  Taken 12/7/2023 1700 by Indiana Kay, RN  Safety Measures:   environmental rounds  completed   safety rounds completed   suicide assessment completed  Goal: Absence of New-Onset Illness or Injury  Outcome: Met  Intervention: Identify and Manage Fall Risk  Recent Flowsheet Documentation  Taken 12/7/2023 1700 by Indiana Kay, RN  Safety Measures:   environmental rounds completed   safety rounds completed   suicide assessment completed  Goal: Optimized Coping Skills in Response to Life Stressors  Outcome: Met  Goal: Develops/Participates in Therapeutic [place] to Support Successful Transition  Outcome: Met     Problem: Suicide Risk  Goal: Absence of Self-Harm  Description: Pt will be free from self harm during hospital stay.  Pt will verbalize decrease depression and suicide ideation by discharge.  Pt will verbalize suicide prevention plan by discharge.    Outcome: Met  Intervention: Promote Psychosocial Wellbeing  Recent Flowsheet Documentation  Taken 12/7/2023 1700 by Indiana Kay, RN  Family/Support System Care: self-care encouraged   Goal Outcome Evaluation:    Plan of Care Reviewed With: patient

## 2023-12-07 NOTE — PROGRESS NOTES
Spoke with Niyah Hammer about setting up psychiatry. They are faxing over consents for her to sign and fax back. Once those are complete I can set up an appointment for her.

## 2023-12-07 NOTE — PLAN OF CARE
Writer took over care of pt at 1730.    Problem: Adult Behavioral Health Plan of Care  Goal: Patient-Specific Goal (Individualization)  Description: Pt will attend at least 50% of groups.  Pt will eat at least 50% of meals.  Pt will sleep at least 6-8 hours per night.  Pt will be compliant with ordered medications and treatment team recommendations.   Pt. Ok to utilize SAD lamp 1 x daily up to 20 minutesv with staff present, prior to noon.   Outcome: Progressing   Calm and cooperative. Medication compliant. Pleasant and appropriate during conversation. Reports depression. Sad affect. During administration of HS Trazodone pt voiced concern about not being able to sleep through the night. Writer encouraged pt to alert staff if she is unable to sleep. Pt acknowledged understanding. No reports of pain.     Problem: Suicide Risk  Goal: Absence of Self-Harm  Description: Pt will be free from self harm during hospital stay.  Pt will verbalize decrease depression and suicide ideation by discharge.  Pt will verbalize suicide prevention plan by discharge.  Outcome: Progressing   Free from self harm or injury this shift.     Face to face end of shift report to be communicated to oncoming RN.

## 2023-12-07 NOTE — PLAN OF CARE
Problem: Suicide Risk  Goal: Absence of Self-Harm  Description: Pt will be free from self harm during hospital stay.  Pt will verbalize decrease depression and suicide ideation by discharge.  Pt will verbalize suicide prevention plan by discharge.    Outcome: Progressing     Problem: Adult Behavioral Health Plan of Care  Goal: Patient-Specific Goal (Individualization)  Description: Pt will attend at least 50% of groups.  Pt will eat at least 50% of meals.  Pt will sleep at least 6-8 hours per night.  Pt will be compliant with ordered medications and treatment team recommendations.   Pt. Ok to utilize SAD lamp 1 x daily up to 20 minutesv with staff present, prior to noon.   Outcome: Progressing     Patient has been calm, cooperative, and medication compliant this shift.  She spent time in the lounge and attended groups.  Affect is a little flat.  Denies any thoughts of suicide at this time and states she will be safe while on the unit.  Happy to have her medications reordered.  No complaints of pain.  VS WNL.  Face to face end of shift report communicated to evening shift RN.     Regine Lucas RN  12/7/2023  12:33 PM

## 2023-12-11 ENCOUNTER — MYC MEDICAL ADVICE (OUTPATIENT)
Dept: FAMILY MEDICINE | Facility: OTHER | Age: 33
End: 2023-12-11
Payer: COMMERCIAL

## 2023-12-11 ENCOUNTER — ALLIED HEALTH/NURSE VISIT (OUTPATIENT)
Dept: FAMILY MEDICINE | Facility: OTHER | Age: 33
End: 2023-12-11
Attending: FAMILY MEDICINE
Payer: COMMERCIAL

## 2023-12-11 DIAGNOSIS — E53.8 B12 DEFICIENCY: Primary | ICD-10-CM

## 2023-12-11 PROCEDURE — 96372 THER/PROPH/DIAG INJ SC/IM: CPT

## 2023-12-11 PROCEDURE — 250N000011 HC RX IP 250 OP 636

## 2023-12-11 RX ADMIN — CYANOCOBALAMIN 1000 MCG: 1000 INJECTION, SOLUTION INTRAMUSCULAR; SUBCUTANEOUS at 10:07

## 2023-12-11 NOTE — PROGRESS NOTES

## 2023-12-17 ENCOUNTER — HEALTH MAINTENANCE LETTER (OUTPATIENT)
Age: 33
End: 2023-12-17

## 2023-12-20 ENCOUNTER — ALLIED HEALTH/NURSE VISIT (OUTPATIENT)
Dept: FAMILY MEDICINE | Facility: OTHER | Age: 33
End: 2023-12-20
Payer: COMMERCIAL

## 2023-12-20 DIAGNOSIS — E53.8 B12 DEFICIENCY: Primary | ICD-10-CM

## 2023-12-20 PROCEDURE — 250N000011 HC RX IP 250 OP 636

## 2023-12-20 PROCEDURE — 96372 THER/PROPH/DIAG INJ SC/IM: CPT

## 2023-12-20 RX ADMIN — CYANOCOBALAMIN 1000 MCG: 1000 INJECTION, SOLUTION INTRAMUSCULAR; SUBCUTANEOUS at 12:57

## 2023-12-20 NOTE — PROGRESS NOTES

## 2023-12-21 DIAGNOSIS — G47.00 INSOMNIA, UNSPECIFIED TYPE: ICD-10-CM

## 2023-12-21 RX ORDER — QUETIAPINE FUMARATE 50 MG/1
TABLET, FILM COATED ORAL
Qty: 40 TABLET | Refills: 0 | OUTPATIENT
Start: 2023-12-21

## 2023-12-21 RX ORDER — QUETIAPINE FUMARATE 200 MG/1
TABLET, FILM COATED ORAL
Qty: 30 TABLET | Refills: 0 | OUTPATIENT
Start: 2023-12-21

## 2023-12-21 NOTE — TELEPHONE ENCOUNTER
[id number] of [place] sent Rx request for the following:     QUEtiapine (SEROQUEL) 200 MG tablet (Discontinued) 90 tablet 11 11/2/2023 12/7/2023 No   Sig: TAKE 1 TABLET BY MOUTH AT BEDTIME   Sent to pharmacy as: QUEtiapine Fumarate 200 MG Oral Tablet (SEROquel)   Class: E-Prescribe   Reason for Discontinue: Stop at Discharge     QUEtiapine (SEROQUEL) 50 MG tablet (Discontinued) 120 tablet 11 11/2/2023 12/7/2023 No   Sig: TAKE 3 to 4 TABLETS BY MOUTH EVERY DAY AT BEDTIME   Patient taking differently: Take 200 mg by mouth at bedtime -alternates with the 200 mg tablet based on the sleep she needs.   Sent to pharmacy as: QUEtiapine Fumarate 50 MG Oral Tablet (SEROquel)   Class: E-Prescribe   Reason for Discontinue: Stop at Discharge     Pharmacy informed of discontinuation. Renae Munoz RN .............. 12/21/2023  3:39 PM

## 2023-12-28 ENCOUNTER — ALLIED HEALTH/NURSE VISIT (OUTPATIENT)
Dept: FAMILY MEDICINE | Facility: OTHER | Age: 33
End: 2023-12-28
Payer: COMMERCIAL

## 2023-12-28 DIAGNOSIS — E53.8 B12 DEFICIENCY: Primary | ICD-10-CM

## 2023-12-28 PROCEDURE — 250N000011 HC RX IP 250 OP 636: Performed by: FAMILY MEDICINE

## 2023-12-28 PROCEDURE — 96372 THER/PROPH/DIAG INJ SC/IM: CPT | Performed by: FAMILY MEDICINE

## 2023-12-28 RX ADMIN — CYANOCOBALAMIN 1000 MCG: 1000 INJECTION, SOLUTION INTRAMUSCULAR; SUBCUTANEOUS at 12:51

## 2023-12-28 NOTE — PROGRESS NOTES

## 2024-02-02 DIAGNOSIS — Z51.81 ENCOUNTER FOR THERAPEUTIC DRUG MONITORING: Primary | ICD-10-CM

## 2024-02-26 DIAGNOSIS — G47.00 INSOMNIA, UNSPECIFIED TYPE: ICD-10-CM

## 2024-02-28 ENCOUNTER — ALLIED HEALTH/NURSE VISIT (OUTPATIENT)
Dept: FAMILY MEDICINE | Facility: OTHER | Age: 34
End: 2024-02-28
Attending: FAMILY MEDICINE
Payer: COMMERCIAL

## 2024-02-28 DIAGNOSIS — E53.8 B12 DEFICIENCY: Primary | ICD-10-CM

## 2024-02-28 PROCEDURE — 250N000011 HC RX IP 250 OP 636: Performed by: FAMILY MEDICINE

## 2024-02-28 PROCEDURE — 96372 THER/PROPH/DIAG INJ SC/IM: CPT | Performed by: FAMILY MEDICINE

## 2024-02-28 RX ORDER — CYANOCOBALAMIN 1000 UG/ML
1000 INJECTION, SOLUTION INTRAMUSCULAR; SUBCUTANEOUS
Status: ACTIVE | OUTPATIENT
Start: 2024-02-28

## 2024-02-28 RX ORDER — QUETIAPINE FUMARATE 50 MG/1
TABLET, FILM COATED ORAL
Qty: 40 TABLET | Refills: 0 | OUTPATIENT
Start: 2024-02-28

## 2024-02-28 RX ADMIN — CYANOCOBALAMIN 1000 MCG: 1000 INJECTION, SOLUTION INTRAMUSCULAR; SUBCUTANEOUS at 12:54

## 2024-02-28 NOTE — TELEPHONE ENCOUNTER
[id number] of [place] sent Rx request for the followin23 Discontinue Zay Carlos MD Reason:Reorder (No AVS)     Outpatient Medication Detail     Disp Refills Start End MOISES   QUEtiapine (SEROQUEL) 50 MG tablet (Discontinued) 120 tablet 0 10/19/2023 2023 No   Sig: TAKE 3 TO 4 TABLETS BY MOUTH EVERY DAY AT BEDTIME     Pharmacy notified. Renae Munoz RN .............. 2024  11:10 AM

## 2024-02-28 NOTE — PROGRESS NOTES

## 2024-03-04 ENCOUNTER — LAB (OUTPATIENT)
Dept: LAB | Facility: OTHER | Age: 34
End: 2024-03-04
Attending: FAMILY MEDICINE
Payer: COMMERCIAL

## 2024-03-04 DIAGNOSIS — Z51.81 ENCOUNTER FOR THERAPEUTIC DRUG MONITORING: ICD-10-CM

## 2024-03-04 LAB
ALBUMIN SERPL BCG-MCNC: 4.7 G/DL (ref 3.5–5.2)
ALP SERPL-CCNC: 69 U/L (ref 40–150)
ALT SERPL W P-5'-P-CCNC: 15 U/L (ref 0–50)
ANION GAP SERPL CALCULATED.3IONS-SCNC: 9 MMOL/L (ref 7–15)
AST SERPL W P-5'-P-CCNC: 12 U/L (ref 0–45)
BASOPHILS # BLD AUTO: 0 10E3/UL (ref 0–0.2)
BASOPHILS NFR BLD AUTO: 0 %
BILIRUB DIRECT SERPL-MCNC: <0.2 MG/DL (ref 0–0.3)
BILIRUB SERPL-MCNC: 0.3 MG/DL
BUN SERPL-MCNC: 12.9 MG/DL (ref 6–20)
CALCIUM SERPL-MCNC: 9.7 MG/DL (ref 8.6–10)
CHLORIDE SERPL-SCNC: 105 MMOL/L (ref 98–107)
CHOLEST SERPL-MCNC: 288 MG/DL
CREAT SERPL-MCNC: 0.93 MG/DL (ref 0.51–0.95)
DEPRECATED HCO3 PLAS-SCNC: 25 MMOL/L (ref 22–29)
EGFRCR SERPLBLD CKD-EPI 2021: 84 ML/MIN/1.73M2
EOSINOPHIL # BLD AUTO: 0.1 10E3/UL (ref 0–0.7)
EOSINOPHIL NFR BLD AUTO: 1 %
ERYTHROCYTE [DISTWIDTH] IN BLOOD BY AUTOMATED COUNT: 11.3 % (ref 10–15)
FASTING STATUS PATIENT QL REPORTED: ABNORMAL
GLUCOSE SERPL-MCNC: 94 MG/DL (ref 70–99)
HBA1C MFR BLD: 4.6 % (ref 4–6.2)
HCT VFR BLD AUTO: 41.6 % (ref 35–47)
HDLC SERPL-MCNC: 70 MG/DL
HGB BLD-MCNC: 15 G/DL (ref 11.7–15.7)
IMM GRANULOCYTES # BLD: 0 10E3/UL
IMM GRANULOCYTES NFR BLD: 0 %
LDLC SERPL CALC-MCNC: ABNORMAL MG/DL
LYMPHOCYTES # BLD AUTO: 2.3 10E3/UL (ref 0–5.3)
LYMPHOCYTES NFR BLD AUTO: 33 %
MCH RBC QN AUTO: 33.5 PG (ref 26.5–33)
MCHC RBC AUTO-ENTMCNC: 36.1 G/DL (ref 31.5–36.5)
MCV RBC AUTO: 93 FL (ref 78–100)
MONOCYTES # BLD AUTO: 0.5 10E3/UL (ref 0–1.3)
MONOCYTES NFR BLD AUTO: 7 %
NEUTROPHILS # BLD AUTO: 4.1 10E3/UL (ref 1.6–8.3)
NEUTROPHILS NFR BLD AUTO: 59 %
NONHDLC SERPL-MCNC: 218 MG/DL
NRBC # BLD AUTO: 0 10E3/UL
NRBC BLD AUTO-RTO: 0 /100
PLATELET # BLD AUTO: 204 10E3/UL (ref 150–450)
POTASSIUM SERPL-SCNC: 4.1 MMOL/L (ref 3.4–5.3)
PROT SERPL-MCNC: 7.2 G/DL (ref 6.4–8.3)
RBC # BLD AUTO: 4.48 10E6/UL (ref 3.8–5.2)
SODIUM SERPL-SCNC: 139 MMOL/L (ref 135–145)
TRIGL SERPL-MCNC: 418 MG/DL
TSH SERPL DL<=0.005 MIU/L-ACNC: 11.91 UIU/ML (ref 0.3–4.2)
WBC # BLD AUTO: 6.9 10E3/UL (ref 4–11)

## 2024-03-04 PROCEDURE — 84443 ASSAY THYROID STIM HORMONE: CPT | Mod: ZL

## 2024-03-04 PROCEDURE — 85048 AUTOMATED LEUKOCYTE COUNT: CPT | Mod: ZL

## 2024-03-04 PROCEDURE — 83036 HEMOGLOBIN GLYCOSYLATED A1C: CPT | Mod: ZL

## 2024-03-04 PROCEDURE — 36415 COLL VENOUS BLD VENIPUNCTURE: CPT | Mod: ZL

## 2024-03-04 PROCEDURE — 80053 COMPREHEN METABOLIC PANEL: CPT | Mod: ZL

## 2024-03-04 PROCEDURE — 80061 LIPID PANEL: CPT | Mod: ZL

## 2024-03-04 PROCEDURE — 84481 FREE ASSAY (FT-3): CPT | Mod: ZL

## 2024-03-06 LAB — T3FREE SERPL-MCNC: 1.7 PG/ML (ref 2–4.4)

## 2024-03-25 ENCOUNTER — ALLIED HEALTH/NURSE VISIT (OUTPATIENT)
Dept: FAMILY MEDICINE | Facility: OTHER | Age: 34
End: 2024-03-25
Attending: FAMILY MEDICINE
Payer: COMMERCIAL

## 2024-03-25 ENCOUNTER — OFFICE VISIT (OUTPATIENT)
Dept: OBGYN | Facility: OTHER | Age: 34
End: 2024-03-25
Payer: COMMERCIAL

## 2024-03-25 VITALS
HEART RATE: 90 BPM | BODY MASS INDEX: 30.57 KG/M2 | DIASTOLIC BLOOD PRESSURE: 64 MMHG | OXYGEN SATURATION: 99 % | WEIGHT: 201 LBS | SYSTOLIC BLOOD PRESSURE: 126 MMHG

## 2024-03-25 DIAGNOSIS — Z30.46 ENCOUNTER FOR REMOVAL AND REINSERTION OF NEXPLANON: Primary | ICD-10-CM

## 2024-03-25 DIAGNOSIS — Z01.812 PRE-PROCEDURE LAB EXAM: ICD-10-CM

## 2024-03-25 DIAGNOSIS — E53.8 B12 DEFICIENCY: Primary | ICD-10-CM

## 2024-03-25 PROCEDURE — 96372 THER/PROPH/DIAG INJ SC/IM: CPT | Performed by: FAMILY MEDICINE

## 2024-03-25 PROCEDURE — G0463 HOSPITAL OUTPT CLINIC VISIT: HCPCS | Mod: 25

## 2024-03-25 PROCEDURE — 250N000011 HC RX IP 250 OP 636: Performed by: FAMILY MEDICINE

## 2024-03-25 RX ORDER — LIDOCAINE HCL/EPINEPHRINE/PF 2%-1:200K
3 VIAL (ML) INJECTION ONCE
Status: DISCONTINUED | OUTPATIENT
Start: 2024-03-25 | End: 2024-03-25

## 2024-03-25 RX ADMIN — CYANOCOBALAMIN 1000 MCG: 1000 INJECTION, SOLUTION INTRAMUSCULAR; SUBCUTANEOUS at 13:23

## 2024-03-25 ASSESSMENT — PATIENT HEALTH QUESTIONNAIRE - PHQ9
SUM OF ALL RESPONSES TO PHQ QUESTIONS 1-9: 12
SUM OF ALL RESPONSES TO PHQ QUESTIONS 1-9: 12
10. IF YOU CHECKED OFF ANY PROBLEMS, HOW DIFFICULT HAVE THESE PROBLEMS MADE IT FOR YOU TO DO YOUR WORK, TAKE CARE OF THINGS AT HOME, OR GET ALONG WITH OTHER PEOPLE: SOMEWHAT DIFFICULT

## 2024-03-25 ASSESSMENT — PAIN SCALES - GENERAL: PAINLEVEL: NO PAIN (0)

## 2024-03-25 NOTE — PROGRESS NOTES

## 2024-03-29 ENCOUNTER — OFFICE VISIT (OUTPATIENT)
Dept: OBGYN | Facility: OTHER | Age: 34
End: 2024-03-29
Payer: COMMERCIAL

## 2024-03-29 VITALS
HEART RATE: 82 BPM | SYSTOLIC BLOOD PRESSURE: 142 MMHG | BODY MASS INDEX: 31.18 KG/M2 | WEIGHT: 205 LBS | DIASTOLIC BLOOD PRESSURE: 82 MMHG

## 2024-03-29 DIAGNOSIS — Z30.46 ENCOUNTER FOR REMOVAL AND REINSERTION OF NEXPLANON: Primary | ICD-10-CM

## 2024-03-29 DIAGNOSIS — Z01.812 PRE-PROCEDURE LAB EXAM: ICD-10-CM

## 2024-03-29 LAB — HCG UR QL: NEGATIVE

## 2024-03-29 PROCEDURE — 11981 INSERTION DRUG DLVR IMPLANT: CPT

## 2024-03-29 PROCEDURE — 81025 URINE PREGNANCY TEST: CPT | Mod: ZL

## 2024-03-29 PROCEDURE — 250N000011 HC RX IP 250 OP 636

## 2024-03-29 PROCEDURE — 11983 REMOVE/INSERT DRUG IMPLANT: CPT

## 2024-03-29 PROCEDURE — G0463 HOSPITAL OUTPT CLINIC VISIT: HCPCS | Mod: 25

## 2024-03-29 PROCEDURE — 250N000009 HC RX 250

## 2024-03-29 RX ORDER — LIDOCAINE HCL/EPINEPHRINE/PF 2%-1:200K
3 VIAL (ML) INJECTION ONCE
Status: COMPLETED | OUTPATIENT
Start: 2024-03-29 | End: 2024-03-29

## 2024-03-29 RX ADMIN — ETONOGESTREL 68 MG: 68 IMPLANT SUBCUTANEOUS at 11:48

## 2024-03-29 RX ADMIN — LIDOCAINE HYDROCHLORIDE,EPINEPHRINE BITARTRATE 3 ML: 20; .005 INJECTION, SOLUTION EPIDURAL; INFILTRATION; INTRACAUDAL; PERINEURAL at 11:48

## 2024-03-29 NOTE — NURSING NOTE
Chief Complaint   Patient presents with    Procedure     Nexplanon removal and replacement.        Medication Reconciliation: complete        Izabel Desouza LPN

## 2024-03-29 NOTE — PROGRESS NOTES
Procedure Note-Nexplanon Removal & Reinsertion    Ysabel David is here for removal & reinsertion of etonogestrel implant Nexplanon/Implanon    Indication: contraception    Consent: Affirmation of informed consent was signed and scanned into the medical record. Risks, benefits and alternatives were discussed. Patient's questions were elicited and answered.   Procedure safety checklist was completed:  Yes  Time Out (Pause for the Cause) completed: Yes      Preoperative Diagnosis: etonogestrel implant  Postoperative Diagnosis: etonogestrel implant removed & reinserted    Technique: On the left arm  Skin prep:  Chloraprep  Anesthesia 2% lidocaine, with epi  Procedure: Small incision (<5mm) was made at distal end of palpable implant, curved hemostat was used to isolate the implant and bring it to the incision, the fibrous capsule containing the implant  was incised and the Implant was removed intact.  EBL: minimal  Complications:  No    Nexplanon device visualized in applicator by patient and provider.  Skin punctured with applicator at insertion site where previous Nexplanon was removed and advanced with ease in the intradermal space.  Applicator was removed.  Nexplanon was palpated by provider and patient.    Small amount of bleeding noted at insertion site. Bandage and pressure dressing applied to insertion site.       Patient tolerated procedure well.  Lot number [id number] , Exp 11/30/2025 .  To be removed/replaced by 03/2027.    Written and verbal instructions provided to patient.    MORRO Hanson  OB/GYN  3/29/2024 11:54 AM

## 2024-04-01 ENCOUNTER — OFFICE VISIT (OUTPATIENT)
Dept: FAMILY MEDICINE | Facility: OTHER | Age: 34
End: 2024-04-01
Attending: FAMILY MEDICINE
Payer: COMMERCIAL

## 2024-04-01 VITALS
BODY MASS INDEX: 32.02 KG/M2 | WEIGHT: 204 LBS | HEART RATE: 99 BPM | SYSTOLIC BLOOD PRESSURE: 138 MMHG | OXYGEN SATURATION: 98 % | DIASTOLIC BLOOD PRESSURE: 84 MMHG | RESPIRATION RATE: 16 BRPM | HEIGHT: 67 IN | TEMPERATURE: 97.9 F

## 2024-04-01 DIAGNOSIS — E89.0 POSTOPERATIVE HYPOTHYROIDISM: ICD-10-CM

## 2024-04-01 DIAGNOSIS — F43.21 GRIEF REACTION: ICD-10-CM

## 2024-04-01 DIAGNOSIS — D48.5 NEOPLASM OF UNCERTAIN BEHAVIOR OF SKIN: ICD-10-CM

## 2024-04-01 DIAGNOSIS — D34 HURTHLE CELL NEOPLASM OF THYROID: ICD-10-CM

## 2024-04-01 DIAGNOSIS — R20.8 BURNING SENSATION OF FEET: ICD-10-CM

## 2024-04-01 DIAGNOSIS — Z00.00 VISIT FOR PREVENTIVE HEALTH EXAMINATION: Primary | ICD-10-CM

## 2024-04-01 PROCEDURE — G0463 HOSPITAL OUTPT CLINIC VISIT: HCPCS

## 2024-04-01 PROCEDURE — 99215 OFFICE O/P EST HI 40 MIN: CPT | Mod: 25 | Performed by: FAMILY MEDICINE

## 2024-04-01 PROCEDURE — 99395 PREV VISIT EST AGE 18-39: CPT | Performed by: FAMILY MEDICINE

## 2024-04-01 RX ORDER — QUETIAPINE FUMARATE 200 MG/1
200-400 TABLET, FILM COATED ORAL AT BEDTIME
COMMUNITY
Start: 2024-03-06

## 2024-04-01 RX ORDER — DEXTROAMPHETAMINE SACCHARATE, AMPHETAMINE ASPARTATE, DEXTROAMPHETAMINE SULFATE AND AMPHETAMINE SULFATE 7.5; 7.5; 7.5; 7.5 MG/1; MG/1; MG/1; MG/1
TABLET ORAL
COMMUNITY
Start: 2024-03-26

## 2024-04-01 RX ORDER — DEXTROAMPHETAMINE SACCHARATE, AMPHETAMINE ASPARTATE MONOHYDRATE, DEXTROAMPHETAMINE SULFATE AND AMPHETAMINE SULFATE 7.5; 7.5; 7.5; 7.5 MG/1; MG/1; MG/1; MG/1
CAPSULE, EXTENDED RELEASE ORAL
COMMUNITY
Start: 2024-03-26

## 2024-04-01 RX ORDER — LEVOTHYROXINE SODIUM 200 UG/1
200 TABLET ORAL
Qty: 90 TABLET | Refills: 4 | Status: SHIPPED | OUTPATIENT
Start: 2024-04-01

## 2024-04-01 ASSESSMENT — PAIN SCALES - GENERAL: PAINLEVEL: NO PAIN (0)

## 2024-04-01 NOTE — PROGRESS NOTES
Preventive Care Visit  [facility]  Zay Carlos MD, Family Medicine  Apr 1, 2024      Diagnoses and associated orders for this visit:  Visit for preventive health examination    Hurthle cell neoplasm of thyroid  -     levothyroxine (SYNTHROID/LEVOTHROID) 200 MCG tablet; Take 1 tablet (200 mcg) by mouth daily before breakfast  -     TSH with free T4 reflex; Future    Postoperative hypothyroidism  -     levothyroxine (SYNTHROID/LEVOTHROID) 200 MCG tablet; Take 1 tablet (200 mcg) by mouth daily before breakfast  -     TSH with free T4 reflex; Future    Neoplasm of uncertain behavior of skin  -     Adult Dermatology  Referral; Future    Burning sensation of feet  -     EMG; Future    Grief reaction    Other orders  -     QUEtiapine (SEROQUEL) 200 MG tablet; Take 200-400 mg by mouth at bedtime  -     amphetamine-dextroamphetamine (ADDERALL XR) 30 MG 24 hr capsule  -     amphetamine-dextroamphetamine (ADDERALL) 30 MG tablet        Zackery Bright is a 33 year old, presenting for the following:  Physical        4/1/2024     3:04 PM   Additional Questions   Roomed by Indiana Gutiérrez LPN        Health Care Directive  Patient does not have a Health Care Directive or Living Will: Discussed advance care planning with patient; information given to patient to review.    HPI               No data to display                   No data to display                   No data to display                  11/2/2023   Social Factors   Worry food won't last until get money to buy more No   Food not last or not have enough money for food? No   Do you have housing?  Yes   Are you worried about losing your housing? No   Lack of transportation? No   Unable to get utilities (heat,electricity)? No          No data to display                     Today's PHQ-9 Score:       3/29/2024    11:17 AM   PHQ-9 SCORE   PHQ-9 Total Score MyChart 12 (Moderate depression)   PHQ-9 Total Score 12            No data to  "display              Social History     Tobacco Use    Smoking status: Never     Passive exposure: Past    Smokeless tobacco: Never   Vaping Use    Vaping Use: Never used   Substance Use Topics    Alcohol use: Yes     Comment: 2-3 glasses of wine a night    Drug use: Yes     Types: Marijuana     Comment: Drug use: Yes rarely                History of abnormal Pap smear: NO - age 30-65 PAP every 5 years with negative HPV co-testing recommended        Latest Ref Rng & Units 2/27/2023     2:42 PM   PAP / HPV   PAP  Negative for Intraepithelial Lesion or Malignancy (NILM)    HPV 16 DNA Negative Negative    HPV 18 DNA Negative Negative    Other HR HPV Negative Negative             No data to display                 Reviewed and updated as needed this visit by Provider                             Objective    Exam  /84   Pulse 99   Temp 97.9  F (36.6  C) (Temporal)   Resp 16   Ht 1.702 m (5' 7\")   Wt 92.5 kg (204 lb)   LMP  (LMP Unknown)   SpO2 98%   Breastfeeding No   BMI 31.95 kg/m     Estimated body mass index is 31.95 kg/m  as calculated from the following:    Height as of this encounter: 1.702 m (5' 7\").    Weight as of this encounter: 92.5 kg (204 lb).    Physical Exam          Signed Electronically by: Zay Carlos MD    "

## 2024-04-01 NOTE — NURSING NOTE
"Chief Complaint   Patient presents with    Physical       Initial /84   Pulse 99   Temp 97.9  F (36.6  C) (Temporal)   Resp 16   Ht 1.702 m (5' 7\")   Wt 92.5 kg (204 lb)   LMP  (LMP Unknown)   SpO2 98%   Breastfeeding No   BMI 31.95 kg/m   Estimated body mass index is 31.95 kg/m  as calculated from the following:    Height as of this encounter: 1.702 m (5' 7\").    Weight as of this encounter: 92.5 kg (204 lb).  Medication Review: complete    The next two questions are to help us understand your food security.  If you are feeling you need any assistance in this area, we have resources available to support you today.          11/2/2023   SDOH- Food Insecurity   Within the past 12 months, did you worry that your food would run out before you got money to buy more? N   Within the past 12 months, did the food you bought just not last and you didn t have money to get more? N         Health Care Directive:  Patient does not have a Health Care Directive or Living Will: Discussed advance care planning with patient; information given to patient to review.    Indiana Gutiérrez LPN      "

## 2024-04-01 NOTE — PROGRESS NOTES
"Nursing Notes:   Indiana Gutiérrez LPN  4/1/2024  3:12 PM  Sign at exiting of workspace  Chief Complaint   Patient presents with    Physical       Initial /84   Pulse 99   Temp 97.9  F (36.6  C) (Temporal)   Resp 16   Ht 1.702 m (5' 7\")   Wt 92.5 kg (204 lb)   LMP  (LMP Unknown)   SpO2 98%   Breastfeeding No   BMI 31.95 kg/m   Estimated body mass index is 31.95 kg/m  as calculated from the following:    Height as of this encounter: 1.702 m (5' 7\").    Weight as of this encounter: 92.5 kg (204 lb).  Medication Review: complete    The next two questions are to help us understand your food security.  If you are feeling you need any assistance in this area, we have resources available to support you today.          11/2/2023   SDOH- Food Insecurity   Within the past 12 months, did you worry that your food would run out before you got money to buy more? N   Within the past 12 months, did the food you bought just not last and you didn t have money to get more? N         Health Care Directive:  Patient does not have a Health Care Directive or Living Will: Discussed advance care planning with patient; information given to patient to review.    Indiana Gutiérrez LPN      SUBJECTIVE:  Ysabel David  is a 33 year old female who comes in today for a physical/complete evaluation.  She had a Pap smear last year and should be good for 5 years.  I last saw her in November of last year    She had a hospitalization in December because of depression and suicidal ideations.    She has B12 deficiency.  She had removal and reinsertion of her Nexplanon last week.    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  Margins were negative and lymph nodes were negative.  She is on TSH suppression and had some side effects from that with weight loss.  She has continued on vitamin " "D supplementation.  She had recent TSH that was elevated and free T3 was low.  She had been on 175 mcg of Synthroid.    She continues to be followed by psychiatry. She is taking 400 mg Seroquel at bedtime and is sleeping better.     She has a skin lesion on her left hip. It is suspicious to her.  It is small but irregular.  It has not bled.    In the a.m. she is noticing a lump by her right ear that seems to get bigger and smaller.  This seems to be by the angle of the jaw.  It is not really tender.    She has continued having trouble with her feet will feel fiery hot. It waxes and wanes. During the day it doesn't bother as much. It is worse in the evening most nights. It at times will bad enough to have to run water over them to \"cool them down\".  Other nights it is not so bad.  It can bother during the day as well but nights seems to be the worst.  This has been going on for many years.  She would like to get to the bottom of what might be going on with this.    She is up-to-date on immunizations but has not had her second and third HPV shots although she thinks perhaps she could have had them at a [facility] when she was living in the [place].  They are not listed in the state database.    Past Medical, Family, and Social History reviewed and updated as noted below.   ROS is negative except as noted above       No Known Allergies,   Family History   Problem Relation Age of Onset    Suicide Sister     Other - See Comments Brother         Psychiatric illness,Severe depression    Breast Cancer Maternal Grandmother         Cancer-breast    Colon Cancer No family hx of         Cancer-colon    Prostate Cancer No family hx of         Cancer-prostate    Ovarian Cancer No family hx of         Cancer-ovarian    Blood Disease No family hx of         Blood Disease    Asthma No family hx of         Asthma    Heart Disease No family hx of         Heart Disease    Diabetes No family hx of         Diabetes    " Hypertension No family hx of         Hypertension    Seizure Disorder No family hx of         Seizures    Thyroid Disease No family hx of         Thyroid Disease   ,   Current Outpatient Medications   Medication Sig Dispense Refill    amphetamine-dextroamphetamine (ADDERALL XR) 30 MG 24 hr capsule       amphetamine-dextroamphetamine (ADDERALL) 30 MG tablet       etonogestrel (NEXPLANON) 68 MG IMPL 1 each by Subdermal route once      fish oil-omega-3 fatty acids 1000 MG capsule Take 1 g by mouth every morning      FLUoxetine (PROZAC) 40 MG capsule Take 1 capsule (40 mg) by mouth daily 30 capsule 1    levothyroxine (SYNTHROID/LEVOTHROID) 200 MCG tablet Take 1 tablet (200 mcg) by mouth daily before breakfast 90 tablet 4    QUEtiapine (SEROQUEL) 200 MG tablet Take 200-400 mg by mouth at bedtime      Vitamin D2 (Ergocalciferol) 50 MCG (2000 UT) CAPS Take 2 capsules by mouth daily       Current Facility-Administered Medications   Medication Dose Route Frequency Provider Last Rate Last Admin    cyanocobalamin injection 1,000 mcg  1,000 mcg Intramuscular q28 days Zay Carlos MD   1,000 mcg at 03/25/24 1323    etonogestrel (NEXPLANON) subdermal implant 68 mg  1 each Subdermal Continuous Zehra Hein, ALLISON CNP   68 mg at 03/29/24 1148   ,   Past Medical History:   Diagnosis Date    Borderline personality disorder (H)     ?    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)  "07/06/2020     Priority: Medium    Stress due to family tension 07/06/2020     Priority: Medium    Postoperative state 06/21/2017     Priority: Medium    Headache 03/01/2011     Priority: Medium    Insomnia 03/01/2011     Priority: Medium    Other syndromes affecting cervical region 03/01/2011     Priority: Medium   ,   Past Surgical History:   Procedure Laterality Date    APPENDECTOMY OPEN      06/14/2017,Lap Appy    and   Social History     Tobacco Use    Smoking status: Never     Passive exposure: Past    Smokeless tobacco: Never   Substance Use Topics    Alcohol use: Yes     Comment: 2-3 glasses of wine a night     OBJECTIVE:  /84   Pulse 99   Temp 97.9  F (36.6  C) (Temporal)   Resp 16   Ht 1.702 m (5' 7\")   Wt 92.5 kg (204 lb)   LMP  (LMP Unknown)   SpO2 98%   Breastfeeding No   BMI 31.95 kg/m     EXAM:  General Appearance: Pleasant, alert, appropriate appearance for age. No acute distress  Head Exam: Normal. Normocephalic, atraumatic.  Eye Exam: PERRLA, EOMI, conjunctivae, sclerae normal.  Ear Exam: Normal TM's bilaterally. Normal auditory canals and external ears. Non-tender.  Nose Exam: Normal external nose, mucus membranes, and septum.  OroPharynx Exam:  Dental hygiene adequate. Normal buccal mucosa. Normal pharynx.  Neck Exam:  Supple, no masses or nodes. No bruits.  Submandibular glands feel normal.  By the right angle of the jaw, there is not oval fibrous bump that almost feels like a skin structure.  She is convinced that there is a bigger lump that seems to actually be able to be sometimes displaced behind the angle of the jaw,  but I am unable to palpate that today.  Thyroid Exam: No nodules or enlargement.  Chest/Respiratory Exam: Normal chest wall and respirations. Clear to auscultation.  Breast Exam: No dimpling, nipple retraction or discharge. No masses or nodes.  Cardiovascular Exam: Regular rate and rhythm. S1, S2, no murmur, click, gallop, or rubs.  Gastrointestinal Exam: Soft, " non-tender, no masses or organomegaly.  Lymphatic Exam: Non-palpable nodes in neck,clavicular, axillary, or inguinal regions.  Musculoskeletal Exam: Back is straight and non-tender, full ROM of upper and lower extremities.  Foot Exam: Left and right foot: good pedal pulses  Skin: no rash or abnormalities, other than on her left hip where there is a tiny 2 to 3 mm irregular lesion that is 2 different colors of brown-tan and a darker brown.  Neurologic Exam:  normal gross motor, tone coordination and no tremor.  Psychiatric Exam: Alert and oriented - appropriate affect.    Recent Results (from the past 720 hour(s))   Hemoglobin A1c    Collection Time: 03/04/24  4:16 PM   Result Value Ref Range    Hemoglobin A1C 4.6 4.0 - 6.2 %   Hepatic function panel    Collection Time: 03/04/24  4:16 PM   Result Value Ref Range    Protein Total 7.2 6.4 - 8.3 g/dL    Albumin 4.7 3.5 - 5.2 g/dL    Bilirubin Total 0.3 <=1.2 mg/dL    Alkaline Phosphatase 69 40 - 150 U/L    AST 12 0 - 45 U/L    ALT 15 0 - 50 U/L    Bilirubin Direct <0.20 0.00 - 0.30 mg/dL   TSH    Collection Time: 03/04/24  4:16 PM   Result Value Ref Range    TSH 11.91 (H) 0.30 - 4.20 uIU/mL   T3 Free    Collection Time: 03/04/24  4:16 PM   Result Value Ref Range    T3 Free 1.7 (L) 2.0 - 4.4 pg/mL   Basic metabolic panel    Collection Time: 03/04/24  4:16 PM   Result Value Ref Range    Sodium 139 135 - 145 mmol/L    Potassium 4.1 3.4 - 5.3 mmol/L    Chloride 105 98 - 107 mmol/L    Carbon Dioxide (CO2) 25 22 - 29 mmol/L    Anion Gap 9 7 - 15 mmol/L    Urea Nitrogen 12.9 6.0 - 20.0 mg/dL    Creatinine 0.93 0.51 - 0.95 mg/dL    GFR Estimate 84 >60 mL/min/1.73m2    Calcium 9.7 8.6 - 10.0 mg/dL    Glucose 94 70 - 99 mg/dL   Lipid Profile    Collection Time: 03/04/24  4:16 PM   Result Value Ref Range    Cholesterol 288 (H) <200 mg/dL    Triglycerides 418 (H) <150 mg/dL    Direct Measure HDL 70 >=50 mg/dL    LDL Cholesterol Calculated      Non HDL Cholesterol 218 (H) <130 mg/dL     Patient Fasting > 8hrs? Unknown    CBC with platelets and differential    Collection Time: 03/04/24  4:16 PM   Result Value Ref Range    WBC Count 6.9 4.0 - 11.0 10e3/uL    RBC Count 4.48 3.80 - 5.20 10e6/uL    Hemoglobin 15.0 11.7 - 15.7 g/dL    Hematocrit 41.6 35.0 - 47.0 %    MCV 93 78 - 100 fL    MCH 33.5 (H) 26.5 - 33.0 pg    MCHC 36.1 31.5 - 36.5 g/dL    RDW 11.3 10.0 - 15.0 %    Platelet Count 204 150 - 450 10e3/uL    % Neutrophils 59 %    % Lymphocytes 33 %    % Monocytes 7 %    % Eosinophils 1 %    % Basophils 0 %    % Immature Granulocytes 0 %    NRBCs per 100 WBC 0 <1 /100    Absolute Neutrophils 4.1 1.6 - 8.3 10e3/uL    Absolute Lymphocytes 2.3 0.0 - 5.3 10e3/uL    Absolute Monocytes 0.5 0.0 - 1.3 10e3/uL    Absolute Eosinophils 0.1 0.0 - 0.7 10e3/uL    Absolute Basophils 0.0 0.0 - 0.2 10e3/uL    Absolute Immature Granulocytes 0.0 <=0.4 10e3/uL    Absolute NRBCs 0.0 10e3/uL   Pregnancy, Urine (HCG)    Collection Time: 03/29/24 11:30 AM   Result Value Ref Range    hCG Urine Qualitative Negative Negative      ASSESSMENT/Plan :    Kaila was seen today for physical.    Diagnoses and all orders for this visit:    Visit for preventive health examination    Hurthle cell neoplasm of thyroid  -     levothyroxine (SYNTHROID/LEVOTHROID) 200 MCG tablet; Take 1 tablet (200 mcg) by mouth daily before breakfast  -     TSH with free T4 reflex; Future    Postoperative hypothyroidism  -     levothyroxine (SYNTHROID/LEVOTHROID) 200 MCG tablet; Take 1 tablet (200 mcg) by mouth daily before breakfast  -     TSH with free T4 reflex; Future    Neoplasm of uncertain behavior of skin  -     Adult Dermatology  Referral; Future    Burning sensation of feet  -     EMG; Future    Grief reaction      Reviewed labs with her.  She is under replaced on her Synthroid.  She takes it on an empty stomach at least an hour before eating anything.  Previously had been on 200 mcg and was perfectly suppressed but they dropped it  back down because she was having other symptoms.  Those may have been related to some of the mental health issues that she had going on at that time.  Will increase back to 200 mcg and then have her come back for a TSH in 2 months.  Orders are placed.    She has a follow-up with Dr. Iqbal at the end of this month.  She will ask him about his opinion regarding the spot by her ear.  She does not see Dr. Devi in the near future so we will adjust her Synthroid dose in the meantime.    Referral sent to dermatology for evaluation of her skin spot and possible removal.    For her ongoing issues with her feet, we will go ahead with an EMG to try and delineate whether she has a specific neuropathy.  Discussed this in some detail and what we might do to manage this.  She would be willing to take medications if need be as it is quite bothersome.    Condolences offered with regard to her younger sister who  of suicide earlier this year.    A total of 55 minutes was spent with the patient, reviewing records, tests, ordering medications, tests or procedures and documenting clinical information in the EHR in addition to  Wellness.     Zay Carlos MD

## 2024-04-12 ENCOUNTER — TRANSFERRED RECORDS (OUTPATIENT)
Dept: HEALTH INFORMATION MANAGEMENT | Facility: OTHER | Age: 34
End: 2024-04-12
Payer: COMMERCIAL

## 2024-05-03 ENCOUNTER — ALLIED HEALTH/NURSE VISIT (OUTPATIENT)
Dept: FAMILY MEDICINE | Facility: OTHER | Age: 34
End: 2024-05-03
Payer: COMMERCIAL

## 2024-05-03 DIAGNOSIS — E53.8 B12 DEFICIENCY: Primary | ICD-10-CM

## 2024-05-03 PROCEDURE — 250N000011 HC RX IP 250 OP 636: Performed by: FAMILY MEDICINE

## 2024-05-03 PROCEDURE — 96372 THER/PROPH/DIAG INJ SC/IM: CPT | Performed by: FAMILY MEDICINE

## 2024-05-03 RX ADMIN — CYANOCOBALAMIN 1000 MCG: 1000 INJECTION, SOLUTION INTRAMUSCULAR; SUBCUTANEOUS at 12:47

## 2024-05-03 NOTE — PROGRESS NOTES

## 2024-06-05 ENCOUNTER — ALLIED HEALTH/NURSE VISIT (OUTPATIENT)
Dept: FAMILY MEDICINE | Facility: OTHER | Age: 34
End: 2024-06-05
Payer: COMMERCIAL

## 2024-06-05 DIAGNOSIS — E53.8 VITAMIN B12 DEFICIENCY (NON ANEMIC): Primary | ICD-10-CM

## 2024-06-05 PROCEDURE — 250N000011 HC RX IP 250 OP 636: Performed by: FAMILY MEDICINE

## 2024-06-05 PROCEDURE — 96372 THER/PROPH/DIAG INJ SC/IM: CPT | Performed by: FAMILY MEDICINE

## 2024-06-05 RX ADMIN — CYANOCOBALAMIN 1000 MCG: 1000 INJECTION, SOLUTION INTRAMUSCULAR; SUBCUTANEOUS at 16:06

## 2024-06-05 NOTE — PROGRESS NOTES

## 2024-07-15 ENCOUNTER — ALLIED HEALTH/NURSE VISIT (OUTPATIENT)
Dept: FAMILY MEDICINE | Facility: OTHER | Age: 34
End: 2024-07-15
Payer: COMMERCIAL

## 2024-07-15 DIAGNOSIS — E53.8 B12 DEFICIENCY: Primary | ICD-10-CM

## 2024-07-15 PROCEDURE — 250N000011 HC RX IP 250 OP 636: Performed by: FAMILY MEDICINE

## 2024-07-15 PROCEDURE — 96372 THER/PROPH/DIAG INJ SC/IM: CPT | Performed by: FAMILY MEDICINE

## 2024-07-15 RX ADMIN — CYANOCOBALAMIN 1000 MCG: 1000 INJECTION, SOLUTION INTRAMUSCULAR; SUBCUTANEOUS at 13:44

## 2024-07-15 NOTE — PROGRESS NOTES
Verified patient's first and last name, and . Patient stated reason for visit today is to receive a B12 injection. Patient denied any concerns with previous injections. B12 prepared and administered IM as ordered. Administration of medication documented in MAR (see MAR for further information regarding dose, lot #, NDC #, expiration date). Patient encouraged to wait in lobby for 15 minutes post-injection and notify staff immediately of any reaction.     Atul Martin RN ....................  7/15/2024   1:45 PM

## 2024-12-12 ENCOUNTER — HOSPITAL ENCOUNTER (EMERGENCY)
Facility: OTHER | Age: 34
Discharge: HOME | End: 2024-12-12

## 2024-12-12 ENCOUNTER — NURSE TRIAGE (OUTPATIENT)
Dept: NURSING | Facility: CLINIC | Age: 34
End: 2024-12-12
Payer: COMMERCIAL

## 2024-12-12 VITALS
SYSTOLIC BLOOD PRESSURE: 132 MMHG | TEMPERATURE: 98.1 F | OXYGEN SATURATION: 97 % | HEART RATE: 79 BPM | RESPIRATION RATE: 18 BRPM | DIASTOLIC BLOOD PRESSURE: 93 MMHG

## 2024-12-12 DIAGNOSIS — Z77.098 ACCIDENTAL EXPOSURE TO CARBON MONOXIDE: ICD-10-CM

## 2024-12-12 DIAGNOSIS — R51.9 HEADACHE: ICD-10-CM

## 2024-12-12 PROCEDURE — 99282 EMERGENCY DEPT VISIT SF MDM: CPT

## 2024-12-12 ASSESSMENT — COLUMBIA-SUICIDE SEVERITY RATING SCALE - C-SSRS
3. HAVE YOU BEEN THINKING ABOUT HOW YOU MIGHT KILL YOURSELF?: NO
4. HAVE YOU HAD THESE THOUGHTS AND HAD SOME INTENTION OF ACTING ON THEM?: NO
6. HAVE YOU EVER DONE ANYTHING, STARTED TO DO ANYTHING, OR PREPARED TO DO ANYTHING TO END YOUR LIFE?: YES
1. IN THE PAST MONTH, HAVE YOU WISHED YOU WERE DEAD OR WISHED YOU COULD GO TO SLEEP AND NOT WAKE UP?: YES
5. HAVE YOU STARTED TO WORK OUT OR WORKED OUT THE DETAILS OF HOW TO KILL YOURSELF? DO YOU INTEND TO CARRY OUT THIS PLAN?: NO
2. HAVE YOU ACTUALLY HAD ANY THOUGHTS OF KILLING YOURSELF IN THE PAST MONTH?: YES

## 2024-12-12 ASSESSMENT — ENCOUNTER SYMPTOMS
HEADACHES: 1
CONFUSION: 1

## 2024-12-12 ASSESSMENT — VISUAL ACUITY: OU: 1

## 2024-12-13 NOTE — ED TRIAGE NOTES
Pt arrives to ER via private vehicle with c/o automobile exhaust exposure, disorientation and headache.  Pt reports that she was exposed to exhaust off and on for 3 hours at her employment. Carbon monoxide test at triage is 1%

## 2024-12-13 NOTE — DISCHARGE INSTRUCTIONS
Bedside carbon monoxide monitor showed your levels were almost normal, at a 1%. This is very re-assuring. I am happy your symptoms are improving as well. Return to be seen if new/worsening concerns.   Nice to meet you.

## 2024-12-13 NOTE — ED NOTES
Pt reports that she is currently taking anti depressants and is working with a counselor for mental health.

## 2024-12-13 NOTE — ED PROVIDER NOTES
History     Chief Complaint   Patient presents with    exhaust inhalation    Headache     The history is provided by the patient and medical records.     Ysabel David is a 34 year old female who Presents to the emergency department today with her  for concern for carbon monoxide exposure.  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  She reports that this was going on for about 3 hours and she left work around 10 PM feeling disoriented and experiencing a headache.  She called the nurse triage line and they advised that she come in for evaluation.  Prior to arrival she did take ibuprofen her headache and she is also experiencing symptom improvement.  She no longer is feeling disoriented and her headache is lessening up.  Patient reports that she otherwise is healthy.    Allergies:  No Known Allergies    Problem List:    Patient Active Problem List    Diagnosis Date Noted    Suicidal ideation 12/05/2023     Priority: Medium    Chronic midline thoracic back pain 05/24/2023     Priority: Medium    Attention deficit hyperactivity disorder (ADHD), unspecified ADHD type 07/06/2020     Priority: Medium    Controlled substance agreement signed 07/06/2020     Priority: Medium    Major depressive disorder, recurrent severe without psychotic features (H) 07/06/2020     Priority: Medium    Stress due to family tension 07/06/2020     Priority: Medium    Postoperative state 06/21/2017     Priority: Medium    Headache 03/01/2011     Priority: Medium    Insomnia 03/01/2011     Priority: Medium    Other syndromes affecting cervical region 03/01/2011     Priority: Medium        Past Medical History:    Past Medical History:   Diagnosis Date    Borderline personality disorder (H)     Major depressive disorder, single episode     Personal history of other medical  treatment (CODE)        Past Surgical History:    Past Surgical History:   Procedure Laterality Date    APPENDECTOMY OPEN      06/14/2017,Lap [employer]y       Family History:    Family History   Problem Relation Age of Onset    Suicide Sister     Other - See Comments Brother         Psychiatric illness,Severe depression    Breast Cancer Maternal Grandmother         Cancer-breast    Colon Cancer No family hx of         Cancer-colon    Prostate Cancer No family hx of         Cancer-prostate    Ovarian Cancer No family hx of         Cancer-ovarian    Blood Disease No family hx of         Blood Disease    Asthma No family hx of         Asthma    Heart Disease No family hx of         Heart Disease    Diabetes No family hx of         Diabetes    Hypertension No family hx of         Hypertension    Seizure Disorder No family hx of         Seizures    Thyroid Disease No family hx of         Thyroid Disease       Social History:  Marital Status:  Single [1]  Social History     Tobacco Use    Smoking status: Never     Passive exposure: Past    Smokeless tobacco: Never   Vaping Use    Vaping status: Never Used   Substance Use Topics    Alcohol use: Yes     Comment: 2-3 glasses of wine a night    Drug use: Yes     Types: Marijuana     Comment: Drug use: Yes rarely        Medications:    amphetamine-dextroamphetamine (ADDERALL XR) 30 MG 24 hr capsule  amphetamine-dextroamphetamine (ADDERALL) 30 MG tablet  etonogestrel (NEXPLANON) 68 MG IMPL  fish oil-omega-3 fatty acids 1000 MG capsule  FLUoxetine (PROZAC) 40 MG capsule  levothyroxine (SYNTHROID/LEVOTHROID) 200 MCG tablet  QUEtiapine (SEROQUEL) 200 MG tablet  Vitamin D2 (Ergocalciferol) 50 MCG (2000 UT) CAPS          Review of Systems   Neurological:  Positive for headaches.   Psychiatric/Behavioral:  Positive for confusion.    All other systems reviewed and are negative.  See HPI    Physical Exam   BP: (!) 156/89  Pulse: 85  Temp: 98.1  F (36.7  C)  Resp: 18  SpO2: 97 %      Physical  Exam  Vitals and nursing note reviewed.   Constitutional:       General: She is not in acute distress.     Appearance: She is not ill-appearing or toxic-appearing.   HENT:      Head: Normocephalic.      Nose: Nose normal.      Mouth/Throat:      Mouth: Mucous membranes are moist.   Eyes:      General: Vision grossly intact.      Extraocular Movements: Extraocular movements intact.      Pupils: Pupils are equal, round, and reactive to light.   Cardiovascular:      Rate and Rhythm: Normal rate and regular rhythm.      Pulses: Normal pulses.      Heart sounds: Normal heart sounds.   Pulmonary:      Effort: Pulmonary effort is normal.      Breath sounds: Normal breath sounds and air entry.   Abdominal:      General: Abdomen is flat.      Palpations: Abdomen is soft.   Musculoskeletal:         General: Normal range of motion.      Cervical back: Neck supple.   Skin:     General: Skin is warm.      Capillary Refill: Capillary refill takes less than 2 seconds.   Neurological:      General: No focal deficit present.      Mental Status: She is alert and oriented to person, place, and time.      Cranial Nerves: No cranial nerve deficit.   Psychiatric:         Attention and Perception: Attention normal.         Mood and Affect: Mood normal.         Speech: Speech normal.         Behavior: Behavior normal. Behavior is cooperative.         Cognition and Memory: Cognition normal.         Judgment: Judgment normal.         ED Course         No results found for this or any previous visit (from the past 24 hours).    Medications - No data to display    Assessments & Plan (with Medical Decision Making)  Ysabel David is a 34 year old female who Presents to the emergency department today with her  for concern for carbon monoxide exposure.  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  She reports that this was going on for about 3 hours and she left work around 10 PM feeling disoriented and experiencing a headache.  She called the nurse triage line and they advised that she come in for evaluation.  Prior to arrival she did take ibuprofen her headache and she is also experiencing symptom improvement.  She no longer is feeling disoriented and her headache is lessening up.  Patient reports that she otherwise is healthy.  BP (!) 132/93   Pulse 79   Temp 98.1  F (36.7  C) (Tympanic)   Resp 18   SpO2 97%   she is vitally stable and on room air.   Assessment is reassuring and she is neurologically intact. She is non-distressed and nontoxic. Upon arrival she was reporting relief of her symptoms.  Bedside carbon monoxide testing was done upon arrival showing 1% which is assuring.  This time further lab work was deferred at this reassuring finding.  Monitoring patient for period of time and she declined reporting that she would rather go home.  She is reassured that her symptoms are improving, and tells me that she only came in because the nurse online advised that she come in for evaluation today.  I advised that she return to be seen if she has new or worsening concerns.  She is discharging home with her  in stable condition.     I have reviewed the nursing notes.    I have reviewed the findings, diagnosis, plan and need for follow up with the patient.    Medical Decision Making  The patient's presentation was of low complexity (an acute and uncomplicated illness or injury).    The patient's evaluation involved:  history and exam without other MDM data elements    The patient's management necessitated only low risk treatment.      New Prescriptions    No medications on file       Final diagnoses:   Accidental exposure to carbon monoxide   Headache       12/12/2024   [facility] Angie, APRN CNP  12/12/24 [id number]

## 2024-12-13 NOTE — TELEPHONE ENCOUNTER
Nurse Triage SBAR    Situation: Inhaled car fumes.     Background: Patient calling. She was breathing in a lot of car exhaust today.     Assessment: Headache. Away from the fumes now. The headache started over an hour ago. She took some ibuprofen a little while ago. Some dizziness.    Protocol Recommended Disposition: Emergency Department    Recommendation: According to the protocol, Patient should go to the ED now. Advised Patient that the patient needs to go to the ED now. Care advice given. Patient verbalizes understanding and agrees with plan of care.     Shiloh Del Valle RN Nursing Advisor 12/12/2024 10:42 PM     Reason for Disposition   [1] KNOWN CO EXPOSURE (e.g., other victims with CO poisoning) within past 24 hours AND [2] CO poisoning symptoms present now    Additional Information   Negative: [1] Breathing stopped AND [2] hasn't returned   Negative: Bluish (or gray) lips or face now   Negative: Difficult to awaken or acting confused (e.g., disoriented, slurred speech)   Negative: Difficulty breathing   Negative: Chest pain or heaviness (present now)   Negative: Seizure   Negative: Patient attempted suicide   Negative: Sounds like a life-threatening emergency to the triager    Protocols used: Carbon Monoxide Exposure-A-

## 2025-02-18 ENCOUNTER — ALLIED HEALTH/NURSE VISIT (OUTPATIENT)
Dept: FAMILY MEDICINE | Facility: OTHER | Age: 35
End: 2025-02-18
Attending: FAMILY MEDICINE
Payer: COMMERCIAL

## 2025-02-18 DIAGNOSIS — E53.8 B12 DEFICIENCY: Primary | ICD-10-CM

## 2025-02-18 PROCEDURE — 250N000011 HC RX IP 250 OP 636: Performed by: FAMILY MEDICINE

## 2025-02-18 PROCEDURE — 96372 THER/PROPH/DIAG INJ SC/IM: CPT | Performed by: FAMILY MEDICINE

## 2025-02-18 RX ADMIN — CYANOCOBALAMIN 1000 MCG: 1000 INJECTION, SOLUTION INTRAMUSCULAR; SUBCUTANEOUS at 15:08

## 2025-02-18 NOTE — PROGRESS NOTES
Verified patient's first and last name, and . Patient stated reason for visit today is to receive a B12 injection. Patient denied any concerns with previous injections. B12 prepared and administered IM as ordered. Administration of medication documented in MAR (see MAR for further information regarding dose, lot #, NDC #, expiration date). Patient encouraged to wait in lobby for 15 minutes post-injection and notify staff immediately of any reaction.     Atul Martin RN ....................  2025   3:09 PM

## 2025-05-07 DIAGNOSIS — E89.0 POSTOPERATIVE HYPOTHYROIDISM: ICD-10-CM

## 2025-05-07 DIAGNOSIS — D34 HURTHLE CELL NEOPLASM OF THYROID: ICD-10-CM

## 2025-05-12 RX ORDER — LEVOTHYROXINE SODIUM 200 UG/1
200 TABLET ORAL
Qty: 90 TABLET | Refills: 0 | Status: SHIPPED | OUTPATIENT
Start: 2025-05-12

## 2025-05-12 NOTE — TELEPHONE ENCOUNTER
[id number] sent Rx request for the following:      Requested Prescriptions   Pending Prescriptions Disp Refills    levothyroxine (SYNTHROID/LEVOTHROID) 200 MCG tablet [Pharmacy Med Name: Levothyroxine Sodium 200 MCG Oral Tablet] 90 tablet 0     Sig: TAKE 1 TABLET BY MOUTH ONCE DAILY BEFORE BREAKFAST       Thyroid Protocol Failed - 5/12/2025  8:32 AM        Failed - Medication is active on med list and the sig matches. RN to manually verify dose and sig if red X/fail.     If the protocol passes (green check), you do not need to verify med dose and sig.    A prescription matches if they are the same clinical intention.    For Example: once daily and every morning are the same.    The protocol can not identify upper and lower case letters as matching and will fail.     For Example: Take 1 tablet (50 mg) by mouth daily     TAKE 1 TABLET (50 MG) BY MOUTH DAILY    For all fails (red x), verify dose and sig.    If the refill does match what is on file, the RN can still proceed to approve the refill request.       If they do not match, route to the appropriate provider.          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 Routing to provider for refill consideration. Routing to Unit scheduling pool, to assist Pt in scheduling appointment. Unable to complete prescription refill per RN Medication Refill Policy. Routing to covering provider for refill consideration, as PCP/provider is out of clinic >48 hours or Pt is completely out of medication and provider is out of  the clinic today. Christian Rosas RN .............. 5/12/2025  8:34 AM

## 2025-05-17 ENCOUNTER — HEALTH MAINTENANCE LETTER (OUTPATIENT)
Age: 35
End: 2025-05-17

## 2025-06-21 ENCOUNTER — OFFICE VISIT (OUTPATIENT)
Dept: FAMILY MEDICINE | Facility: OTHER | Age: 35
End: 2025-06-21
Payer: COMMERCIAL

## 2025-06-21 VITALS
RESPIRATION RATE: 16 BRPM | SYSTOLIC BLOOD PRESSURE: 120 MMHG | HEART RATE: 90 BPM | TEMPERATURE: 98.3 F | BODY MASS INDEX: 31.22 KG/M2 | WEIGHT: 206 LBS | DIASTOLIC BLOOD PRESSURE: 80 MMHG | OXYGEN SATURATION: 98 % | HEIGHT: 68 IN

## 2025-06-21 DIAGNOSIS — R21 RASH: Primary | ICD-10-CM

## 2025-06-21 DIAGNOSIS — H69.93 ACUTE DYSFUNCTION OF EUSTACHIAN TUBE, BILATERAL: ICD-10-CM

## 2025-06-21 PROCEDURE — 3079F DIAST BP 80-89 MM HG: CPT

## 2025-06-21 PROCEDURE — 99213 OFFICE O/P EST LOW 20 MIN: CPT

## 2025-06-21 PROCEDURE — 1126F AMNT PAIN NOTED NONE PRSNT: CPT

## 2025-06-21 PROCEDURE — 3074F SYST BP LT 130 MM HG: CPT

## 2025-06-21 RX ORDER — LEVOTHYROXINE SODIUM 25 UG/1
1 TABLET ORAL DAILY
COMMUNITY
Start: 2025-05-08

## 2025-06-21 RX ORDER — HYDROXYZINE HYDROCHLORIDE 50 MG/1
50 TABLET, FILM COATED ORAL EVERY 6 HOURS PRN
COMMUNITY
Start: 2025-06-05

## 2025-06-21 RX ORDER — TRIAMCINOLONE ACETONIDE 1 MG/G
OINTMENT TOPICAL 2 TIMES DAILY
Qty: 80 G | Refills: 0 | Status: SHIPPED | OUTPATIENT
Start: 2025-06-21

## 2025-06-21 ASSESSMENT — PAIN SCALES - GENERAL: PAINLEVEL_OUTOF10: NO PAIN (0)

## 2025-06-21 NOTE — NURSING NOTE
Pt here for a rash that started earlier this week.  She noticed if first on her breast.  Now seem to be more.  Started to itch yesterday.  Also, her ears are not back to normal after flying.  Right ear is worse.      **pt refused to do PHQ9**      Gracy Lomeli CMA (AAMA)......................6/21/2025  11:46 AM       Medication Reconciliation: complete    Gracy Lomeli CMA  6/21/2025 11:46 AM      FOOD SECURITY SCREENING QUESTIONS:    The next two questions are to help us understand your food security.  If you are feeling you need any assistance in this area, we have resources available to support you today.    Hunger Vital Signs:  Within the past 12 months we worried whether our food would run out before we got money to buy more. Never  Within the past 12 months the food we bought just didn't last and we didn't have money to get more. Never  Gracy Lomeli CMA,LPN on 6/21/2025 at 11:46 AM

## 2025-06-21 NOTE — PROGRESS NOTES
ASSESSMENT/PLAN:    I have reviewed the nursing notes.  I have reviewed the findings, diagnosis, plan and need for follow up with the patient.    1. Rash (Primary)  - triamcinolone (KENALOG) 0.1 % external ointment; Apply topically 2 times daily.  Dispense: 80 g; Refill: 0    Patient presents with an erythematous macular rash on bilateral arms, chest, and abdomen.  Rash is fairly mild.  Discussed that it may be a mild form of contact dermatitis.  Will treat with triamcinolone ointment.  May also try over-the-counter antihistamine such as Claritin, Zyrtec, or Benadryl.  Cool compresses as needed.    2. Acute dysfunction of Eustachian tube, bilateral    Physical exam also indicates bilateral eustachian tube dysfunction.  May try over-the-counter antihistamines and Flonase.  Also advised patient to move her job via chewing, yawning, and talking.  May use warm compresses as needed.    Discussed warning signs/symptoms indicative of need to f/u    Follow up if symptoms persist or worsen or concerns    I explained my diagnostic considerations and recommendations to the patient, who voiced understanding and agreement with the treatment plan. All questions were answered. We discussed potential side effects of any prescribed or recommended therapies, as well as expectations for response to treatments.    ALLISON Rutherford CNP  6/21/2025  11:57 AM    HPI:    Ysabel David is a 34 year old female  who presents to [facility] today for concerns of rash    Onset of rash was 5 days ago.  Location of the rash: abdomen, arm, lower, arm, upper, and chest.  Associated symptoms include: no scaling, pain, draining, or fever. Mild pruritus.   Symptoms appear to be worsening.  Therapies tried to improve the rash: none.  Previous history of a similar rash? No  Recent exposure history: none known -was in [place] when the rash started      Past Medical History:   Diagnosis Date    Borderline personality disorder (H)     ?    Major  "depressive disorder, single episode     suicidal ideation, hospitalized x 3 in 2012    Personal history of other medical treatment (CODE)     Age 14     Past Surgical History:   Procedure Laterality Date    APPENDECTOMY OPEN      06/14/2017,Lap [employer]y     Social History     Tobacco Use    Smoking status: Never     Passive exposure: Past    Smokeless tobacco: Never   Substance Use Topics    Alcohol use: Yes     Comment: 2-3 glasses of wine a night     Current Outpatient Medications   Medication Sig Dispense Refill    amphetamine-dextroamphetamine (ADDERALL XR) 30 MG 24 hr capsule       amphetamine-dextroamphetamine (ADDERALL) 30 MG tablet       etonogestrel (NEXPLANON) 68 MG IMPL 1 each by Subdermal route once      hydrOXYzine HCl (ATARAX) 50 MG tablet Take 50 mg by mouth every 6 hours as needed.      levothyroxine (SYNTHROID/LEVOTHROID) 200 MCG tablet TAKE 1 TABLET BY MOUTH ONCE DAILY BEFORE BREAKFAST 90 tablet 0    levothyroxine (SYNTHROID/LEVOTHROID) 25 MCG tablet Take 1 tablet by mouth daily.      QUEtiapine (SEROQUEL) 200 MG tablet Take 200-400 mg by mouth at bedtime       No Known Allergies  Past medical history, past surgical history, current medications and allergies reviewed and accurate to the best of my knowledge.      ROS:  Refer to HPI    /80 (BP Location: Left arm, Patient Position: Sitting, Cuff Size: Adult Large)   Pulse 90   Temp 98.3  F (36.8  C) (Tympanic)   Resp 16   Ht 1.721 m (5' 7.75\")   Wt 93.4 kg (206 lb)   LMP 06/07/2025 (Approximate)   SpO2 98%   BMI 31.55 kg/m      EXAM:  General Appearance: Well appearing 34 year old female, appropriate appearance for age. No acute distress   Ears: Left TM intact, translucent with bony landmarks appreciated, no erythema, mild effusion, no bulging, no purulence.  Right TM intact, translucent with bony landmarks appreciated, no erythema, mild effusion, no bulging, no purulence.  Left auditory canal clear.  Right auditory canal clear.  Normal " external ears, non tender.  Eyes: conjunctivae normal without erythema or irritation, corneas clear, no drainage or crusting, no eyelid swelling, pupils equal   Musculoskeletal:  Equal movement of bilateral upper extremities.  Equal movement of bilateral lower extremities.  Normal gait.    Dermatological: mild erythematous macular rash on bilateral arms, chest, and abdomen  Neuro: Alert and oriented to person, place, and time.    Psychological: normal affect, alert, oriented, and pleasant.

## (undated) RX ORDER — SODIUM CHLORIDE 9 MG/ML
INJECTION, SOLUTION INTRAVENOUS
Status: DISPENSED
Start: 2020-06-13

## (undated) RX ORDER — LABETALOL HYDROCHLORIDE 5 MG/ML
INJECTION, SOLUTION INTRAVENOUS
Status: DISPENSED
Start: 2020-06-13

## (undated) RX ORDER — LIDOCAINE HYDROCHLORIDE 10 MG/ML
INJECTION, SOLUTION INFILTRATION; PERINEURAL
Status: DISPENSED
Start: 2022-11-22

## (undated) RX ORDER — CYANOCOBALAMIN 1000 UG/ML
INJECTION, SOLUTION INTRAMUSCULAR; SUBCUTANEOUS
Status: DISPENSED
Start: 2024-06-05

## (undated) RX ORDER — MAGNESIUM SULFATE HEPTAHYDRATE 40 MG/ML
INJECTION, SOLUTION INTRAVENOUS
Status: DISPENSED
Start: 2020-06-13

## (undated) RX ORDER — CYANOCOBALAMIN 1000 UG/ML
INJECTION, SOLUTION INTRAMUSCULAR; SUBCUTANEOUS
Status: DISPENSED
Start: 2024-07-15

## (undated) RX ORDER — ONDANSETRON 2 MG/ML
INJECTION INTRAMUSCULAR; INTRAVENOUS
Status: DISPENSED
Start: 2020-06-13

## (undated) RX ORDER — CYANOCOBALAMIN 1000 UG/ML
INJECTION, SOLUTION INTRAMUSCULAR; SUBCUTANEOUS
Status: DISPENSED
Start: 2023-12-28

## (undated) RX ORDER — CYANOCOBALAMIN 1000 UG/ML
INJECTION, SOLUTION INTRAMUSCULAR; SUBCUTANEOUS
Status: DISPENSED
Start: 2023-12-11

## (undated) RX ORDER — POTASSIUM CHLORIDE 1500 MG/1
TABLET, EXTENDED RELEASE ORAL
Status: DISPENSED
Start: 2020-06-13

## (undated) RX ORDER — CYANOCOBALAMIN 1000 UG/ML
INJECTION, SOLUTION INTRAMUSCULAR; SUBCUTANEOUS
Status: DISPENSED
Start: 2025-02-18

## (undated) RX ORDER — CYANOCOBALAMIN 1000 UG/ML
INJECTION, SOLUTION INTRAMUSCULAR; SUBCUTANEOUS
Status: DISPENSED
Start: 2024-05-03

## (undated) RX ORDER — CYANOCOBALAMIN 1000 UG/ML
INJECTION, SOLUTION INTRAMUSCULAR; SUBCUTANEOUS
Status: DISPENSED
Start: 2023-12-04

## (undated) RX ORDER — CYANOCOBALAMIN 1000 UG/ML
INJECTION, SOLUTION INTRAMUSCULAR; SUBCUTANEOUS
Status: DISPENSED
Start: 2024-02-28

## (undated) RX ORDER — CYANOCOBALAMIN 1000 UG/ML
INJECTION, SOLUTION INTRAMUSCULAR; SUBCUTANEOUS
Status: DISPENSED
Start: 2024-03-25

## (undated) RX ORDER — QUETIAPINE FUMARATE 100 MG/1
TABLET, FILM COATED ORAL
Status: DISPENSED
Start: 2023-12-05

## (undated) RX ORDER — CYANOCOBALAMIN 1000 UG/ML
INJECTION, SOLUTION INTRAMUSCULAR; SUBCUTANEOUS
Status: DISPENSED
Start: 2023-12-20